# Patient Record
Sex: MALE | Race: WHITE | NOT HISPANIC OR LATINO | Employment: FULL TIME | URBAN - NONMETROPOLITAN AREA
[De-identification: names, ages, dates, MRNs, and addresses within clinical notes are randomized per-mention and may not be internally consistent; named-entity substitution may affect disease eponyms.]

---

## 2017-01-01 ENCOUNTER — HOSPITAL ENCOUNTER (OUTPATIENT)
Dept: OTHER | Facility: HOSPITAL | Age: 62
Setting detail: RADIATION/ONCOLOGY SERIES
Discharge: HOME | End: 2017-01-20
Attending: INTERNAL MEDICINE | Admitting: INTERNAL MEDICINE

## 2017-01-16 DIAGNOSIS — D50.9 IRON DEFICIENCY ANEMIA, UNSPECIFIED: Primary | ICD-10-CM

## 2017-02-03 DIAGNOSIS — I73.9 PVD (PERIPHERAL VASCULAR DISEASE) (HCC): Primary | ICD-10-CM

## 2017-02-03 RX ORDER — CLOPIDOGREL BISULFATE 75 MG/1
75 TABLET ORAL DAILY
Qty: 30 TABLET | Refills: 5 | Status: SHIPPED | OUTPATIENT
Start: 2017-02-03 | End: 2017-09-08 | Stop reason: SDUPTHER

## 2017-02-03 RX ORDER — CLOPIDOGREL BISULFATE 75 MG/1
TABLET ORAL
Qty: 30 TABLET | Refills: 0 | Status: CANCELLED | OUTPATIENT
Start: 2017-02-03

## 2017-02-20 ENCOUNTER — LAB (OUTPATIENT)
Dept: ONCOLOGY | Facility: HOSPITAL | Age: 62
End: 2017-02-20

## 2017-02-20 DIAGNOSIS — D50.9 IRON DEFICIENCY ANEMIA, UNSPECIFIED: ICD-10-CM

## 2017-02-20 LAB
BASOPHILS # BLD AUTO: 0.05 10*3/MM3 (ref 0–0.2)
BASOPHILS NFR BLD AUTO: 0.3 % (ref 0–2)
DEPRECATED RDW RBC AUTO: 54.9 FL (ref 35.1–43.9)
EOSINOPHIL # BLD AUTO: 0.66 10*3/MM3 (ref 0–0.7)
EOSINOPHIL NFR BLD AUTO: 4.2 % (ref 0–7)
ERYTHROCYTE [DISTWIDTH] IN BLOOD BY AUTOMATED COUNT: 17.9 % (ref 11.5–14.5)
FERRITIN SERPL-MCNC: 222 NG/ML (ref 17.9–464)
HCT VFR BLD AUTO: 39.2 % (ref 39–49)
HGB BLD-MCNC: 13.5 G/DL (ref 13.7–17.3)
IMM GRANULOCYTES # BLD: 0.08 10*3/MM3 (ref 0–0.02)
IMM GRANULOCYTES NFR BLD: 0.5 % (ref 0–0.5)
IRON 24H UR-MRATE: 40 MCG/DL (ref 49–181)
IRON SATN MFR SERPL: 14 % (ref 20–55)
LYMPHOCYTES # BLD AUTO: 2.3 10*3/MM3 (ref 0.6–4.2)
LYMPHOCYTES NFR BLD AUTO: 14.6 % (ref 10–50)
MCH RBC QN AUTO: 28.8 PG (ref 26.5–34)
MCHC RBC AUTO-ENTMCNC: 34.4 G/DL (ref 31.5–36.3)
MCV RBC AUTO: 83.8 FL (ref 80–98)
MONOCYTES # BLD AUTO: 0.6 10*3/MM3 (ref 0–0.9)
MONOCYTES NFR BLD AUTO: 3.8 % (ref 0–12)
NEUTROPHILS # BLD AUTO: 12.04 10*3/MM3 (ref 2–8.6)
NEUTROPHILS NFR BLD AUTO: 76.6 % (ref 37–80)
PLATELET # BLD AUTO: 299 10*3/MM3 (ref 150–450)
PMV BLD AUTO: 11 FL (ref 8–12)
RBC # BLD AUTO: 4.68 10*6/MM3 (ref 4.37–5.74)
TIBC SERPL-MCNC: 286 MCG/DL (ref 261–462)
WBC NRBC COR # BLD: 15.73 10*3/MM3 (ref 3.2–9.8)

## 2017-02-20 PROCEDURE — 83540 ASSAY OF IRON: CPT | Performed by: INTERNAL MEDICINE

## 2017-02-20 PROCEDURE — 83550 IRON BINDING TEST: CPT | Performed by: INTERNAL MEDICINE

## 2017-02-20 PROCEDURE — 85025 COMPLETE CBC W/AUTO DIFF WBC: CPT | Performed by: INTERNAL MEDICINE

## 2017-02-20 PROCEDURE — 36415 COLL VENOUS BLD VENIPUNCTURE: CPT | Performed by: INTERNAL MEDICINE

## 2017-02-20 PROCEDURE — 82728 ASSAY OF FERRITIN: CPT | Performed by: INTERNAL MEDICINE

## 2017-02-22 ENCOUNTER — HOSPITAL ENCOUNTER (OUTPATIENT)
Dept: CT IMAGING | Facility: HOSPITAL | Age: 62
Discharge: HOME | End: 2017-02-22
Admitting: THORACIC SURGERY (CARDIOTHORACIC VASCULAR SURGERY)

## 2017-02-22 DIAGNOSIS — C34.91 MALIGNANT NEOPLASM OF UNSPECIFIED PART OF RIGHT BRONCHUS OR LUNG (HCC): ICD-10-CM

## 2017-02-22 PROCEDURE — 0 IOPAMIDOL 61 % SOLUTION: Performed by: THORACIC SURGERY (CARDIOTHORACIC VASCULAR SURGERY)

## 2017-02-22 PROCEDURE — 71260 CT THORAX DX C+: CPT

## 2017-02-22 RX ADMIN — IOPAMIDOL 94 ML: 612 INJECTION, SOLUTION INTRAVENOUS at 18:53

## 2017-02-23 ENCOUNTER — OFFICE VISIT (OUTPATIENT)
Dept: CARDIAC SURGERY | Facility: CLINIC | Age: 62
End: 2017-02-23

## 2017-02-23 ENCOUNTER — OFFICE VISIT (OUTPATIENT)
Dept: ONCOLOGY | Facility: CLINIC | Age: 62
End: 2017-02-23

## 2017-02-23 VITALS
WEIGHT: 176.1 LBS | BODY MASS INDEX: 25.21 KG/M2 | HEART RATE: 83 BPM | HEIGHT: 70 IN | DIASTOLIC BLOOD PRESSURE: 59 MMHG | TEMPERATURE: 97.4 F | RESPIRATION RATE: 18 BRPM | SYSTOLIC BLOOD PRESSURE: 100 MMHG

## 2017-02-23 VITALS
WEIGHT: 176.6 LBS | HEART RATE: 91 BPM | SYSTOLIC BLOOD PRESSURE: 131 MMHG | OXYGEN SATURATION: 90 % | HEIGHT: 70 IN | DIASTOLIC BLOOD PRESSURE: 65 MMHG | BODY MASS INDEX: 25.28 KG/M2

## 2017-02-23 DIAGNOSIS — I25.10 CORONARY ARTERY DISEASE INVOLVING NATIVE CORONARY ARTERY OF NATIVE HEART WITHOUT ANGINA PECTORIS: ICD-10-CM

## 2017-02-23 DIAGNOSIS — C34.91 MALIGNANT NEOPLASM OF RIGHT LUNG, UNSPECIFIED PART OF LUNG (HCC): ICD-10-CM

## 2017-02-23 DIAGNOSIS — I10 ESSENTIAL HYPERTENSION: ICD-10-CM

## 2017-02-23 DIAGNOSIS — D50.0 IRON DEFICIENCY ANEMIA DUE TO CHRONIC BLOOD LOSS: Primary | ICD-10-CM

## 2017-02-23 DIAGNOSIS — I73.9 PVD (PERIPHERAL VASCULAR DISEASE) (HCC): ICD-10-CM

## 2017-02-23 DIAGNOSIS — E78.2 MIXED HYPERLIPIDEMIA: ICD-10-CM

## 2017-02-23 DIAGNOSIS — I65.22 STENOSIS OF LEFT CAROTID ARTERY: Primary | ICD-10-CM

## 2017-02-23 PROCEDURE — 99214 OFFICE O/P EST MOD 30 MIN: CPT | Performed by: INTERNAL MEDICINE

## 2017-02-23 PROCEDURE — 99214 OFFICE O/P EST MOD 30 MIN: CPT | Performed by: THORACIC SURGERY (CARDIOTHORACIC VASCULAR SURGERY)

## 2017-02-23 NOTE — PROGRESS NOTES
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  Patient underwent a surgery for squamous cell cancer of the right lung which was found to be stage IB in July 2016.  Patient is here for follow-up visit today.  Denies any fatigue.  Denies any fever or chills or any recent weight loss.  Denies any abnormal swollen glands anywhere in the body.    PAST MEDICAL HISTORY:    Past Medical History   Diagnosis Date   • Allergic rhinitis    • Atherosclerosis of native arteries of the extremities with ulceration      Bilateral legs   • Benign hypertension    • Carotid artery stenosis    • Coronary atherosclerosis    • Encounter for surgical aftercare following surgery of respiratory system      Right thoracotomy with Carinal Pneumonectomy, Thoracic & Mediastinal Lymphadenetomy, Fiberoptic Bronchoscopy. 7/13/16      • Essential hypertension    • Hypercholesterolemia    • Inguinal pain      small right groin seroma   • Malignant neoplasm of lung      Right upper lobe mass suspicious for lung cancer   • JUAN on CPAP    • PVD (peripheral vascular disease)    • Renal artery stenosis    • Simple chronic bronchitis    • Sleep apnea    • SOB (shortness of breath)    • Squamous cell carcinoma of lung    • Surgical follow-up care        SOCIAL HISTORY:    Social History   Substance Use Topics   • Smoking status: Former Smoker   • Smokeless tobacco: Never Used   • Alcohol use No       Surgical History :  Past Surgical History   Procedure Laterality Date   • Thoracotomy  07/13/2016     Right thoracotomy with carinal pneumonectomy.Thoracic and mediastinal lymphadenectomy.Fiberoptic bronchoscopy   • Hip surgery  01/18/2016     Right total hip arthroplasty  anterior approach.   • Coronary artery bypass graft  07/14/2011     LIMA->LAD SVG->OM2 SVG->OM3 SVG->steve branch   • Cardiac catheterization  07/12/2011     Multi-vessel coronary artery disease with critical lesion noted in the LAD coronary artery, left circumflex coronary artery and right coronary artery as described above. The LAD appeared to be chronically occluded    • Carotid endarterectomy  12/03/2013     Right carotid endarterectomy. Carotid cerebral arteriogram   • Transesophageal echocardiogram (huy)  07/12/2011     Without color flow-LV dysfunction with an EF of 40-45% with hypokinesis/akinesis of the distal septum and apex. Mild left atrial enlargement. No significant valvular abnormalities noted. No pericardial effusion noted   • Esophagoscopy / egd  12/06/2011     With tube-Normal hypopharynx, GE junction, Duodenum, symmetrical & patent pylorus. Tongue of columnar epithelium that could be a Ivy's esophagus present. Multiple biopsies performed. Chronic gastritis in antrum. Biopsy taken   • Lung removal, total  07/13/2016     Right thoracotomy with Carinal Pneumonectomy, Thoracic & Mediastinal Lymphadenetomy, Fiberoptic Bronchoscopy       ALLERGIES:    Allergies   Allergen Reactions   • Penicillins Itching     Rash,itching       REVIEW OF SYSTEMS:      CONSTITUTIONAL:  No fever, chills, or night sweats.     HEENT:  No epistaxis, mouth sores, or difficulty swallowing.    RESPIRATORY:  No new shortness of breath or cough at present.    CARDIOVASCULAR:  No chest pain or palpitations.    GASTROINTESTINAL:  No abdominal pain, nausea, vomiting, or blood in the stool.    GENITOURINARY:  No dysuria or hematuria.    MUSCULOSKELETAL:  No any new back pain or arthralgias.     NEUROLOGICAL:  No tingling or numbness. No new headache or dizziness.     LYMPHATICS:  Denies any abnormal swollen and anywhere in the body.    SKIN:  Denies any new skin rash.    PHYSICAL EXAMINATION:      VITAL SIGNS:    Visit Vitals   •  "/59   • Pulse 83   • Temp 97.4 °F (36.3 °C)   • Resp 18   • Ht 70\" (177.8 cm)   • Wt 176 lb 1.6 oz (79.9 kg)   • BMI 25.27 kg/m2       GENERAL:  Not in any distress.    HEENT:  Normocephalic, Atraumatic.No Conjunctival pallor. No icterus. Extraocular Movements Intact. No Fascial Asymmetry noted.    NECK:  No adenopathy. No JVD.    RESPIRATORY:  Fair air entry bilateral. No rhonchi or wheezing.    CARDIOVASCULAR:  S1, S2. Regular rate and rhythm. No murmur or gallop appreciated.    ABDOMEN:  Soft, obese, nontender. Bowel sounds present in all four quadrants.  No organomegaly appreciated.    EXTREMITIES:  No edema.No Calf Tenderness.    NEUROLOGIC:  Alert, awake and oriented ×3.  No  Motor or sensory deficit appreciated. Cranial Nerves 2-12 grossly intact.        DIAGNOSTIC DATA:    GLUCOSE   Date Value Ref Range Status   12/08/2016 89 60 - 100 mg/dl Final     SODIUM   Date Value Ref Range Status   12/08/2016 139 137 - 145 mmol/L Final     POTASSIUM   Date Value Ref Range Status   12/08/2016 4.0 3.5 - 5.1 mmol/L Final     CO2   Date Value Ref Range Status   12/08/2016 28 22 - 31 mmol/L Final     CHLORIDE   Date Value Ref Range Status   12/08/2016 99 95 - 110 mmol/L Final     ANION GAP   Date Value Ref Range Status   12/08/2016 12.0 5.0 - 15.0 mmol/L Final     CREATININE   Date Value Ref Range Status   12/08/2016 1.0 0.7 - 1.3 mg/dl Final     BUN   Date Value Ref Range Status   12/08/2016 10 7 - 21 mg/dl Final     CALCIUM   Date Value Ref Range Status   12/08/2016 8.9 8.4 - 10.2 mg/dl Final     ALKALINE PHOSPHATASE   Date Value Ref Range Status   12/08/2016 98 38 - 126 U/L Final     TOTAL PROTEIN   Date Value Ref Range Status   12/08/2016 7.0 6.3 - 8.6 gm/dl Final     ALT (SGPT)   Date Value Ref Range Status   12/08/2016 34 21 - 72 U/L Final     AST (SGOT)   Date Value Ref Range Status   12/08/2016 17 17 - 59 U/L Final     TOTAL BILIRUBIN   Date Value Ref Range Status   12/08/2016 0.3 0.2 - 1.3 mg/dl Final "     ALBUMIN   Date Value Ref Range Status   12/08/2016 3.6 3.4 - 4.8 gm/dl Final     Lab Results   Component Value Date    WBC 15.73 (H) 02/20/2017    HGB 13.5 (L) 02/20/2017    HCT 39.2 02/20/2017    MCV 83.8 02/20/2017     02/20/2017     Lab Results   Component Value Date    NEUTROABS 12.04 (H) 02/20/2017    IRON 40 (L) 02/20/2017    TIBC 286 02/20/2017    LABIRON 14 (L) 02/20/2017    FERRITIN 222.00 02/20/2017    HWUDCLAN82 359 12/08/2016    FOLATE 11.60 12/08/2016         RADIOLOGY DATA :  CT chest with contrast done on February 22, 2017 shows:  IMPRESSION:  CONCLUSION:   1. Interval enlargement of a single right paratracheal lymph node  that has macroscopic fat and is somewhat favored to be benign but  the increase in size raises question of developing metastatic  disease. Consider follow-up PET/CT or at least short-term  follow-up CT.  2. Interval right pneumonectomy with otherwise no evidence to  suggest recurrent or metastatic disease in the chest      ASSESSMENT AND PLAN:      1.  Anemia: Anemia workup in the past as shown some multifactorial with iron deficiency, borderline vitamin B12 and folate level.  Patient is status post intravenous Feraheme last dose was on December 29, 2016.  His hemoglobin upon this visit has improved to 13.5 .  At this point we'll recommend continue taking iron sulfate along with folic acid and vitamin B12 by mouth.  We will see him back in about 3 months with a repeat CBC and CMP done on that visit along with iron studies.    2.  Stage IB squamous cell cancer of right lung, status post resection in July 2016.  At that point patient was offered adjuvant chemotherapy by Dr. Randolph but he refused.  Recently done CT of chest on February 22, 2017 did show right paratracheal lymph node increasing in size from 1.5-2 cm.  Patient was earlier seen by Dr. Ray today and as per patient is not recommending any more intervention at this point.  He is another CT scan scheduled in  about 6 months from now.  Recommend following up with Dr. Ray closely.    3.  Coronary artery disease    4.  History of GI bleed due to AVMs in the duodenum.    5.  Health maintenance: Patient does not smoke.  Had a colonoscopy done in 2016.  He remains full code.    6.  Prescriptions: Patient is enough prescription for vitamin B12 and folic acid for now.      Fly Bustamante MD  2/23/2017  3:58 PM

## 2017-02-27 RX ORDER — AMLODIPINE BESYLATE 10 MG/1
TABLET ORAL
Qty: 30 TABLET | Refills: 5 | Status: SHIPPED | OUTPATIENT
Start: 2017-02-27 | End: 2017-08-29 | Stop reason: SDUPTHER

## 2017-03-03 PROBLEM — I65.22 STENOSIS OF LEFT CAROTID ARTERY: Status: ACTIVE | Noted: 2017-03-03

## 2017-03-03 RX ORDER — SODIUM CHLORIDE 0.9 % (FLUSH) 0.9 %
1-10 SYRINGE (ML) INJECTION AS NEEDED
Status: CANCELLED | OUTPATIENT
Start: 2017-03-14

## 2017-03-03 RX ORDER — SODIUM CHLORIDE 9 MG/ML
100 INJECTION, SOLUTION INTRAVENOUS CONTINUOUS
Status: CANCELLED | OUTPATIENT
Start: 2017-03-14

## 2017-03-04 NOTE — PROGRESS NOTES
2/23/2017    Juanito Brayden Pruitt  1955      Chief Complaint:    Chief Complaint   Patient presents with   • Lung Nodule     ct results       HPI:      Peripheral Vascular Disease:    PCP:  Dr Dalton  Cardiology:  Dr Thompson  GI:  Dr Moya  Hematology:  Dr Randolph    60yr man with PVD, Carotid Stenosis, CAD, HTN, dyslipidemia, GERD.   RIGHT groin pain resolved.  progressive anemia, workup in progress.  Carotid stenosis requires treatment.  No hemoptysis, cough, wt loss.  Claudication None, No rest pain, No ischemic tissue loss  No amaurosis fugax, No TIA, No stroke  No other associated signs, symptoms or modifying factors.    12/2011:  bilateral renal artery stents  4/20/2012 JUAN:  RIGHT 1.1 triphasic, LEFT 1.1 triphasic  6/7/2012 Exercise JUAN:  RIGHT .89 to .69, LEFT 1.0 to .87  4/25/2014 US RIGHT groin:  [id number] fluid collection deep to femoral vessels.  5/13/2014 US drainage seroma:  10ml serous fluid, no residual seroma.  5/20/2014 US RIGHT groin:  5x2x0.5cm fluid collection deep to femoral vessels.  7/21/2014 Renal Artery Duplex:  RIGHT maxPSV 222cm/s,RI .87, RAR 2.8.  LEFT maxPSV 271cm/s, RI .88, RAR 3.4.  patent bilateral renal artery stents.    9/12/2012 Carotid Duplex:  ARIC 70-75% antegrade.  LICA <50% antegrade (maxPSV 96cm/s).  9/17/2012 CTA Carotids:  ARIC 60% small ulcerative plaque.  LICA mild irregularity.  10/11/2013 CTA Carotids:  ARIC 85%, LICA mild irregularity  12/3/13 RIGHT CEA   1/13/2014 Carotid Duplex:  ARIC 0-15%  7/21/2014 Carotid Duplex:  ARIC 0-15% antegrade.  LICA 50-79% antegrade (maxPSV 145cm/s).  1/26/2015 Carotid Duplex:  ARIC 0-15% antegrade.  LICA 50-79% antegrade (maxPSV 147cm/s).  2/1/2016  Carotid Duplex:  ARIC 0-15% antegrade.  LICA 50-79% antegrade ([id number] cm/s, ratio 2.5) .   2/25/2016 CTA Carotids:  ARIC no significant disease, LICA 75-80%.    8/2011 CXR:  LEFT lower lobe atelectasis.  6/16/2016 CXR:  RIGHT paratracheal mass, medial segment  atelectasis.  2016 PET CT:  RIGHT upper lobe post obstructive atelectasis.  mass obstructing upper lobe bronchus SUV 10.  no significant adenopathy.  liver, adrenals ok.  2016 PFT:  FVC 4.2 (95%), FEV1 1.7 (47%), DLCO 48%  2016 AB.42/41/57/26/90%  2016 Quantitative lung perfusion scan:  RIGHT 31%, LEFT 69% (predicted post pneumonectomy FEV1 1100ml, adequate for resection)  16  Right thoracotomy with Carinal Pneumonectomy, Thoracic & Mediastinal Lymphadenetomy, Fiberoptic Bronchoscopy.    7/15/16  Hospital DC   PATH:  FINAL  RIGHT LUNG:  SQUAMOUS CARCINOMA, MODERATELY DIFFERENTIATED OF       UPPER LOBE BRONCHUS (4.5 CM IN GREATEST DIMENSION).  THE NEOPLASM IS 0.5 CM AWAY FROM THE BRONCHIAL MARGIN       AND FOUR PERIBRONCHIAL LYMPH NODES SHOW NO       EVIDENCE OF MALIGNANCY.  SUBPLEURAL ABSCESS DISTAL TO THE TUMOR.  NO EVIDENCE OF ANGIOLYMPHATIC INVOLVEMENT.  B.    LEVEL 10 LYMPH NODES (2):  NO SIGNIFICANT PATHOLOGIC DIAGNOSIS.  C.    LEVEL 7 LYMPH NODES (3):  NO SIGNIFICANT PATHOLOGIC DIAGNOSIS.  16  Chest xray:  expected postoperative changes following right pneumonectomy. Left lung appears clear. There is a displaced right lateral sixth rib fracture it appears to be acute.   16  Chest xray:  Old healed left rib fractures noted. Displaced right sixth rib fracture unchanged in appearance. CONCLUSION- No significant change in near complete opacification of the right hemithorax. Left lung remains clear.   11/15/16  Chest xray:  consistent with postpneumonectomy changes. There is again noted a displaced right lateral sixth rib fracture. The patient is status post median sternotomy. Emphysema is noted in the left lung. The left lung is otherwise clear. CONCLUSION- No acute disease.   2017 CT Chest:  No new nodules.  Possible RIGHT paratracheal lymph node vs postop change.  Liver adrenal ok.    The following portions of the patient's history were reviewed and updated as  appropriate: allergies, current medications, past family history, past medical history, past social history, past surgical history and problem list.  Recent images independently reviewed.  Available laboratory values reviewed.    PMH:  Past Medical History   Diagnosis Date   • Allergic rhinitis    • Atherosclerosis of native arteries of the extremities with ulceration      Bilateral legs   • Benign hypertension    • Carotid artery stenosis    • Coronary atherosclerosis    • Encounter for surgical aftercare following surgery of respiratory system      Right thoracotomy with Carinal Pneumonectomy, Thoracic & Mediastinal Lymphadenetomy, Fiberoptic Bronchoscopy. 7/13/16      • Essential hypertension    • Hypercholesterolemia    • Inguinal pain      small right groin seroma   • Malignant neoplasm of lung      Right upper lobe mass suspicious for lung cancer   • JUAN on CPAP    • PVD (peripheral vascular disease)    • Renal artery stenosis    • Simple chronic bronchitis    • Sleep apnea    • SOB (shortness of breath)    • Squamous cell carcinoma of lung    • Surgical follow-up care        ALLERGIES:  Allergies   Allergen Reactions   • Penicillins Itching     Rash,itching         MEDICATIONS:    Current Outpatient Prescriptions:   •  albuterol (PROVENTIL HFA;VENTOLIN HFA) 108 (90 BASE) MCG/ACT inhaler, Inhale 2 puffs every 4 (four) hours as needed for wheezing., Disp: , Rfl:   •  aspirin 81 MG EC tablet, Take 81 mg by mouth daily., Disp: , Rfl:   •  clopidogrel (PLAVIX) 75 MG tablet, Take 1 tablet by mouth Daily., Disp: 30 tablet, Rfl: 5  •  famotidine (PEPCID) 40 MG tablet, Take 40 mg by mouth., Disp: , Rfl:   •  ferrous sulfate 325 (65 FE) MG tablet, Take 325 mg by mouth daily with breakfast., Disp: , Rfl:   •  folic acid (FOLVITE) 1 MG tablet, Take 1 mg by mouth Daily., Disp: , Rfl:   •  HYDROcodone-acetaminophen (NORCO) 7.5-325 MG per tablet, Take 1 tablet by mouth Every 4 (Four) Hours As Needed for moderate pain  (4-6)., Disp: 50 tablet, Rfl: 0  •  labetalol (NORMODYNE) 200 MG tablet, TAKE ONE TABLET BY MOUTH TWICE A DAY, Disp: 60 tablet, Rfl: 5  •  lidocaine (LIDODERM) 5 %, Place 1 patch on the skin Daily. Remove & Discard patch within 12 hours or as directed by MD, Disp: 30 patch, Rfl: 0  •  rosuvastatin (CRESTOR) 10 MG tablet, Take 10 mg by mouth., Disp: , Rfl:   •  vitamin B-12 (CYANOCOBALAMIN) 100 MCG tablet, Take 50 mcg by mouth Daily., Disp: , Rfl:   •  amLODIPine (NORVASC) 10 MG tablet, TAKE ONE TABLET BY MOUTH DAILY, Disp: 30 tablet, Rfl: 5    Review of Systems   Review of Systems   Constitution: Negative for weakness, malaise/fatigue and weight loss.   Cardiovascular: Negative for chest pain, claudication and dyspnea on exertion.   Respiratory: Negative for cough and shortness of breath.    Skin: Negative for color change and poor wound healing.   Musculoskeletal: Positive for back pain.   Neurological: Negative for dizziness and numbness.       Physical Exam   Physical Exam   Constitutional: He is oriented to person, place, and time. He is active and cooperative. He does not appear ill. No distress.   HENT:   Right Ear: Hearing normal.   Left Ear: Hearing normal.   Nose: No nasal deformity. No epistaxis.   Mouth/Throat: He does not have dentures. Normal dentition.   Neck: Carotid bruit is present.   Cardiovascular: Normal rate and regular rhythm.    No murmur heard.  Pulses:       Carotid pulses are 2+ on the right side, and 2+ on the left side.       Radial pulses are 2+ on the right side, and 2+ on the left side.        Dorsalis pedis pulses are 2+ on the right side, and 2+ on the left side.        Posterior tibial pulses are 2+ on the right side, and 2+ on the left side.   Pulmonary/Chest: Effort normal and breath sounds normal.   Mild to moderate tenderness RIGHT lateral chest wall   Abdominal: Soft. He exhibits no distension and no mass. There is no tenderness.   Musculoskeletal: He exhibits no deformity.    Gait normal.    Neurological: He is alert and oriented to person, place, and time. He has normal strength.   Skin: Skin is warm and dry. No cyanosis or erythema. No pallor.   No venous staining  Incisions well healed.   Psychiatric: He has a normal mood and affect. His speech is normal. Judgment and thought content normal.   Results for JORGE PRUITT (MRN [id number]) as of 3/3/2017 22:27   Ref. Range 12/8/2016 14:00   Creatinine Latest Ref Range: 0.7 - 1.3 mg/dl 1.0   BUN Latest Ref Range: 7 - 21 mg/dl 10       ASSESSMENT:  Jorge was seen today for lung nodule.    Diagnoses and all orders for this visit:    Stenosis of left carotid artery  -     Case request  -     sodium chloride 0.9 % flush 1-10 mL; Infuse 1-10 mL into a venous catheter As Needed for line care.  -     sodium chloride 0.9 % infusion; Infuse 100 mL/hr into a venous catheter Continuous.  -     ceFAZolin (ANCEF) 2 g in sodium chloride 0.9 % 100 mL IVPB; Infuse 2 g into a venous catheter 1 (One) Time.    Coronary artery disease involving native coronary artery of native heart without angina pectoris    PVD (peripheral vascular disease)    Essential hypertension    Mixed hyperlipidemia    Malignant neoplasm of right lung, unspecified part of lung  -     CT Chest Without Contrast; Future    Other orders  -     Follow Anesthesia Guidelines / Standing Orders; Future  -     Follow Anesthesia Guidelines / Standing Orders; Standing  -     Obtain informed consent; Standing  -     Chlorhexidine 4%/Hibiclens Skin Prep; Standing  -     Insert Arterial Line; Standing  -     Place sequential compression device; Standing  -     Insert Peripheral IV; Standing  -     Saline Lock & Maintain IV Access; Standing    PLAN:  Detailed discussion with Mr Pruitt regarding situation, options and plans.  severe, asymptomatic carotid stenosis.  Carotid intervention is advisable. Carotid  Endarterectomy is advisable.    Risks, including but not limited to:  Mortality, major  morbidity 1%.  Stroke risk 2-3%.  bleeding, transfusion, infection, pulmonary, renal dysfunction, blood vessel and nerve injury.  Benefits:  reduction of stroke risk  Options: carotid endarterectomy, stenting and medical therapy discussed.   usually overnight stay in hospital if all well.Understands and wishes to proceed.    LEFT Carotid endarterectomy with patch, completion arteriogram, radial arterial line, , Ancef.  GEN.  SDS.  3/14/2017     Return in 6 months with CT Chest without contrast.  Recommended regular physical activity, progressive walking program.  Continue current medications as directed.  Will Obtain relevant old records.    Thank you for the opportunity to participate in this patient's care.    Copy to primary care provider.    EMR Dragon/Transcription disclaimer:   Much of this encounter note is an electronic transcription/translation of spoken language to printed text. The electronic translation of spoken language may permit erroneous, or at times, nonsensical words or phrases to be inadvertently transcribed; Although I have reviewed the note for such errors, some may still exist.

## 2017-03-05 NOTE — PROGRESS NOTES
I have reviewed the notes, assessments, and/or procedures performed by Olivier Gotti RN , I concur with her documentation of Juanito Pruitt.

## 2017-03-08 ENCOUNTER — APPOINTMENT (OUTPATIENT)
Dept: PREADMISSION TESTING | Facility: HOSPITAL | Age: 62
End: 2017-03-08

## 2017-03-08 VITALS
BODY MASS INDEX: 25.05 KG/M2 | WEIGHT: 175 LBS | HEIGHT: 70 IN | DIASTOLIC BLOOD PRESSURE: 52 MMHG | SYSTOLIC BLOOD PRESSURE: 110 MMHG | OXYGEN SATURATION: 95 % | HEART RATE: 79 BPM

## 2017-03-08 LAB
ABO GROUP BLD: NORMAL
ANION GAP SERPL CALCULATED.3IONS-SCNC: 12 MMOL/L (ref 5–15)
BLD GP AB SCN SERPL QL: NEGATIVE
BUN BLD-MCNC: 13 MG/DL (ref 7–21)
BUN/CREAT SERPL: 12.9 (ref 7–25)
CALCIUM SPEC-SCNC: 9.6 MG/DL (ref 8.4–10.2)
CHLORIDE SERPL-SCNC: 99 MMOL/L (ref 95–110)
CO2 SERPL-SCNC: 27 MMOL/L (ref 22–31)
CREAT BLD-MCNC: 1.01 MG/DL (ref 0.7–1.3)
DEPRECATED RDW RBC AUTO: 54.6 FL (ref 35.1–43.9)
ERYTHROCYTE [DISTWIDTH] IN BLOOD BY AUTOMATED COUNT: 17.3 % (ref 11.5–14.5)
GFR SERPL CREATININE-BSD FRML MDRD: 75 ML/MIN/1.73 (ref 49–113)
GLUCOSE BLD-MCNC: 89 MG/DL (ref 60–100)
HCT VFR BLD AUTO: 41.2 % (ref 39–49)
HGB BLD-MCNC: 13.9 G/DL (ref 13.7–17.3)
Lab: NORMAL
MCH RBC QN AUTO: 28.8 PG (ref 26.5–34)
MCHC RBC AUTO-ENTMCNC: 33.7 G/DL (ref 31.5–36.3)
MCV RBC AUTO: 85.5 FL (ref 80–98)
PLATELET # BLD AUTO: 264 10*3/MM3 (ref 150–450)
PMV BLD AUTO: 11.8 FL (ref 8–12)
POTASSIUM BLD-SCNC: 4.3 MMOL/L (ref 3.5–5.1)
RBC # BLD AUTO: 4.82 10*6/MM3 (ref 4.37–5.74)
RH BLD: POSITIVE
SODIUM BLD-SCNC: 138 MMOL/L (ref 137–145)
WBC NRBC COR # BLD: 10.96 10*3/MM3 (ref 3.2–9.8)

## 2017-03-08 PROCEDURE — 86850 RBC ANTIBODY SCREEN: CPT | Performed by: ANESTHESIOLOGY

## 2017-03-08 PROCEDURE — 86901 BLOOD TYPING SEROLOGIC RH(D): CPT | Performed by: ANESTHESIOLOGY

## 2017-03-08 PROCEDURE — 85027 COMPLETE CBC AUTOMATED: CPT | Performed by: ANESTHESIOLOGY

## 2017-03-08 PROCEDURE — 86900 BLOOD TYPING SEROLOGIC ABO: CPT | Performed by: ANESTHESIOLOGY

## 2017-03-08 PROCEDURE — 36415 COLL VENOUS BLD VENIPUNCTURE: CPT

## 2017-03-08 PROCEDURE — 80048 BASIC METABOLIC PNL TOTAL CA: CPT | Performed by: ANESTHESIOLOGY

## 2017-03-08 RX ORDER — FUROSEMIDE 20 MG/1
20 TABLET ORAL DAILY
COMMUNITY
End: 2017-04-27

## 2017-03-08 RX ORDER — CYCLOBENZAPRINE HCL 10 MG
10 TABLET ORAL AS NEEDED
Status: ON HOLD | COMMUNITY
End: 2017-03-14

## 2017-03-08 NOTE — DISCHARGE INSTRUCTIONS
[facility]    Preparing the Skin Before Surgery  Preparing or “prepping” skin before surgery can reduce the risk of infection at the surgical site. To make the process easier, this facility has chosen disposable cloths moistened with a rinse-free, 2% Chlorhexidine Gluconate (CHG) antiseptic solution. The steps below outline the prepping process and should be carefully followed.      Prep the skin at the following time(s):                       Prep the circled area(s) only:        *Skin should be prepped at home on   the night prior to surgery.         *If you wish to bathe/shower, do so   at least one hour before you prep   your skin with the cloths.  *Do not shave hair in surgical area for at   least 2 days prior to applying prep  _______________________________  _______________________________       Directions for Prepping Skin:  ? When applying CHG, your skin should be completely dry and cool.  ? Open package and remove cloths. Avoid touching cloths to any surface other than specified area to reduce the risk of contamination.  ? Prep the area(s) circled above by wiping the area in a back-and-forth motion.    Avoid contact with eyes, ears, mouth, and mucous membranes. When applied to sensitive skin, CHG may cause skin irritation such as a temporary itching sensation  and/or redness.         If itching or redness persists, rinse affected areas and discontinue use.  ? Use all cloths in the package(s).   ? Allow area to air dry for one minute. DO NOT RINSE. It is normal for the skin to have a temporary “tacky” feel for several minutes after the antiseptic solution is applied.   ? Discard cloths in trash can.  ? Place the Prep Check™ sticker(s) from the package on the bottom of this sheet as indicated.  ? Once the skin prep has been completed, do not bathe/shower, apply make-up, powder, or lotions to skin.                  [facility]  Pre-op Information and Guidelines    You  will be called after 2 p.m. the day before your surgery (Friday for Monday surgery) and notified of your time for arrival and approximate surgery time.  If you have not received a call by 4P.M., please contact Same Day Surgery at [phone] of if outside [facility] call [phone].    Please Follow these Important Safety Guidelines:    • The morning of your procedure, take only the medications listed below with   A sip of water:_____________________________________________       ______________________________________________    • DO NOT eat or drink anything after 12:00 midnight the night before surgery  Specific instructions concerning drinking clear liquids will be discussed during  the pre-surgery instruction call the day before your surgery.    • If you take a blood thinner (ex. Plavix, Coumadin, aspirin), ask your doctor when to stop it before surgery  STOP DATE: _________________    • Only 2 visitors are allowed in patient rooms at a time  Your visitors will be asked to wait in the lobby until the admission process is complete with the exception of a parent with a child and patients in need of special assistance.    • YOU CANNOT DRIVE YOURSELF HOME  You must be accompanied by someone who will be responsible for driving you home after surgery and for your care at home.    • DO NOT chew gum, use breath mints, hard candy, or smoke the day of surgery  • DO NOT drink alcohol for at least 24 hours before your surgery  • DO NOT wear any jewelry and remove all body piercing before coming to the hospital  • DO NOT wear make-up to the hospital  • If you are having surgery on an extremity (arm/leg/foot) remove nail polish/artificial nails on the surgical side  • Clothing, glasses, contacts, dentures, and hairpieces must be removed before surgery  Bathe the night before or the morning of your surgery and do not use powders/lotions on skin.

## 2017-03-13 ENCOUNTER — ANESTHESIA EVENT (OUTPATIENT)
Dept: PERIOP | Facility: HOSPITAL | Age: 62
End: 2017-03-13

## 2017-03-14 ENCOUNTER — HOSPITAL ENCOUNTER (INPATIENT)
Facility: HOSPITAL | Age: 62
LOS: 1 days | Discharge: HOME | End: 2017-03-15
Attending: THORACIC SURGERY (CARDIOTHORACIC VASCULAR SURGERY) | Admitting: THORACIC SURGERY (CARDIOTHORACIC VASCULAR SURGERY)

## 2017-03-14 ENCOUNTER — ANESTHESIA (OUTPATIENT)
Dept: PERIOP | Facility: HOSPITAL | Age: 62
End: 2017-03-14

## 2017-03-14 ENCOUNTER — APPOINTMENT (OUTPATIENT)
Dept: GENERAL RADIOLOGY | Facility: HOSPITAL | Age: 62
End: 2017-03-14

## 2017-03-14 DIAGNOSIS — I65.22 STENOSIS OF LEFT CAROTID ARTERY: ICD-10-CM

## 2017-03-14 PROCEDURE — 25010000002 ONDANSETRON PER 1 MG: Performed by: NURSE ANESTHETIST, CERTIFIED REGISTERED

## 2017-03-14 PROCEDURE — 25010000002 MIDAZOLAM PER 1 MG: Performed by: NURSE ANESTHETIST, CERTIFIED REGISTERED

## 2017-03-14 PROCEDURE — 25010000002 HEPARIN (PORCINE) PER 1000 UNITS: Performed by: NURSE ANESTHETIST, CERTIFIED REGISTERED

## 2017-03-14 PROCEDURE — 25010000003 CEFAZOLIN PER 500 MG: Performed by: THORACIC SURGERY (CARDIOTHORACIC VASCULAR SURGERY)

## 2017-03-14 PROCEDURE — 25010000002 KETOROLAC TROMETHAMINE PER 15 MG: Performed by: THORACIC SURGERY (CARDIOTHORACIC VASCULAR SURGERY)

## 2017-03-14 PROCEDURE — 94640 AIRWAY INHALATION TREATMENT: CPT

## 2017-03-14 PROCEDURE — 35301 RECHANNELING OF ARTERY: CPT | Performed by: THORACIC SURGERY (CARDIOTHORACIC VASCULAR SURGERY)

## 2017-03-14 PROCEDURE — 03CN0ZZ EXTIRPATION OF MATTER FROM LEFT EXTERNAL CAROTID ARTERY, OPEN APPROACH: ICD-10-PCS | Performed by: THORACIC SURGERY (CARDIOTHORACIC VASCULAR SURGERY)

## 2017-03-14 PROCEDURE — 88305 TISSUE EXAM BY PATHOLOGIST: CPT | Performed by: THORACIC SURGERY (CARDIOTHORACIC VASCULAR SURGERY)

## 2017-03-14 PROCEDURE — 03CL0ZZ EXTIRPATION OF MATTER FROM LEFT INTERNAL CAROTID ARTERY, OPEN APPROACH: ICD-10-PCS | Performed by: THORACIC SURGERY (CARDIOTHORACIC VASCULAR SURGERY)

## 2017-03-14 PROCEDURE — 25010000002 PHENYLEPHRINE PER 1 ML: Performed by: NURSE ANESTHETIST, CERTIFIED REGISTERED

## 2017-03-14 PROCEDURE — 76000 FLUOROSCOPY <1 HR PHYS/QHP: CPT

## 2017-03-14 PROCEDURE — 25010000002 HYDROMORPHONE PER 4 MG: Performed by: NURSE ANESTHETIST, CERTIFIED REGISTERED

## 2017-03-14 PROCEDURE — 0 IOPAMIDOL 61 % SOLUTION: Performed by: THORACIC SURGERY (CARDIOTHORACIC VASCULAR SURGERY)

## 2017-03-14 PROCEDURE — 88305 TISSUE EXAM BY PATHOLOGIST: CPT | Performed by: PATHOLOGY

## 2017-03-14 PROCEDURE — C1768 GRAFT, VASCULAR: HCPCS | Performed by: THORACIC SURGERY (CARDIOTHORACIC VASCULAR SURGERY)

## 2017-03-14 PROCEDURE — 88311 DECALCIFY TISSUE: CPT | Performed by: PATHOLOGY

## 2017-03-14 PROCEDURE — 25010000002 FENTANYL CITRATE (PF) 100 MCG/2ML SOLUTION: Performed by: NURSE ANESTHETIST, CERTIFIED REGISTERED

## 2017-03-14 PROCEDURE — 25010000002 PROPOFOL 10 MG/ML EMULSION: Performed by: NURSE ANESTHETIST, CERTIFIED REGISTERED

## 2017-03-14 PROCEDURE — 25010000002 HEPARIN (PORCINE) PER 1000 UNITS: Performed by: THORACIC SURGERY (CARDIOTHORACIC VASCULAR SURGERY)

## 2017-03-14 PROCEDURE — 88304 TISSUE EXAM BY PATHOLOGIST: CPT | Performed by: THORACIC SURGERY (CARDIOTHORACIC VASCULAR SURGERY)

## 2017-03-14 PROCEDURE — 88311 DECALCIFY TISSUE: CPT | Performed by: THORACIC SURGERY (CARDIOTHORACIC VASCULAR SURGERY)

## 2017-03-14 PROCEDURE — 25010000002 PROTAMINE SULFATE PER 10 MG: Performed by: NURSE ANESTHETIST, CERTIFIED REGISTERED

## 2017-03-14 PROCEDURE — 94799 UNLISTED PULMONARY SVC/PX: CPT

## 2017-03-14 PROCEDURE — 88304 TISSUE EXAM BY PATHOLOGIST: CPT | Performed by: PATHOLOGY

## 2017-03-14 DEVICE — PTCH VASC XENOSURE BIOLOGIC 1X6CM: Type: IMPLANTABLE DEVICE | Site: CAROTID | Status: FUNCTIONAL

## 2017-03-14 RX ORDER — ONDANSETRON 4 MG/1
4 TABLET, ORALLY DISINTEGRATING ORAL EVERY 6 HOURS PRN
Status: DISCONTINUED | OUTPATIENT
Start: 2017-03-14 | End: 2017-03-15 | Stop reason: HOSPADM

## 2017-03-14 RX ORDER — HEPARIN SODIUM 5000 [USP'U]/ML
INJECTION, SOLUTION INTRAVENOUS; SUBCUTANEOUS AS NEEDED
Status: DISCONTINUED | OUTPATIENT
Start: 2017-03-14 | End: 2017-03-15 | Stop reason: HOSPADM

## 2017-03-14 RX ORDER — SODIUM CHLORIDE 0.9 % (FLUSH) 0.9 %
1-10 SYRINGE (ML) INJECTION AS NEEDED
Status: DISCONTINUED | OUTPATIENT
Start: 2017-03-14 | End: 2017-03-14 | Stop reason: HOSPADM

## 2017-03-14 RX ORDER — ACETAMINOPHEN 325 MG/1
650 TABLET ORAL EVERY 4 HOURS PRN
Status: DISCONTINUED | OUTPATIENT
Start: 2017-03-14 | End: 2017-03-15 | Stop reason: HOSPADM

## 2017-03-14 RX ORDER — ROSUVASTATIN CALCIUM 10 MG/1
10 TABLET, COATED ORAL NIGHTLY
Status: DISCONTINUED | OUTPATIENT
Start: 2017-03-14 | End: 2017-03-14 | Stop reason: CLARIF

## 2017-03-14 RX ORDER — CLOPIDOGREL BISULFATE 75 MG/1
75 TABLET ORAL NIGHTLY
Status: DISCONTINUED | OUTPATIENT
Start: 2017-03-15 | End: 2017-03-15 | Stop reason: HOSPADM

## 2017-03-14 RX ORDER — LABETALOL 200 MG/1
200 TABLET, FILM COATED ORAL 2 TIMES DAILY
Status: ON HOLD | COMMUNITY
End: 2017-03-14 | Stop reason: SDUPTHER

## 2017-03-14 RX ORDER — PRENATAL VIT 91/IRON/FOLIC/DHA 28-975-200
1 COMBINATION PACKAGE (EA) ORAL 2 TIMES DAILY
Status: ON HOLD | COMMUNITY
Start: 2017-02-01 | End: 2017-03-14

## 2017-03-14 RX ORDER — ASPIRIN 81 MG/1
81 TABLET ORAL DAILY
Status: DISCONTINUED | OUTPATIENT
Start: 2017-03-15 | End: 2017-03-15 | Stop reason: HOSPADM

## 2017-03-14 RX ORDER — FAMOTIDINE 10 MG/ML
20 INJECTION, SOLUTION INTRAVENOUS 2 TIMES DAILY
Status: DISCONTINUED | OUTPATIENT
Start: 2017-03-14 | End: 2017-03-15 | Stop reason: HOSPADM

## 2017-03-14 RX ORDER — FENTANYL CITRATE 50 UG/ML
INJECTION, SOLUTION INTRAMUSCULAR; INTRAVENOUS AS NEEDED
Status: DISCONTINUED | OUTPATIENT
Start: 2017-03-14 | End: 2017-03-14 | Stop reason: SURG

## 2017-03-14 RX ORDER — LIDOCAINE HYDROCHLORIDE 20 MG/ML
INJECTION, SOLUTION INFILTRATION; PERINEURAL AS NEEDED
Status: DISCONTINUED | OUTPATIENT
Start: 2017-03-14 | End: 2017-03-14 | Stop reason: SURG

## 2017-03-14 RX ORDER — PROMETHAZINE HYDROCHLORIDE 25 MG/ML
6.25 INJECTION, SOLUTION INTRAMUSCULAR; INTRAVENOUS ONCE AS NEEDED
Status: DISCONTINUED | OUTPATIENT
Start: 2017-03-14 | End: 2017-03-14 | Stop reason: HOSPADM

## 2017-03-14 RX ORDER — PROMETHAZINE HYDROCHLORIDE 25 MG/1
25 TABLET ORAL ONCE AS NEEDED
Status: DISCONTINUED | OUTPATIENT
Start: 2017-03-14 | End: 2017-03-14 | Stop reason: HOSPADM

## 2017-03-14 RX ORDER — ONDANSETRON 2 MG/ML
4 INJECTION INTRAMUSCULAR; INTRAVENOUS ONCE AS NEEDED
Status: DISCONTINUED | OUTPATIENT
Start: 2017-03-14 | End: 2017-03-14 | Stop reason: HOSPADM

## 2017-03-14 RX ORDER — ATORVASTATIN CALCIUM 20 MG/1
20 TABLET, FILM COATED ORAL NIGHTLY
Status: DISCONTINUED | OUTPATIENT
Start: 2017-03-14 | End: 2017-03-15 | Stop reason: HOSPADM

## 2017-03-14 RX ORDER — FAMOTIDINE 20 MG/1
20 TABLET, FILM COATED ORAL 2 TIMES DAILY
Status: DISCONTINUED | OUTPATIENT
Start: 2017-03-14 | End: 2017-03-15 | Stop reason: HOSPADM

## 2017-03-14 RX ORDER — NITROGLYCERIN 20 MG/100ML
10-50 INJECTION INTRAVENOUS
Status: DISCONTINUED | OUTPATIENT
Start: 2017-03-14 | End: 2017-03-14

## 2017-03-14 RX ORDER — ONDANSETRON 2 MG/ML
INJECTION INTRAMUSCULAR; INTRAVENOUS AS NEEDED
Status: DISCONTINUED | OUTPATIENT
Start: 2017-03-14 | End: 2017-03-14 | Stop reason: SURG

## 2017-03-14 RX ORDER — NITROGLYCERIN 20 MG/100ML
5-200 INJECTION INTRAVENOUS
Status: DISCONTINUED | OUTPATIENT
Start: 2017-03-14 | End: 2017-03-15 | Stop reason: HOSPADM

## 2017-03-14 RX ORDER — FAMOTIDINE 40 MG/1
40 TABLET, FILM COATED ORAL 2 TIMES DAILY
Status: DISCONTINUED | OUTPATIENT
Start: 2017-03-14 | End: 2017-03-14 | Stop reason: SDUPTHER

## 2017-03-14 RX ORDER — MIDAZOLAM HYDROCHLORIDE 1 MG/ML
INJECTION INTRAMUSCULAR; INTRAVENOUS AS NEEDED
Status: DISCONTINUED | OUTPATIENT
Start: 2017-03-14 | End: 2017-03-14 | Stop reason: SURG

## 2017-03-14 RX ORDER — ALBUTEROL SULFATE 2.5 MG/3ML
2.5 SOLUTION RESPIRATORY (INHALATION) EVERY 4 HOURS PRN
Status: DISCONTINUED | OUTPATIENT
Start: 2017-03-14 | End: 2017-03-15 | Stop reason: HOSPADM

## 2017-03-14 RX ORDER — AMLODIPINE BESYLATE 10 MG/1
10 TABLET ORAL
Status: DISCONTINUED | OUTPATIENT
Start: 2017-03-15 | End: 2017-03-15 | Stop reason: SDUPTHER

## 2017-03-14 RX ORDER — HEPARIN SODIUM 1000 [USP'U]/ML
INJECTION, SOLUTION INTRAVENOUS; SUBCUTANEOUS AS NEEDED
Status: DISCONTINUED | OUTPATIENT
Start: 2017-03-14 | End: 2017-03-14 | Stop reason: SURG

## 2017-03-14 RX ORDER — SENNA AND DOCUSATE SODIUM 50; 8.6 MG/1; MG/1
2 TABLET, FILM COATED ORAL 2 TIMES DAILY PRN
Status: DISCONTINUED | OUTPATIENT
Start: 2017-03-14 | End: 2017-03-15 | Stop reason: HOSPADM

## 2017-03-14 RX ORDER — ONDANSETRON 4 MG/1
4 TABLET, FILM COATED ORAL EVERY 6 HOURS PRN
Status: DISCONTINUED | OUTPATIENT
Start: 2017-03-14 | End: 2017-03-15 | Stop reason: HOSPADM

## 2017-03-14 RX ORDER — ALBUTEROL SULFATE 90 UG/1
2 AEROSOL, METERED RESPIRATORY (INHALATION) EVERY 4 HOURS PRN
Status: DISCONTINUED | OUTPATIENT
Start: 2017-03-14 | End: 2017-03-14 | Stop reason: CLARIF

## 2017-03-14 RX ORDER — KETOROLAC TROMETHAMINE 30 MG/ML
30 INJECTION, SOLUTION INTRAMUSCULAR; INTRAVENOUS ONCE
Status: COMPLETED | OUTPATIENT
Start: 2017-03-14 | End: 2017-03-14

## 2017-03-14 RX ORDER — ALBUTEROL SULFATE 2.5 MG/3ML
2.5 SOLUTION RESPIRATORY (INHALATION)
Status: DISCONTINUED | OUTPATIENT
Start: 2017-03-14 | End: 2017-03-15 | Stop reason: HOSPADM

## 2017-03-14 RX ORDER — HYDROCODONE BITARTRATE AND ACETAMINOPHEN 5; 325 MG/1; MG/1
1 TABLET ORAL EVERY 4 HOURS PRN
Status: DISCONTINUED | OUTPATIENT
Start: 2017-03-14 | End: 2017-03-15 | Stop reason: HOSPADM

## 2017-03-14 RX ORDER — SODIUM CHLORIDE 9 MG/ML
100 INJECTION, SOLUTION INTRAVENOUS CONTINUOUS
Status: DISCONTINUED | OUTPATIENT
Start: 2017-03-14 | End: 2017-03-14

## 2017-03-14 RX ORDER — BISACODYL 10 MG
10 SUPPOSITORY, RECTAL RECTAL DAILY PRN
Status: DISCONTINUED | OUTPATIENT
Start: 2017-03-14 | End: 2017-03-15 | Stop reason: HOSPADM

## 2017-03-14 RX ORDER — ROCURONIUM BROMIDE 10 MG/ML
INJECTION, SOLUTION INTRAVENOUS AS NEEDED
Status: DISCONTINUED | OUTPATIENT
Start: 2017-03-14 | End: 2017-03-14 | Stop reason: SURG

## 2017-03-14 RX ORDER — FAMOTIDINE 40 MG/1
40 TABLET, FILM COATED ORAL DAILY
Status: ON HOLD | COMMUNITY
End: 2017-03-14 | Stop reason: CLARIF

## 2017-03-14 RX ORDER — DEXTROSE AND SODIUM CHLORIDE 5; .45 G/100ML; G/100ML
75 INJECTION, SOLUTION INTRAVENOUS CONTINUOUS
Status: DISCONTINUED | OUTPATIENT
Start: 2017-03-14 | End: 2017-03-15 | Stop reason: HOSPADM

## 2017-03-14 RX ORDER — PROTAMINE SULFATE 10 MG/ML
INJECTION, SOLUTION INTRAVENOUS AS NEEDED
Status: DISCONTINUED | OUTPATIENT
Start: 2017-03-14 | End: 2017-03-14 | Stop reason: SURG

## 2017-03-14 RX ORDER — PROPOFOL 10 MG/ML
VIAL (ML) INTRAVENOUS AS NEEDED
Status: DISCONTINUED | OUTPATIENT
Start: 2017-03-14 | End: 2017-03-14 | Stop reason: SURG

## 2017-03-14 RX ORDER — ONDANSETRON 2 MG/ML
4 INJECTION INTRAMUSCULAR; INTRAVENOUS EVERY 6 HOURS PRN
Status: DISCONTINUED | OUTPATIENT
Start: 2017-03-14 | End: 2017-03-15 | Stop reason: HOSPADM

## 2017-03-14 RX ORDER — PROMETHAZINE HYDROCHLORIDE 25 MG/1
25 SUPPOSITORY RECTAL ONCE AS NEEDED
Status: DISCONTINUED | OUTPATIENT
Start: 2017-03-14 | End: 2017-03-14 | Stop reason: HOSPADM

## 2017-03-14 RX ORDER — LABETALOL 200 MG/1
200 TABLET, FILM COATED ORAL EVERY 12 HOURS SCHEDULED
Status: DISCONTINUED | OUTPATIENT
Start: 2017-03-14 | End: 2017-03-15 | Stop reason: HOSPADM

## 2017-03-14 RX ORDER — LIDOCAINE 50 MG/G
1 PATCH TOPICAL EVERY 24 HOURS
Status: DISCONTINUED | OUTPATIENT
Start: 2017-03-14 | End: 2017-03-15 | Stop reason: HOSPADM

## 2017-03-14 RX ADMIN — MIDAZOLAM 2 MG: 1 INJECTION INTRAMUSCULAR; INTRAVENOUS at 08:37

## 2017-03-14 RX ADMIN — ALBUTEROL SULFATE 2.5 MG: 2.5 SOLUTION RESPIRATORY (INHALATION) at 21:15

## 2017-03-14 RX ADMIN — PHENYLEPHRINE HYDROCHLORIDE 0.5 MCG/KG/MIN: 10 INJECTION INTRAVENOUS at 08:58

## 2017-03-14 RX ADMIN — LABETALOL HYDROCHLORIDE 200 MG: 200 TABLET, FILM COATED ORAL at 20:29

## 2017-03-14 RX ADMIN — CEFAZOLIN SODIUM 2 G: 1 INJECTION, POWDER, FOR SOLUTION INTRAMUSCULAR; INTRAVENOUS at 23:34

## 2017-03-14 RX ADMIN — PROTAMINE SULFATE 20 MG: 10 INJECTION, SOLUTION INTRAVENOUS at 11:23

## 2017-03-14 RX ADMIN — FAMOTIDINE 20 MG: 20 TABLET ORAL at 18:18

## 2017-03-14 RX ADMIN — FENTANYL CITRATE 25 MCG: 50 INJECTION, SOLUTION INTRAMUSCULAR; INTRAVENOUS at 11:42

## 2017-03-14 RX ADMIN — FENTANYL CITRATE 50 MCG: 50 INJECTION, SOLUTION INTRAMUSCULAR; INTRAVENOUS at 08:47

## 2017-03-14 RX ADMIN — PROPOFOL 120 MG: 10 INJECTION, EMULSION INTRAVENOUS at 08:47

## 2017-03-14 RX ADMIN — HYDROMORPHONE HYDROCHLORIDE 0.25 MG: 1 INJECTION, SOLUTION INTRAMUSCULAR; INTRAVENOUS; SUBCUTANEOUS at 12:41

## 2017-03-14 RX ADMIN — ROCURONIUM BROMIDE 50 MG: 10 INJECTION INTRAVENOUS at 08:47

## 2017-03-14 RX ADMIN — SODIUM CHLORIDE: 900 INJECTION, SOLUTION INTRAVENOUS at 08:34

## 2017-03-14 RX ADMIN — CEFAZOLIN SODIUM 2 G: 1 INJECTION, POWDER, FOR SOLUTION INTRAMUSCULAR; INTRAVENOUS at 09:00

## 2017-03-14 RX ADMIN — LIDOCAINE HYDROCHLORIDE 60 MG: 20 INJECTION, SOLUTION INFILTRATION; PERINEURAL at 08:47

## 2017-03-14 RX ADMIN — ONDANSETRON 4 MG: 2 INJECTION INTRAMUSCULAR; INTRAVENOUS at 11:02

## 2017-03-14 RX ADMIN — HYDROMORPHONE HYDROCHLORIDE 0.25 MG: 1 INJECTION, SOLUTION INTRAMUSCULAR; INTRAVENOUS; SUBCUTANEOUS at 12:50

## 2017-03-14 RX ADMIN — KETOROLAC TROMETHAMINE 30 MG: 30 INJECTION, SOLUTION INTRAMUSCULAR; INTRAVENOUS at 12:40

## 2017-03-14 RX ADMIN — PROTAMINE SULFATE 20 MG: 10 INJECTION, SOLUTION INTRAVENOUS at 11:00

## 2017-03-14 RX ADMIN — CEFAZOLIN SODIUM 2 G: 1 INJECTION, POWDER, FOR SOLUTION INTRAMUSCULAR; INTRAVENOUS at 17:30

## 2017-03-14 RX ADMIN — HEPARIN SODIUM 8000 UNITS: 1000 INJECTION, SOLUTION INTRAVENOUS; SUBCUTANEOUS at 09:42

## 2017-03-14 RX ADMIN — DEXTROSE AND SODIUM CHLORIDE 75 ML/HR: 5; 450 INJECTION, SOLUTION INTRAVENOUS at 14:59

## 2017-03-14 RX ADMIN — ATORVASTATIN CALCIUM 20 MG: 20 TABLET, FILM COATED ORAL at 20:29

## 2017-03-14 NOTE — PLAN OF CARE
Problem: Patient Care Overview (Adult)  Goal: Plan of Care Review    03/14/17 1829   Coping/Psychosocial Response Interventions   Plan Of Care Reviewed With patient   Patient Care Overview   Progress improving   Outcome Evaluation   Outcome Summary/Follow up Plan received from PACU today; A&O; dressing dry et intact; no concerns at this time       Goal: Adult Individualization and Mutuality  Outcome: Ongoing (interventions implemented as appropriate)  Goal: Discharge Needs Assessment  Outcome: Ongoing (interventions implemented as appropriate)    Problem: Perioperative Period (Adult)  Goal: Signs and Symptoms of Listed Potential Problems Will be Absent or Manageable (Perioperative Period)  Outcome: Outcome(s) achieved Date Met:  03/14/17    Problem: Fall Risk (Adult)  Goal: Identify Related Risk Factors and Signs and Symptoms  Outcome: Ongoing (interventions implemented as appropriate)  Goal: Absence of Falls  Outcome: Ongoing (interventions implemented as appropriate)    Problem: Pain, Acute (Adult)  Goal: Identify Related Risk Factors and Signs and Symptoms  Outcome: Ongoing (interventions implemented as appropriate)  Goal: Acceptable Pain Control/Comfort Level  Outcome: Ongoing (interventions implemented as appropriate)    Problem: Carotid Endarterectomy (Adult)  Goal: Signs and Symptoms of Listed Potential Problems Will be Absent or Manageable (Carotid Endarterectomy)  Outcome: Ongoing (interventions implemented as appropriate)

## 2017-03-14 NOTE — OP NOTE
OPERATIVE NOTE  Juanito Pruitt  1955  3/14/2017    PREOP DIAGNOSES:  Stenosis of left carotid artery [I65.22]  Coronary artery disease  Lung cancer (pneumonectomy)    POSTOP DIAGNOSES:  Stenosis of left carotid artery [I65.22]    PROCEDURE:   LEFT CAROTID ENDARTERECTOMY  CAROTID CEREBRAL ARTERIOGRAM     SURGEON: LORIN Ray MD FACS RPVI    ASSISTANT: Polly Hyman CFA    ANESTHESIA: General ET      ESTIMATED BLOOD LOSS: 50 ml     COMPLICATIONS: none    DESCRIPTION OF OPERATION:   Patient taken to the operating room placed in supine position. general endotracheal anesthesia was induced. patient prepped draped sterile fashion. oblique incision made in the left neck dissection carried down to the common carotid artery and its branches which were isolated. total of 8,000 units of intravenous heparin given to maintain an ACT around 300. distal internal carotid artery was clamped Christopher clamp proximal common carotid artery with water baby clamp, external isolated with vessel loop. superior thyroid artery not present in its normal position.  Common carotid artery was opened with 11 blade and Ortega scissors across the plaque into the normal distal internal carotid artery.  diffuse plaque in the proximal portion of the internal carotid artery.  There is 90% focal calcified plaque in the proximal internal carotid artery with mild debris,  Irrigated clear with heparinized saline.  Shunt was placed distally with good backbleeding and proximally with good flow secured with vessel loops. carotid endarterectomy was performed with [place] elevator, eversion endarterectomy of the external carotid artery. proximal plaque was cut with Ortega scissors, distal plaque tapered nicely. loose debris was removed, irrigated with heparinized saline. pericardial patch was sewn in place with 6-0 Prolene.  Near completion of the suture line the shunt was removed and vessels reclamped. suture line was completed, usually  bubbling techniques were employed, internal was flashed proximal and external clamps were removed, after 3 cardiac cycles the internal clamp was removed.  A 23-gauge butterfly needle was inserted in the proximal patch. carotid cerebral arteriogram was performed demonstrating patent internal and external carotid arteries brisk flow into the intracranial vessels, no evidence of obstruction, extravasation or dissection. needle was removed and puncture site was repaired with 7-0 Prolene.  Single 6-0 Prolene repair stitch and the suture line.  20 mg of protamine was given with return of ACT to baseline. hemostasis was obtained. 15 [place] Cj drain was placed in the wound bed and exiting the base of the neck. incision closed in layers of 3-0 Vicryl in the platysma, 4-0 Monocryl in the skin with Dermabond tape.  Awoke from anesthesia, moving all extremities, no focal deficits. patient tolerated procedure well and transferred to PACU in stable condition.          This document has been electronically signed by Brayden Ray MD on March 14, 2017 11:46 AM

## 2017-03-14 NOTE — ANESTHESIA PREPROCEDURE EVALUATION
Anesthesia Evaluation     Patient summary reviewed and Nursing notes reviewed   NPO Status: > 8 hours   Airway   Mallampati: II  TM distance: >3 FB  Neck ROM: full  possible difficult intubation  Dental    (+) poor dentation    Pulmonary - normal exam    breath sounds clear to auscultation  (+) a smoker Former, COPD mild, shortness of breath, sleep apnea on CPAP,   Cardiovascular - normal exam    ECG reviewed  Rhythm: regular  Rate: normal    (+) hypertension well controlled, CAD, PVD (Left carotid plaque),     ROS comment: EKG:NSR with RBBB    Neuro/Psych- negative ROS  GI/Hepatic/Renal/Endo    (+)  chronic renal disease (Creatinine 1.01),     Musculoskeletal (-) negative ROS    Abdominal    Substance History - negative use     OB/GYN negative ob/gyn ROS         Other - negative ROS                                   Anesthesia Plan    ASA 3     general   (Discussed arterial line and patient understands possible complications,risks and agrees.)  intravenous induction   Anesthetic plan and risks discussed with patient and spouse/significant other.    Plan discussed with CRNA.

## 2017-03-14 NOTE — ANESTHESIA PROCEDURE NOTES
Airway  Urgency: elective    Airway not difficult    General Information and Staff    Patient location during procedure: OR    Indications and Patient Condition  Indications for airway management: airway protection    Preoxygenated: yes  MILS maintained throughout  Mask difficulty assessment: 1 - vent by mask    Final Airway Details  Final airway type: endotracheal airway      Successful airway: ETT  Cuffed: yes   Successful intubation technique: direct laryngoscopy  Facilitating devices/methods: intubating stylet  Endotracheal tube insertion site: oral  Blade: Mehrdad  Blade size: #3  ETT size: 7.5 mm  Cormack-Lehane Classification: grade IIa - partial view of glottis  Placement verified by: chest auscultation and capnometry   Cuff volume (mL): 7  Measured from: lips  ETT to lips (cm): 22  Number of attempts at approach: 1

## 2017-03-14 NOTE — PLAN OF CARE
Problem: Patient Care Overview (Adult)  Goal: Plan of Care Review  Outcome: Ongoing (interventions implemented as appropriate)    03/14/17 1331   Coping/Psychosocial Response Interventions   Plan Of Care Reviewed With patient   Patient Care Overview   Progress improving   Outcome Evaluation   Outcome Summary/Follow up Plan PACU DC criteria met. Pt. drowsy, sleeping, easily arousable to verbal stimuli. Speech clear. Pt. verbalizing appropriately with staff.  equal, moderate in strength. Pedal pushes strong and equal bilaterally. Pupils equal, round and reactive to light. No facial assymetry noted. No neuro defcits noted in PACU. Vital signs stable.

## 2017-03-14 NOTE — H&P (VIEW-ONLY)
2/23/2017    Juanito Brayden Pruitt  1955      Chief Complaint:    Chief Complaint   Patient presents with   • Lung Nodule     ct results       HPI:      Peripheral Vascular Disease:    PCP:  Dr Dalton  Cardiology:  Dr Thompson  GI:  Dr Moya  Hematology:  Dr Randolph    60yr man with PVD, Carotid Stenosis, CAD, HTN, dyslipidemia, GERD.   RIGHT groin pain resolved.  progressive anemia, workup in progress.  Carotid stenosis requires treatment.  No hemoptysis, cough, wt loss.  Claudication None, No rest pain, No ischemic tissue loss  No amaurosis fugax, No TIA, No stroke  No other associated signs, symptoms or modifying factors.    12/2011:  bilateral renal artery stents  4/20/2012 JUAN:  RIGHT 1.1 triphasic, LEFT 1.1 triphasic  6/7/2012 Exercise JUAN:  RIGHT .89 to .69, LEFT 1.0 to .87  4/25/2014 US RIGHT groin:  [id number] fluid collection deep to femoral vessels.  5/13/2014 US drainage seroma:  10ml serous fluid, no residual seroma.  5/20/2014 US RIGHT groin:  5x2x0.5cm fluid collection deep to femoral vessels.  7/21/2014 Renal Artery Duplex:  RIGHT maxPSV 222cm/s,RI .87, RAR 2.8.  LEFT maxPSV 271cm/s, RI .88, RAR 3.4.  patent bilateral renal artery stents.    9/12/2012 Carotid Duplex:  ARIC 70-75% antegrade.  LICA <50% antegrade (maxPSV 96cm/s).  9/17/2012 CTA Carotids:  ARIC 60% small ulcerative plaque.  LICA mild irregularity.  10/11/2013 CTA Carotids:  ARIC 85%, LICA mild irregularity  12/3/13 RIGHT CEA   1/13/2014 Carotid Duplex:  ARIC 0-15%  7/21/2014 Carotid Duplex:  ARIC 0-15% antegrade.  LICA 50-79% antegrade (maxPSV 145cm/s).  1/26/2015 Carotid Duplex:  ARIC 0-15% antegrade.  LICA 50-79% antegrade (maxPSV 147cm/s).  2/1/2016  Carotid Duplex:  ARIC 0-15% antegrade.  LICA 50-79% antegrade ([id number] cm/s, ratio 2.5) .   2/25/2016 CTA Carotids:  ARIC no significant disease, LICA 75-80%.    8/2011 CXR:  LEFT lower lobe atelectasis.  6/16/2016 CXR:  RIGHT paratracheal mass, medial segment  atelectasis.  2016 PET CT:  RIGHT upper lobe post obstructive atelectasis.  mass obstructing upper lobe bronchus SUV 10.  no significant adenopathy.  liver, adrenals ok.  2016 PFT:  FVC 4.2 (95%), FEV1 1.7 (47%), DLCO 48%  2016 AB.42/41/57/26/90%  2016 Quantitative lung perfusion scan:  RIGHT 31%, LEFT 69% (predicted post pneumonectomy FEV1 1100ml, adequate for resection)  16  Right thoracotomy with Carinal Pneumonectomy, Thoracic & Mediastinal Lymphadenetomy, Fiberoptic Bronchoscopy.    7/15/16  Hospital DC   PATH:  FINAL  RIGHT LUNG:  SQUAMOUS CARCINOMA, MODERATELY DIFFERENTIATED OF       UPPER LOBE BRONCHUS (4.5 CM IN GREATEST DIMENSION).  THE NEOPLASM IS 0.5 CM AWAY FROM THE BRONCHIAL MARGIN       AND FOUR PERIBRONCHIAL LYMPH NODES SHOW NO       EVIDENCE OF MALIGNANCY.  SUBPLEURAL ABSCESS DISTAL TO THE TUMOR.  NO EVIDENCE OF ANGIOLYMPHATIC INVOLVEMENT.  B.    LEVEL 10 LYMPH NODES (2):  NO SIGNIFICANT PATHOLOGIC DIAGNOSIS.  C.    LEVEL 7 LYMPH NODES (3):  NO SIGNIFICANT PATHOLOGIC DIAGNOSIS.  16  Chest xray:  expected postoperative changes following right pneumonectomy. Left lung appears clear. There is a displaced right lateral sixth rib fracture it appears to be acute.   16  Chest xray:  Old healed left rib fractures noted. Displaced right sixth rib fracture unchanged in appearance. CONCLUSION- No significant change in near complete opacification of the right hemithorax. Left lung remains clear.   11/15/16  Chest xray:  consistent with postpneumonectomy changes. There is again noted a displaced right lateral sixth rib fracture. The patient is status post median sternotomy. Emphysema is noted in the left lung. The left lung is otherwise clear. CONCLUSION- No acute disease.   2017 CT Chest:  No new nodules.  Possible RIGHT paratracheal lymph node vs postop change.  Liver adrenal ok.    The following portions of the patient's history were reviewed and updated as  appropriate: allergies, current medications, past family history, past medical history, past social history, past surgical history and problem list.  Recent images independently reviewed.  Available laboratory values reviewed.    PMH:  Past Medical History   Diagnosis Date   • Allergic rhinitis    • Atherosclerosis of native arteries of the extremities with ulceration      Bilateral legs   • Benign hypertension    • Carotid artery stenosis    • Coronary atherosclerosis    • Encounter for surgical aftercare following surgery of respiratory system      Right thoracotomy with Carinal Pneumonectomy, Thoracic & Mediastinal Lymphadenetomy, Fiberoptic Bronchoscopy. 7/13/16      • Essential hypertension    • Hypercholesterolemia    • Inguinal pain      small right groin seroma   • Malignant neoplasm of lung      Right upper lobe mass suspicious for lung cancer   • JUAN on CPAP    • PVD (peripheral vascular disease)    • Renal artery stenosis    • Simple chronic bronchitis    • Sleep apnea    • SOB (shortness of breath)    • Squamous cell carcinoma of lung    • Surgical follow-up care        ALLERGIES:  Allergies   Allergen Reactions   • Penicillins Itching     Rash,itching         MEDICATIONS:    Current Outpatient Prescriptions:   •  albuterol (PROVENTIL HFA;VENTOLIN HFA) 108 (90 BASE) MCG/ACT inhaler, Inhale 2 puffs every 4 (four) hours as needed for wheezing., Disp: , Rfl:   •  aspirin 81 MG EC tablet, Take 81 mg by mouth daily., Disp: , Rfl:   •  clopidogrel (PLAVIX) 75 MG tablet, Take 1 tablet by mouth Daily., Disp: 30 tablet, Rfl: 5  •  famotidine (PEPCID) 40 MG tablet, Take 40 mg by mouth., Disp: , Rfl:   •  ferrous sulfate 325 (65 FE) MG tablet, Take 325 mg by mouth daily with breakfast., Disp: , Rfl:   •  folic acid (FOLVITE) 1 MG tablet, Take 1 mg by mouth Daily., Disp: , Rfl:   •  HYDROcodone-acetaminophen (NORCO) 7.5-325 MG per tablet, Take 1 tablet by mouth Every 4 (Four) Hours As Needed for moderate pain  (4-6)., Disp: 50 tablet, Rfl: 0  •  labetalol (NORMODYNE) 200 MG tablet, TAKE ONE TABLET BY MOUTH TWICE A DAY, Disp: 60 tablet, Rfl: 5  •  lidocaine (LIDODERM) 5 %, Place 1 patch on the skin Daily. Remove & Discard patch within 12 hours or as directed by MD, Disp: 30 patch, Rfl: 0  •  rosuvastatin (CRESTOR) 10 MG tablet, Take 10 mg by mouth., Disp: , Rfl:   •  vitamin B-12 (CYANOCOBALAMIN) 100 MCG tablet, Take 50 mcg by mouth Daily., Disp: , Rfl:   •  amLODIPine (NORVASC) 10 MG tablet, TAKE ONE TABLET BY MOUTH DAILY, Disp: 30 tablet, Rfl: 5    Review of Systems   Review of Systems   Constitution: Negative for weakness, malaise/fatigue and weight loss.   Cardiovascular: Negative for chest pain, claudication and dyspnea on exertion.   Respiratory: Negative for cough and shortness of breath.    Skin: Negative for color change and poor wound healing.   Musculoskeletal: Positive for back pain.   Neurological: Negative for dizziness and numbness.       Physical Exam   Physical Exam   Constitutional: He is oriented to person, place, and time. He is active and cooperative. He does not appear ill. No distress.   HENT:   Right Ear: Hearing normal.   Left Ear: Hearing normal.   Nose: No nasal deformity. No epistaxis.   Mouth/Throat: He does not have dentures. Normal dentition.   Neck: Carotid bruit is present.   Cardiovascular: Normal rate and regular rhythm.    No murmur heard.  Pulses:       Carotid pulses are 2+ on the right side, and 2+ on the left side.       Radial pulses are 2+ on the right side, and 2+ on the left side.        Dorsalis pedis pulses are 2+ on the right side, and 2+ on the left side.        Posterior tibial pulses are 2+ on the right side, and 2+ on the left side.   Pulmonary/Chest: Effort normal and breath sounds normal.   Mild to moderate tenderness RIGHT lateral chest wall   Abdominal: Soft. He exhibits no distension and no mass. There is no tenderness.   Musculoskeletal: He exhibits no deformity.    Gait normal.    Neurological: He is alert and oriented to person, place, and time. He has normal strength.   Skin: Skin is warm and dry. No cyanosis or erythema. No pallor.   No venous staining  Incisions well healed.   Psychiatric: He has a normal mood and affect. His speech is normal. Judgment and thought content normal.   Results for JORGE PRUITT (MRN [id number]) as of 3/3/2017 22:27   Ref. Range 12/8/2016 14:00   Creatinine Latest Ref Range: 0.7 - 1.3 mg/dl 1.0   BUN Latest Ref Range: 7 - 21 mg/dl 10       ASSESSMENT:  Jorge was seen today for lung nodule.    Diagnoses and all orders for this visit:    Stenosis of left carotid artery  -     Case request  -     sodium chloride 0.9 % flush 1-10 mL; Infuse 1-10 mL into a venous catheter As Needed for line care.  -     sodium chloride 0.9 % infusion; Infuse 100 mL/hr into a venous catheter Continuous.  -     ceFAZolin (ANCEF) 2 g in sodium chloride 0.9 % 100 mL IVPB; Infuse 2 g into a venous catheter 1 (One) Time.    Coronary artery disease involving native coronary artery of native heart without angina pectoris    PVD (peripheral vascular disease)    Essential hypertension    Mixed hyperlipidemia    Malignant neoplasm of right lung, unspecified part of lung  -     CT Chest Without Contrast; Future    Other orders  -     Follow Anesthesia Guidelines / Standing Orders; Future  -     Follow Anesthesia Guidelines / Standing Orders; Standing  -     Obtain informed consent; Standing  -     Chlorhexidine 4%/Hibiclens Skin Prep; Standing  -     Insert Arterial Line; Standing  -     Place sequential compression device; Standing  -     Insert Peripheral IV; Standing  -     Saline Lock & Maintain IV Access; Standing    PLAN:  Detailed discussion with Mr Pruitt regarding situation, options and plans.  severe, asymptomatic carotid stenosis.  Carotid intervention is advisable. Carotid  Endarterectomy is advisable.    Risks, including but not limited to:  Mortality, major  morbidity 1%.  Stroke risk 2-3%.  bleeding, transfusion, infection, pulmonary, renal dysfunction, blood vessel and nerve injury.  Benefits:  reduction of stroke risk  Options: carotid endarterectomy, stenting and medical therapy discussed.   usually overnight stay in hospital if all well.Understands and wishes to proceed.    LEFT Carotid endarterectomy with patch, completion arteriogram, radial arterial line, , Ancef.  GEN.  SDS.  3/14/2017     Return in 6 months with CT Chest without contrast.  Recommended regular physical activity, progressive walking program.  Continue current medications as directed.  Will Obtain relevant old records.    Thank you for the opportunity to participate in this patient's care.    Copy to primary care provider.    EMR Dragon/Transcription disclaimer:   Much of this encounter note is an electronic transcription/translation of spoken language to printed text. The electronic translation of spoken language may permit erroneous, or at times, nonsensical words or phrases to be inadvertently transcribed; Although I have reviewed the note for such errors, some may still exist.

## 2017-03-14 NOTE — ANESTHESIA POSTPROCEDURE EVALUATION
Patient: Juanito Pruitt    Procedure Summary     Date Anesthesia Start Anesthesia Stop Room / Location    03/14/17 0839 1204 [facility] OR 05 / [facility] OR       Procedure Diagnosis Surgeon Provider    CAROTID ENDARTERECTOMY, ARTERIOGRAM  (Left Neck) Stenosis of left carotid artery  (Stenosis of left carotid artery [I65.22]) MD Lele Corley MD          Anesthesia Type: general  Last vitals  BP      Temp      Pulse     Resp      SpO2        Post Anesthesia Care and Evaluation    Patient location during evaluation: PACU  Patient participation: complete - patient participated  Level of consciousness: sleepy but conscious  Pain management: adequate  Airway patency: patent  Anesthetic complications: No anesthetic complications  PONV Status: none  Cardiovascular status: acceptable  Respiratory status: acceptable  Hydration status: acceptable    Comments: Pt following commands.  Pt unable to squeeze right hand but has gross movement of the right arm.  Dr. Ray aware

## 2017-03-15 ENCOUNTER — TRANSCRIBE ORDERS (OUTPATIENT)
Dept: CARDIAC SURGERY | Facility: CLINIC | Age: 62
End: 2017-03-15

## 2017-03-15 VITALS
SYSTOLIC BLOOD PRESSURE: 114 MMHG | BODY MASS INDEX: 25.63 KG/M2 | TEMPERATURE: 97.6 F | WEIGHT: 179.01 LBS | OXYGEN SATURATION: 93 % | HEIGHT: 70 IN | HEART RATE: 71 BPM | DIASTOLIC BLOOD PRESSURE: 56 MMHG | RESPIRATION RATE: 18 BRPM

## 2017-03-15 PROBLEM — D50.0 IRON DEFICIENCY ANEMIA DUE TO CHRONIC BLOOD LOSS: Status: RESOLVED | Noted: 2017-02-23 | Resolved: 2017-03-15

## 2017-03-15 LAB
LAB AP CASE REPORT: NORMAL
Lab: NORMAL
PATH REPORT.FINAL DX SPEC: NORMAL
PATH REPORT.GROSS SPEC: NORMAL

## 2017-03-15 PROCEDURE — 94799 UNLISTED PULMONARY SVC/PX: CPT

## 2017-03-15 PROCEDURE — 99024 POSTOP FOLLOW-UP VISIT: CPT | Performed by: NURSE PRACTITIONER

## 2017-03-15 PROCEDURE — 94760 N-INVAS EAR/PLS OXIMETRY 1: CPT

## 2017-03-15 RX ORDER — ACETAMINOPHEN 325 MG/1
650 TABLET ORAL EVERY 4 HOURS PRN
Qty: 40 TABLET | Refills: 0 | Status: SHIPPED | OUTPATIENT
Start: 2017-03-15 | End: 2017-05-04

## 2017-03-15 RX ORDER — HYDROCODONE BITARTRATE AND ACETAMINOPHEN 5; 325 MG/1; MG/1
1 TABLET ORAL EVERY 4 HOURS PRN
Qty: 40 TABLET | Refills: 0 | Status: SHIPPED | OUTPATIENT
Start: 2017-03-15 | End: 2017-03-24

## 2017-03-15 RX ORDER — SENNA AND DOCUSATE SODIUM 50; 8.6 MG/1; MG/1
1 TABLET, FILM COATED ORAL 2 TIMES DAILY
Qty: 40 TABLET | Refills: 0 | Status: SHIPPED | OUTPATIENT
Start: 2017-03-15 | End: 2017-05-04

## 2017-03-15 RX ORDER — AMLODIPINE BESYLATE 10 MG/1
10 TABLET ORAL
Status: DISCONTINUED | OUTPATIENT
Start: 2017-03-15 | End: 2017-03-15 | Stop reason: HOSPADM

## 2017-03-15 RX ADMIN — ALBUTEROL SULFATE 2.5 MG: 2.5 SOLUTION RESPIRATORY (INHALATION) at 08:20

## 2017-03-15 RX ADMIN — DEXTROSE AND SODIUM CHLORIDE 75 ML/HR: 5; 450 INJECTION, SOLUTION INTRAVENOUS at 04:42

## 2017-03-15 RX ADMIN — AMLODIPINE BESYLATE 10 MG: 10 TABLET ORAL at 09:37

## 2017-03-15 RX ADMIN — FAMOTIDINE 20 MG: 20 TABLET ORAL at 08:38

## 2017-03-15 RX ADMIN — LABETALOL HYDROCHLORIDE 200 MG: 200 TABLET, FILM COATED ORAL at 08:24

## 2017-03-15 RX ADMIN — HYDROCODONE BITARTRATE AND ACETAMINOPHEN 1 TABLET: 5; 325 TABLET ORAL at 01:34

## 2017-03-15 RX ADMIN — HYDROCODONE BITARTRATE AND ACETAMINOPHEN 1 TABLET: 5; 325 TABLET ORAL at 08:51

## 2017-03-15 RX ADMIN — ASPIRIN 81 MG: 81 TABLET ORAL at 08:24

## 2017-03-15 NOTE — PLAN OF CARE
Problem: Patient Care Overview (Adult)  Goal: Plan of Care Review  Outcome: Ongoing (interventions implemented as appropriate)    03/15/17 0454   Coping/Psychosocial Response Interventions   Plan Of Care Reviewed With patient;spouse   Patient Care Overview   Progress improving   Outcome Evaluation   Outcome Summary/Follow up Plan VS stable, no c/o chest pain. post sx pain well controlled.          Problem: Fall Risk (Adult)  Goal: Identify Related Risk Factors and Signs and Symptoms  Outcome: Ongoing (interventions implemented as appropriate)  Goal: Absence of Falls  Outcome: Ongoing (interventions implemented as appropriate)    Problem: Pain, Acute (Adult)  Goal: Identify Related Risk Factors and Signs and Symptoms  Outcome: Ongoing (interventions implemented as appropriate)  Goal: Acceptable Pain Control/Comfort Level  Outcome: Ongoing (interventions implemented as appropriate)    Problem: Carotid Endarterectomy (Adult)  Goal: Signs and Symptoms of Listed Potential Problems Will be Absent or Manageable (Carotid Endarterectomy)  Outcome: Ongoing (interventions implemented as appropriate)

## 2017-03-15 NOTE — PLAN OF CARE
Problem: Patient Care Overview (Adult)  Goal: Adult Individualization and Mutuality  Outcome: Outcome(s) achieved Date Met:  03/15/17    03/14/17 1829   Individualization   Patient Specific Preferences none   Patient Specific Interventions none   Mutuality/Individual Preferences   What Anxieties, Fears or Concerns Do You Have About Your Health or Care? none   What Questions Do You Have About Your Health or Care? none   What Information Would Help Us Give You More Personalized Care? none       Goal: Discharge Needs Assessment  Outcome: Outcome(s) achieved Date Met:  03/15/17    03/14/17 1643 03/14/17 1656 03/14/17 1829   Discharge Needs Assessment   Concerns To Be Addressed --  --  no discharge needs identified   Readmission Within The Last 30 Days --  --  no previous admission in last 30 days   Equipment Needed After Discharge --  --  none   Discharge Disposition --  --  --    Current Health   Anticipated Changes Related to Illness --  --  none   Living Environment   Transportation Available --  car --    Self-Care   Equipment Currently Used at Home none --  --      03/15/17 0953   Discharge Needs Assessment   Concerns To Be Addressed --    Readmission Within The Last 30 Days --    Equipment Needed After Discharge --    Discharge Disposition home or self-care   Current Health   Anticipated Changes Related to Illness --    Living Environment   Transportation Available --    Self-Care   Equipment Currently Used at Home --          Problem: Fall Risk (Adult)  Goal: Identify Related Risk Factors and Signs and Symptoms  Outcome: Outcome(s) achieved Date Met:  03/15/17  Goal: Absence of Falls  Outcome: Outcome(s) achieved Date Met:  03/15/17    Problem: Pain, Acute (Adult)  Goal: Identify Related Risk Factors and Signs and Symptoms  Outcome: Outcome(s) achieved Date Met:  03/15/17  Goal: Acceptable Pain Control/Comfort Level  Outcome: Outcome(s) achieved Date Met:  03/15/17    Problem: Carotid Endarterectomy  (Adult)  Goal: Signs and Symptoms of Listed Potential Problems Will be Absent or Manageable (Carotid Endarterectomy)  Outcome: Outcome(s) achieved Date Met:  03/15/17

## 2017-03-15 NOTE — PROGRESS NOTES
CTVS DAILY NOTE        LOS: 1 day   POD# 1  Carotid Endarterectomy  PREOP DIAGNOSES:  Stenosis of left carotid artery [I65.22]  Coronary artery disease  Lung cancer (pneumonectomy)     POSTOP DIAGNOSES:  Stenosis of left carotid artery [I65.22]     PROCEDURE:   LEFT CAROTID ENDARTERECTOMY  CAROTID CEREBRAL ARTERIOGRAM    Chief Complaint: VAS 7 with dressing change   Denies any neurosensory symptoms       Subjective       VAS 7 well controlled with norco     ROS:    Review of Systems   Constitution: Negative for chills, decreased appetite, fever and malaise/fatigue.   HENT: Negative for headaches, hearing loss, hoarse voice, nosebleeds and sore throat.    Eyes: Negative for visual disturbance.   Cardiovascular: Negative for chest pain, dyspnea on exertion, irregular heartbeat, leg swelling, near-syncope and syncope.   Respiratory: Negative for cough, hemoptysis and shortness of breath.    Hematologic/Lymphatic: Does not bruise/bleed easily.   Skin: Negative for color change, flushing, poor wound healing and rash.        INC:  No drainage  No odor  No reddness   Musculoskeletal: Negative for muscle cramps and muscle weakness.   Gastrointestinal: Negative for anorexia and change in bowel habit.   Genitourinary: Negative for hematuria.   Neurological: Negative for brief paralysis, difficulty with concentration, dizziness, light-headedness, loss of balance, numbness, paresthesias and sensory change.   Psychiatric/Behavioral: Negative for altered mental status.       Objective     Vital Signs  Temp:  [96.7 °F (35.9 °C)-97.8 °F (36.6 °C)] 97.6 °F (36.4 °C)  Heart Rate:  [55-94] 73  Resp:  [16-22] 21  BP: (103-163)/(51-72) 148/70  Arterial Line BP: (100-167)/(42-78) 145/54  Body mass index is 25.69 kg/(m^2).    Intake/Output Summary (Last 24 hours) at 03/15/17 0858  Last data filed at 03/15/17 0855   Gross per 24 hour   Intake   3673 ml   Output   2125 ml   Net   1548 ml     I/O this shift:  In: 859 [P.O.:720;  I.V.:139]  Out: -     Wt Readings from Last 3 Encounters:   03/15/17 179 lb 0.2 oz (81.2 kg)   03/08/17 175 lb (79.4 kg)   02/23/17 176 lb 1.6 oz (79.9 kg)           Physical Exam   Physical Exam   Constitutional: He is oriented to person, place, and time. He appears well-nourished.   HENT:   Head: Normocephalic.   Mouth/Throat: Oropharynx is clear and moist.   Eyes: Conjunctivae and EOM are normal. Pupils are equal, round, and reactive to light.   Neck: Neck supple. No JVD present. No tracheal deviation present.   Cardiovascular: Normal rate, regular rhythm, normal heart sounds and intact distal pulses.    Pulmonary/Chest: Effort normal and breath sounds normal. No stridor. No respiratory distress.   Abdominal: Soft. Bowel sounds are normal.   Musculoskeletal: He exhibits no edema or tenderness.   Neurological: He is alert and oriented to person, place, and time. No cranial nerve deficit.   Skin: Skin is warm and dry. No erythema. No pallor.   Left CEA incision clean and dry   Drain removed     Psychiatric: Judgment normal.   Nursing note and vitals reviewed.        Results Review:      Imaging Results (last 24 hours)     Procedure Component Value Units Date/Time    FL C Arm During Surgery [[id number]] Resulted:  03/14/17 1612     Updated:  03/14/17 1612          Lab Results (last 24 hours)     Procedure Component Value Units Date/Time    Tissue Exam [[id number]] Collected:  03/14/17 0945    Specimen:  Lymph Node from Neck, Tissue from Carotid Plaque Updated:  03/14/17 1326                            PT/INR:  No results found for: PROTIME/No results found for: INR            albuterol 2.5 mg Nebulization Q8H - RT   amLODIPine 10 mg Oral Q24H   aspirin 81 mg Oral Daily   atorvastatin 20 mg Oral Nightly   clopidogrel 75 mg Oral Nightly   famotidine 20 mg Intravenous BID   Or      famotidine 20 mg Oral BID   labetalol 200 mg Oral Q12H   lidocaine 1 patch Transdermal Q24H       dextrose 5 % and sodium chloride 0.45 % 75  mL/hr Last Rate: Stopped (03/15/17 0753)   nitroglycerin 5-200 mcg/min Last Rate: Stopped (03/14/17 1954)         Patient Active Problem List   Diagnosis Code   • JUAN on CPAP G47.33   • Malignant neoplasm of right lung C34.91   • Closed fracture of one rib of right side with nonunion S22.31XK   • Rib pain on right side R07.81   • Coronary artery disease involving native coronary artery of native heart without angina pectoris I25.10   • PVD (peripheral vascular disease) I73.9   • Essential hypertension I10   • Mixed hyperlipidemia E78.2   • COPD (chronic obstructive pulmonary disease) J44.9   • Iron deficiency anemia due to chronic blood loss D50.0   • Stenosis of left carotid artery I65.22       Assessment and Plan      1.  Satisfactory Postop:  Surgical pain controlled.  Eating well  Ambulating independently.  Bowel function returned.  Completing ADLs.   2.  Carotid Stenosis:  LEFT  Postop LEFT CAROTID ENDARTERECTOMY  CAROTID CEREBRAL ARTERIOGRAM  No neurosensory symptoms   Juanito Pruitt is to continue beta blocker, antiplatelet, and statin therapy.   3.  Postoperative Pain:  Well controlled with norco (10 mg hydrocodone in 24)  Reviewed risks, benefits, and habit forming potential and weaning from narcotic medication.  Patient understands and wishes to receive prescription.  Prescription written for Norco 5/325 # 40.  Patient understands 1 prescription only..  4.  Rehabilitation/Dispostion:  Independent with ADL  Discharge home.               This document has been electronically signed by ROSALIO Fleming on March 15, 2017 8:58 AM

## 2017-03-15 NOTE — DISCHARGE SUMMARY
Date of Discharge:  3/15/2017    Discharge Diagnosis:   Stenosis of left carotid artery [I65.22]    Problem List:  Active Problems:    Stenosis of left carotid artery      Presenting Problem/History of Present Illness  Stenosis of left carotid artery [I65.22]  Stenosis of left carotid artery [I65.22]  History of coronary artery disease  History of Lung cancer post RIGHT Pneumonectomy       Hospital Course  Patient is a 61 y.o. male presented with asymptomatic Left carotid stenosis.   Preoperative evaluation demonstrated Carotid duplex ARIC 0% post R CEA  LICA 75-80%.  Mr Pruitt has no neurovascular deficits. Adequate preop studies were completed and the patient was scheduled electively. Operative day Juanito Pruitt admitted through [facility], taken to operating suite and placed under general anesthesia.  Underwent said procedure without complications or difficulty. He was weaned easily from mechanical ventilation and extubated in the recovery room placed on oxygen per nasal cannula and further transferred to CCU for further care and monitoring. Juanito Pruitt remained hemodynamically and neurovascularly stable immediately postop.  POD1 he were out of the bed to the chair tolerating breakfast without any difficulty swallowing.  Drains were removed, Incisions and vital signs are stable for dc home today in satisfactory condition. .      Procedures Performed  Procedure(s):  CAROTID ENDARTERECTOMY, ARTERIOGRAM        Consults:   Consults     No orders found for last 30 day(s).          Pertinent Test Results: Final Path pending    Condition on Discharge:  Satisfactory    Vital Signs  Temp:  [96.7 °F (35.9 °C)-97.8 °F (36.6 °C)] 97.6 °F (36.4 °C)  Heart Rate:  [55-94] 73  Resp:  [16-22] 21  BP: (103-163)/(51-72) 127/55  Arterial Line BP: (100-167)/(42-78) 145/54      Physical Exam   Physical Exam   Constitutional: He is oriented to person, place, and time. He appears well-nourished.   HENT:   Head: Normocephalic.    Mouth/Throat: Oropharynx is clear and moist.   Eyes: Conjunctivae and EOM are normal. Pupils are equal, round, and reactive to light.   Neck: Neck supple. No JVD present. No tracheal deviation present.   Cardiovascular: Normal rate, regular rhythm, normal heart sounds and intact distal pulses.   Pulmonary/Chest: Effort normal and breath sounds normal. No stridor. No respiratory distress.   Abdominal: Soft. Bowel sounds are normal.   Musculoskeletal: He exhibits no edema or tenderness.   Neurological: He is alert and oriented to person, place, and time. No cranial nerve deficit.   Skin: Skin is warm and dry. No erythema. No pallor.   Left CEA incision clean and dry   Drain removed    Psychiatric: Judgment normal.   Nursing note and vitals reviewed.           Discharge Disposition  Home or Self Care    Discharge Medications   Juanito Pruitt   Home Medication Instructions LUANN:[id number]    Printed on:03/15/17 6929   Medication Information                      acetaminophen (TYLENOL) 325 MG tablet  Take 2 tablets by mouth Every 4 (Four) Hours As Needed for Mild Pain (1-3).             albuterol (PROVENTIL HFA;VENTOLIN HFA) 108 (90 BASE) MCG/ACT inhaler  Inhale 2 puffs every 4 (four) hours as needed for wheezing.             amLODIPine (NORVASC) 10 MG tablet  TAKE ONE TABLET BY MOUTH DAILY             aspirin 81 MG EC tablet  Take 81 mg by mouth Daily. Instructed to cont taking             clopidogrel (PLAVIX) 75 MG tablet  Take 1 tablet by mouth Daily.             famotidine (PEPCID) 40 MG tablet  Take 40 mg by mouth 2 (Two) Times a Day.             ferrous sulfate 325 (65 FE) MG tablet  Take 325 mg by mouth Every Night.             folic acid (FOLVITE) 1 MG tablet  Take 1 mg by mouth Every Night.             furosemide (LASIX) 20 MG tablet  Take 20 mg by mouth Daily. 0.5 tablet daily             HYDROcodone-acetaminophen (NORCO) 5-325 MG per tablet  Take 1 tablet by mouth Every 4 (Four) Hours As Needed for  Moderate Pain (4-6) or Severe Pain (7-10) (Postoperative pain) for up to 9 days.             labetalol (NORMODYNE) 200 MG tablet  TAKE ONE TABLET BY MOUTH TWICE A DAY             lidocaine (LIDODERM) 5 %  Place 1 patch on the skin Daily. Remove & Discard patch within 12 hours or as directed by MD             rosuvastatin (CRESTOR) 10 MG tablet  Take 10 mg by mouth Every Night.             sennosides-docusate sodium (SENOKOT-S) 8.6-50 MG tablet  Take 1 tablet by mouth 2 (Two) Times a Day.             vitamin B-12 (CYANOCOBALAMIN) 100 MCG tablet  Take 50 mcg by mouth Daily.                 Discharge Diet :  Low fat low cholesterol recommended.       Activity at Discharge  Activity Instructions     Discharge Activity       1) No driving for1 weeks:[id number]} and no longer taking narcotics.   2) Return to school / work in 5 days   3) May shower  4) Do not lift / push / pull more then 10 lbs.                 Follow-up Appointments  Future Appointments  Date Time Provider Department Center   3/23/2017 3:20 PM ROSALIO [place] TINA CTV MAD None   4/27/2017 8:20 AM Chay Lim MD [facility] None   5/4/2017 1:20 PM ROSALIO [place] CTV MAD None   5/18/2017 3:00 PM Eber Thompson MD [facility] HRT MAD None   5/30/2017 3:00 PM [facility] OP INFU PROCEDURE CHAIR  MAD OPI MAD   6/1/2017 3:00 PM Fly Bustamante MD [facility]AD MAD   9/15/2017 3:00 PM SLEEP CLINIC [facility] SM MAD None     Additional Instructions for the Follow-ups that You Need to Schedule     Notify Physician or Go To The ED For the Following Conditions    As directed    Discharge instructions include no heavy lifting anything greater than 10lbs for approximately 1 week.  No sex or driving for 1 weeks. Printed information given to the patient with advancement of activities weekly.  Risks and benefits of narcotic medications and weaning postoperatively have been discussed. Clean operative site with antibacterial soap/water, pat dry. Keep open  to air unless draining, then may apply dry dressing.  No ointments or creams unless prescribed by provider. Signs and symptoms of infection including drainage from operative site, redness, swelling, with associated fever and/or chills notify Heart and Vascular center immediately for wound check. If you should experience any neurological symptoms including but not limited to visual or speech disturbances confusion, seizures, or weakness of limbs of one side of your body notify Heart and Vascular center immediately for evaluation or if after hours present to the nearest Emergency Department.  Patient verbalizes understanding of discharge instructions, all questions are answered, follow up appointments have been made, they are discharged home in stable condition.       US Carotid Bilateral    Mar 20, 2017    Reason for Exam:  left caortid us   Post L CEA                 Test Results Pending at Discharge   Order Current Status    Tissue Exam In process          Time: 45 minutes           This document has been electronically signed by ROSALIO Fleming on March 15, 2017 9:38 AM

## 2017-03-23 ENCOUNTER — OFFICE VISIT (OUTPATIENT)
Dept: CARDIAC SURGERY | Facility: CLINIC | Age: 62
End: 2017-03-23

## 2017-03-23 VITALS
TEMPERATURE: 98.5 F | SYSTOLIC BLOOD PRESSURE: 125 MMHG | WEIGHT: 176.1 LBS | HEIGHT: 70 IN | HEART RATE: 89 BPM | OXYGEN SATURATION: 95 % | DIASTOLIC BLOOD PRESSURE: 70 MMHG | BODY MASS INDEX: 25.21 KG/M2

## 2017-03-23 DIAGNOSIS — Z48.812 SURGICAL AFTERCARE, CIRCULATORY SYSTEM: ICD-10-CM

## 2017-03-23 DIAGNOSIS — I65.22 STENOSIS OF LEFT CAROTID ARTERY: Primary | ICD-10-CM

## 2017-03-23 PROCEDURE — 99024 POSTOP FOLLOW-UP VISIT: CPT | Performed by: NURSE PRACTITIONER

## 2017-03-24 NOTE — PROGRESS NOTES
Subjective   Patient ID: Juanito Pruitt is a 61 y.o. male is here today for follow-up SP LEFT CEA.  CC;  Denies pain Incision sore  No neurosensory symptoms  Wants to do more  History of Present Illness  Peripheral Vascular Disease:    PCP:  Dr Dalton  Cardiology:  Dr Thompson  GI:  Dr Moya  Hematology:  Dr Randolph    60yr man with PVD, Carotid Stenosis, CAD, HTN, dyslipidemia, GERD.   RIGHT groin pain resolved.  progressive anemia, workup in progress.  Carotid stenosis requires treatment.  No hemoptysis, cough, wt loss.  Claudication None, No rest pain, No ischemic tissue loss  No amaurosis fugax, No TIA, No stroke  No other associated signs, symptoms or modifying factors.    12/2011:  bilateral renal artery stents  4/20/2012 JUAN:  RIGHT 1.1 triphasic, LEFT 1.1 triphasic  6/7/2012 Exercise JUAN:  RIGHT .89 to .69, LEFT 1.0 to .87  4/25/2014 US RIGHT groin:  [id number] fluid collection deep to femoral vessels.  5/13/2014 US drainage seroma:  10ml serous fluid, no residual seroma.  5/20/2014 US RIGHT groin:  5x2x0.5cm fluid collection deep to femoral vessels.  7/21/2014 Renal Artery Duplex:  RIGHT maxPSV 222cm/s,RI .87, RAR 2.8.  LEFT maxPSV 271cm/s, RI .88, RAR 3.4.  patent bilateral renal artery stents.    9/12/2012 Carotid Duplex:  ARIC 70-75% antegrade.  LICA <50% antegrade (maxPSV 96cm/s).  9/17/2012 CTA Carotids:  ARIC 60% small ulcerative plaque.  LICA mild irregularity.  10/11/2013 CTA Carotids:  ARIC 85%, LICA mild irregularity  12/3/13 RIGHT CEA   1/13/2014 Carotid Duplex:  ARIC 0-15%  7/21/2014 Carotid Duplex:  ARIC 0-15% antegrade.  LICA 50-79% antegrade (maxPSV 145cm/s).  1/26/2015 Carotid Duplex:  ARIC 0-15% antegrade.  LICA 50-79% antegrade (maxPSV 147cm/s).  2/1/2016  Carotid Duplex:  ARIC 0-15% antegrade.  LICA 50-79% antegrade ([id number] cm/s, ratio 2.5) .   2/25/2016 CTA Carotids:  ARIC no significant disease, LICA 75-80%.  03/23/17  LEFT CEA     8/2011 CXR:  LEFT lower lobe  atelectasis.  2016 CXR:  RIGHT paratracheal mass, medial segment atelectasis.  2016 PET CT:  RIGHT upper lobe post obstructive atelectasis.  mass obstructing upper lobe bronchus SUV 10.  no significant adenopathy.  liver, adrenals ok.  2016 PFT:  FVC 4.2 (95%), FEV1 1.7 (47%), DLCO 48%  2016 AB.42/41/57/26/90%  2016 Quantitative lung perfusion scan:  RIGHT 31%, LEFT 69% (predicted post pneumonectomy FEV1 1100ml, adequate for resection)  16  Right thoracotomy with Carinal Pneumonectomy, Thoracic & Mediastinal Lymphadenetomy, Fiberoptic Bronchoscopy.    7/15/16  Hospital DC   PATH:  FINAL  RIGHT LUNG:  SQUAMOUS CARCINOMA, MODERATELY DIFFERENTIATED OF       UPPER LOBE BRONCHUS (4.5 CM IN GREATEST DIMENSION).  THE NEOPLASM IS 0.5 CM AWAY FROM THE BRONCHIAL MARGIN       AND FOUR PERIBRONCHIAL LYMPH NODES SHOW NO       EVIDENCE OF MALIGNANCY.  SUBPLEURAL ABSCESS DISTAL TO THE TUMOR.  NO EVIDENCE OF ANGIOLYMPHATIC INVOLVEMENT.  B.    LEVEL 10 LYMPH NODES (2):  NO SIGNIFICANT PATHOLOGIC DIAGNOSIS.  C.    LEVEL 7 LYMPH NODES (3):  NO SIGNIFICANT PATHOLOGIC DIAGNOSIS.  16  Chest xray:  expected postoperative changes following right pneumonectomy. Left lung appears clear. There is a displaced right lateral sixth rib fracture it appears to be acute.   16  Chest xray:  Old healed left rib fractures noted. Displaced right sixth rib fracture unchanged in appearance. CONCLUSION- No significant change in near complete opacification of the right hemithorax. Left lung remains clear.   11/15/16  Chest xray:  consistent with postpneumonectomy changes. There is again noted a displaced right lateral sixth rib fracture. The patient is status post median sternotomy. Emphysema is noted in the left lung. The left lung is otherwise clear. CONCLUSION- No acute disease.   2017 CT Chest:  No new nodules.  Possible RIGHT paratracheal lymph node vs postop change.  Liver adrenal ok.    The following  portions of the patient's history were reviewed and updated as appropriate: allergies, current medications, past family history, past medical history, past social history, past surgical history and problem list.    Current Outpatient Prescriptions:   •  acetaminophen (TYLENOL) 325 MG tablet, Take 2 tablets by mouth Every 4 (Four) Hours As Needed for Mild Pain (1-3)., Disp: 40 tablet, Rfl: 0  •  albuterol (PROVENTIL HFA;VENTOLIN HFA) 108 (90 BASE) MCG/ACT inhaler, Inhale 2 puffs every 4 (four) hours as needed for wheezing., Disp: , Rfl:   •  amLODIPine (NORVASC) 10 MG tablet, TAKE ONE TABLET BY MOUTH DAILY, Disp: 30 tablet, Rfl: 5  •  aspirin 81 MG EC tablet, Take 81 mg by mouth Daily. Instructed to cont taking, Disp: , Rfl:   •  clopidogrel (PLAVIX) 75 MG tablet, Take 1 tablet by mouth Daily. (Patient taking differently: Take 75 mg by mouth Every Night. To cont taking), Disp: 30 tablet, Rfl: 5  •  famotidine (PEPCID) 40 MG tablet, Take 40 mg by mouth 2 (Two) Times a Day., Disp: , Rfl:   •  ferrous sulfate 325 (65 FE) MG tablet, Take 325 mg by mouth Every Night., Disp: , Rfl:   •  folic acid (FOLVITE) 1 MG tablet, Take 1 mg by mouth Every Night., Disp: , Rfl:   •  furosemide (LASIX) 20 MG tablet, Take 20 mg by mouth Daily. 0.5 tablet daily, Disp: , Rfl:   •  HYDROcodone-acetaminophen (NORCO) 5-325 MG per tablet, Take 1 tablet by mouth Every 4 (Four) Hours As Needed for Moderate Pain (4-6) or Severe Pain (7-10) (Postoperative pain) for up to 9 days., Disp: 40 tablet, Rfl: 0  •  labetalol (NORMODYNE) 200 MG tablet, TAKE ONE TABLET BY MOUTH TWICE A DAY, Disp: 60 tablet, Rfl: 5  •  lidocaine (LIDODERM) 5 %, Place 1 patch on the skin Daily. Remove & Discard patch within 12 hours or as directed by MD, Disp: 30 patch, Rfl: 0  •  rosuvastatin (CRESTOR) 10 MG tablet, Take 10 mg by mouth Every Night., Disp: , Rfl:   •  sennosides-docusate sodium (SENOKOT-S) 8.6-50 MG tablet, Take 1 tablet by mouth 2 (Two) Times a Day., Disp:  40 tablet, Rfl: 0  •  vitamin B-12 (CYANOCOBALAMIN) 100 MCG tablet, Take 50 mcg by mouth Daily., Disp: , Rfl:     Review of Systems   Constitution: Negative for chills, decreased appetite, fever and malaise/fatigue.        VAS 1 Incisional soreness only    HENT: Negative for headaches, hearing loss, hoarse voice, nosebleeds and sore throat.    Cardiovascular: Negative for chest pain, claudication, cyanosis, dyspnea on exertion, leg swelling, near-syncope and syncope.   Respiratory: Negative for cough, hemoptysis and shortness of breath.    Hematologic/Lymphatic: Does not bruise/bleed easily.   Skin: Negative for color change and poor wound healing.        INC:  No drainage  No odor  No reddness   Musculoskeletal: Negative for joint swelling, muscle cramps and muscle weakness.   Gastrointestinal: Negative for anorexia, change in bowel habit, hematemesis, melena and nausea.   Genitourinary: Negative for hematuria.   Neurological: Negative for brief paralysis, difficulty with concentration, dizziness, focal weakness, light-headedness, numbness, paresthesias and sensory change.   Psychiatric/Behavioral: Negative for altered mental status.   Allergic/Immunologic: Negative for environmental allergies.        Objective   Physical Exam   Constitutional: He appears well-nourished.   HENT:   Head: Normocephalic.   Mouth/Throat: Oropharynx is clear and moist.   Eyes: Conjunctivae and EOM are normal. Pupils are equal, round, and reactive to light.   Neck: Neck supple. No JVD present. No tracheal deviation present.   Cardiovascular: Normal rate, regular rhythm, normal heart sounds and intact distal pulses.    Pulses:       Carotid pulses are 1+ on the right side with bruit, and 1+ on the left side.       Radial pulses are 2+ on the right side, and 2+ on the left side.        Posterior tibial pulses are 2+ on the right side, and 2+ on the left side.   Pulmonary/Chest: Effort normal. No stridor.   Abdominal: Soft. Bowel sounds are  normal.   Musculoskeletal: He exhibits no edema or tenderness.   Neurological: He is alert. No cranial nerve deficit.   Skin: Skin is warm and dry. No rash noted. No erythema. No pallor.   L CEA incision clean and dry Prineo inplace.    Psychiatric: Judgment normal.   Nursing note and vitals reviewed.          Assessment/Plan   Independent Review of Radiographic Studies:    None with today visit   Pt concerned about possible lymph node on CT scan.  Dr Ray reviewed again.  Possible post op change vs possible node.  Repeat CT scan 6 mths   1. Stenosis of left carotid artery  SP L CEA  No bruit   No neurosensory symptoms  Continue Juanito Pruitt is to continue beta blocker, antiplatelet, and statin therapy.     - Duplex Carotid Ultrasound CAR; Future  FU 5/4/17 as schedule unless problems occur  If you should experience any neurological symptoms including but not limited to visual or speech disturbances confusion, seizures, or weakness of limbs of one side of your body notify Heart and Vascular center immediately for evaluation or if after hours present to the nearest Emergency Department.     2. Surgical aftercare, circulatory system  Clean operative site with antibacterial soap/water, pat dry. Keep open to air unless draining, then may apply dry dressing.  No ointments or creams unless prescribed by provider.  Signs and symptoms of infection including drainage from operative site, redness, swelling, with associated fever and/or chills notify Heart and Vascular center immediately for wound check.

## 2017-04-05 RX ORDER — FUROSEMIDE 40 MG/1
TABLET ORAL
Qty: 30 TABLET | Refills: 2 | Status: SHIPPED | OUTPATIENT
Start: 2017-04-05 | End: 2017-10-02 | Stop reason: SDUPTHER

## 2017-04-27 ENCOUNTER — OFFICE VISIT (OUTPATIENT)
Dept: ORTHOPEDIC SURGERY | Facility: CLINIC | Age: 62
End: 2017-04-27

## 2017-04-27 VITALS — HEIGHT: 70 IN | BODY MASS INDEX: 25.05 KG/M2 | WEIGHT: 175 LBS

## 2017-04-27 DIAGNOSIS — I10 ESSENTIAL HYPERTENSION: Primary | ICD-10-CM

## 2017-04-27 DIAGNOSIS — Z96.641 PRESENCE OF RIGHT ARTIFICIAL HIP JOINT: ICD-10-CM

## 2017-04-27 PROCEDURE — 99213 OFFICE O/P EST LOW 20 MIN: CPT | Performed by: ORTHOPAEDIC SURGERY

## 2017-04-27 NOTE — PROGRESS NOTES
Juanito Pruitt is a 61 y.o. male returns for     Chief Complaint   Patient presents with   • Right Hip - Follow-up     1 year f/u with xray       HISTORY OF PRESENT ILLNESS: Patient not having any problems. No new complaints.   Doing all activities.       CONCURRENT MEDICAL HISTORY:    Past Medical History:   Diagnosis Date   • Allergic rhinitis    • Atherosclerosis of native arteries of the extremities with ulceration     Bilateral legs   • Benign hypertension    • Carotid artery stenosis    • Coagulopathy     on plavix and asa (instructed to continue per md)   • Coronary atherosclerosis    • Encounter for surgical aftercare following surgery of respiratory system     Right thoracotomy with Carinal Pneumonectomy, Thoracic & Mediastinal Lymphadenetomy, Fiberoptic Bronchoscopy. 7/13/16      • Essential hypertension    • Hypercholesterolemia    • Inguinal pain     small right groin seroma   • Malignant neoplasm of lung     Right upper lobe mass suspicious for lung cancer   • JUAN on CPAP    • PVD (peripheral vascular disease)    • Renal artery stenosis    • Simple chronic bronchitis    • Sleep apnea    • SOB (shortness of breath)    • Squamous cell carcinoma of lung    • Surgical follow-up care        Allergies   Allergen Reactions   • Penicillins Itching     Rash,itching         Current Outpatient Prescriptions:   •  acetaminophen (TYLENOL) 325 MG tablet, Take 2 tablets by mouth Every 4 (Four) Hours As Needed for Mild Pain (1-3)., Disp: 40 tablet, Rfl: 0  •  albuterol (PROVENTIL HFA;VENTOLIN HFA) 108 (90 BASE) MCG/ACT inhaler, Inhale 2 puffs every 4 (four) hours as needed for wheezing., Disp: , Rfl:   •  amLODIPine (NORVASC) 10 MG tablet, TAKE ONE TABLET BY MOUTH DAILY, Disp: 30 tablet, Rfl: 5  •  aspirin 81 MG EC tablet, Take 81 mg by mouth Daily. Instructed to cont taking, Disp: , Rfl:   •  clopidogrel (PLAVIX) 75 MG tablet, Take 1 tablet by mouth Daily. (Patient taking differently: Take 75 mg by mouth Every  Night. To cont taking), Disp: 30 tablet, Rfl: 5  •  famotidine (PEPCID) 40 MG tablet, Take 40 mg by mouth 2 (Two) Times a Day., Disp: , Rfl:   •  ferrous sulfate 325 (65 FE) MG tablet, Take 325 mg by mouth Every Night., Disp: , Rfl:   •  folic acid (FOLVITE) 1 MG tablet, Take 1 mg by mouth Every Night., Disp: , Rfl:   •  furosemide (LASIX) 40 MG tablet, TAKE ONE-HALF TABLET BY MOUTH DAILY, Disp: 30 tablet, Rfl: 2  •  labetalol (NORMODYNE) 200 MG tablet, TAKE ONE TABLET BY MOUTH TWICE A DAY, Disp: 60 tablet, Rfl: 5  •  lidocaine (LIDODERM) 5 %, Place 1 patch on the skin Daily. Remove & Discard patch within 12 hours or as directed by MD, Disp: 30 patch, Rfl: 0  •  rosuvastatin (CRESTOR) 10 MG tablet, Take 10 mg by mouth Every Night., Disp: , Rfl:   •  sennosides-docusate sodium (SENOKOT-S) 8.6-50 MG tablet, Take 1 tablet by mouth 2 (Two) Times a Day., Disp: 40 tablet, Rfl: 0  •  vitamin B-12 (CYANOCOBALAMIN) 100 MCG tablet, Take 50 mcg by mouth Daily., Disp: , Rfl:     Past Surgical History:   Procedure Laterality Date   • CARDIAC CATHETERIZATION  07/12/2011    Multi-vessel coronary artery disease with critical lesion noted in the LAD coronary artery, left circumflex coronary artery and right coronary artery as described above. The LAD appeared to be chronically occluded    • CAROTID ENDARTERECTOMY  12/03/2013    Right carotid endarterectomy. Carotid cerebral arteriogram   • CORONARY ARTERY BYPASS GRAFT  07/14/2011    LIMA->LAD SVG->OM2 SVG->OM3 SVG->steve branch   • ESOPHAGOSCOPY / EGD  12/06/2011    With tube-Normal hypopharynx, GE junction, Duodenum, symmetrical & patent pylorus. Tongue of columnar epithelium that could be a Ivy's esophagus present. Multiple biopsies performed. Chronic gastritis in antrum. Biopsy taken   • HIP SURGERY  01/18/2016    Right total hip arthroplasty anterior approach.   • LUNG REMOVAL, TOTAL  07/13/2016    Right thoracotomy with Carinal Pneumonectomy, Thoracic & Mediastinal  "Lymphadenetomy, Fiberoptic Bronchoscopy   • RI THROMBOENDARTECTMY NECK,NECK INCIS Left 3/14/2017    Procedure: CAROTID ENDARTERECTOMY, ARTERIOGRAM ;  Surgeon: Brayden Ray MD;  Location: [facility];  Service: Vascular   • THORACOTOMY  07/13/2016    Right thoracotomy with carinal pneumonectomy.Thoracic and mediastinal lymphadenectomy.Fiberoptic bronchoscopy   • TRANSESOPHAGEAL ECHOCARDIOGRAM (LEIDA)  07/12/2011    Without color flow-LV dysfunction with an EF of 40-45% with hypokinesis/akinesis of the distal septum and apex. Mild left atrial enlargement. No significant valvular abnormalities noted. No pericardial effusion noted       ROS  No fevers or chills.  No chest pain or shortness of air.  No GI or  disturbances.    PHYSICAL EXAMINATION:       Ht 70\" (177.8 cm)  Wt 175 lb (79.4 kg)  BMI 25.11 kg/m2    Physical Exam   Constitutional: He is oriented to person, place, and time. He appears well-developed and well-nourished.   Neurological: He is alert and oriented to person, place, and time.   Psychiatric: He has a normal mood and affect. His behavior is normal. Judgment and thought content normal.       GAIT:     []  Normal  []  Antalgic    Assistive device: []  None  []  Walker     []  Crutches  []  Cane     []  Wheelchair  []  Stretcher    Right Hip Exam     Tenderness   The patient is experiencing no tenderness.         Range of Motion   The patient has normal right hip ROM.    Muscle Strength   The patient has normal right hip strength.    Tests   KY: negative  Fadir:  Negative FADIR test    Other   Erythema: absent  Sensation: normal  Pulse: present    Comments:  Stable exam              Xr Hip With Or Without Pelvis 2 - 3 View Right    Addendum Date: 4/30/2017 Addendum:   AP pelvis with AP and Lateral of RIGHT hip: 04/30/17 at 11:20 PM by Chay Lim MD    Result Date: 4/27/2017  Narrative: AP pelvis with AP and lateral of the left hip show acceptable position and alignment of right " total hip arthroplasty.  No sign of implant loosening or failure is noted.  Appropriate sizing is noted.  Mild to moderate arthritic change noted in the left hip with lateral osteophytes present.  No acute bony abnormality is noted.04/27/17 at 12:11 PM by Chay Lim MD             ASSESSMENT:    Diagnoses and all orders for this visit:    Essential hypertension    Presence of right artificial hip joint        PLAN    Activity as tolerated.  No restrictions.  F/u as needed.          Chay Lim MD

## 2017-05-04 ENCOUNTER — OFFICE VISIT (OUTPATIENT)
Dept: CARDIAC SURGERY | Facility: CLINIC | Age: 62
End: 2017-05-04

## 2017-05-04 VITALS
DIASTOLIC BLOOD PRESSURE: 62 MMHG | TEMPERATURE: 97 F | OXYGEN SATURATION: 97 % | HEART RATE: 79 BPM | BODY MASS INDEX: 25.62 KG/M2 | SYSTOLIC BLOOD PRESSURE: 126 MMHG | HEIGHT: 70 IN | WEIGHT: 179 LBS

## 2017-05-04 DIAGNOSIS — Z48.812 SURGICAL AFTERCARE, CIRCULATORY SYSTEM: ICD-10-CM

## 2017-05-04 DIAGNOSIS — I65.22 STENOSIS OF LEFT CAROTID ARTERY: Primary | ICD-10-CM

## 2017-05-04 DIAGNOSIS — Z79.899 DRUG THERAPY: ICD-10-CM

## 2017-05-04 DIAGNOSIS — C34.91 MALIGNANT NEOPLASM OF RIGHT LUNG, UNSPECIFIED PART OF LUNG (HCC): ICD-10-CM

## 2017-05-04 PROCEDURE — 99024 POSTOP FOLLOW-UP VISIT: CPT | Performed by: NURSE PRACTITIONER

## 2017-05-18 ENCOUNTER — OFFICE VISIT (OUTPATIENT)
Dept: CARDIOLOGY | Facility: CLINIC | Age: 62
End: 2017-05-18

## 2017-05-18 VITALS
HEART RATE: 87 BPM | BODY MASS INDEX: 24.77 KG/M2 | SYSTOLIC BLOOD PRESSURE: 118 MMHG | HEIGHT: 70 IN | DIASTOLIC BLOOD PRESSURE: 65 MMHG | WEIGHT: 173 LBS

## 2017-05-18 DIAGNOSIS — I10 ESSENTIAL HYPERTENSION: ICD-10-CM

## 2017-05-18 DIAGNOSIS — E78.2 MIXED HYPERLIPIDEMIA: ICD-10-CM

## 2017-05-18 DIAGNOSIS — I25.10 CORONARY ARTERY DISEASE INVOLVING NATIVE CORONARY ARTERY OF NATIVE HEART WITHOUT ANGINA PECTORIS: Primary | ICD-10-CM

## 2017-05-18 DIAGNOSIS — I73.9 PVD (PERIPHERAL VASCULAR DISEASE) (HCC): ICD-10-CM

## 2017-05-18 PROBLEM — Z85.9 HISTORY OF MALIGNANT NEOPLASM: Status: ACTIVE | Noted: 2017-02-01

## 2017-05-18 PROCEDURE — 99213 OFFICE O/P EST LOW 20 MIN: CPT | Performed by: INTERNAL MEDICINE

## 2017-05-23 ENCOUNTER — RESULTS ENCOUNTER (OUTPATIENT)
Dept: CARDIOLOGY | Facility: CLINIC | Age: 62
End: 2017-05-23

## 2017-05-23 DIAGNOSIS — I10 ESSENTIAL HYPERTENSION: ICD-10-CM

## 2017-05-23 DIAGNOSIS — I25.10 CORONARY ARTERY DISEASE INVOLVING NATIVE CORONARY ARTERY OF NATIVE HEART WITHOUT ANGINA PECTORIS: ICD-10-CM

## 2017-05-23 DIAGNOSIS — I73.9 PVD (PERIPHERAL VASCULAR DISEASE) (HCC): ICD-10-CM

## 2017-05-23 DIAGNOSIS — E78.2 MIXED HYPERLIPIDEMIA: ICD-10-CM

## 2017-06-01 ENCOUNTER — LAB (OUTPATIENT)
Dept: ONCOLOGY | Facility: HOSPITAL | Age: 62
End: 2017-06-01

## 2017-06-01 ENCOUNTER — OFFICE VISIT (OUTPATIENT)
Dept: ONCOLOGY | Facility: CLINIC | Age: 62
End: 2017-06-01

## 2017-06-01 VITALS
RESPIRATION RATE: 18 BRPM | TEMPERATURE: 98.1 F | WEIGHT: 174.3 LBS | DIASTOLIC BLOOD PRESSURE: 69 MMHG | HEART RATE: 78 BPM | SYSTOLIC BLOOD PRESSURE: 104 MMHG | BODY MASS INDEX: 25.01 KG/M2

## 2017-06-01 DIAGNOSIS — D50.0 IRON DEFICIENCY ANEMIA DUE TO CHRONIC BLOOD LOSS: Primary | ICD-10-CM

## 2017-06-01 DIAGNOSIS — D50.0 IRON DEFICIENCY ANEMIA DUE TO CHRONIC BLOOD LOSS: ICD-10-CM

## 2017-06-01 LAB
BASOPHILS # BLD AUTO: 0.06 10*3/MM3 (ref 0–0.2)
BASOPHILS NFR BLD AUTO: 0.4 % (ref 0–2)
DEPRECATED RDW RBC AUTO: 45.7 FL (ref 35.1–43.9)
EOSINOPHIL # BLD AUTO: 0.73 10*3/MM3 (ref 0–0.7)
EOSINOPHIL NFR BLD AUTO: 4.9 % (ref 0–7)
ERYTHROCYTE [DISTWIDTH] IN BLOOD BY AUTOMATED COUNT: 14 % (ref 11.5–14.5)
FERRITIN SERPL-MCNC: 100 NG/ML (ref 17.9–464)
FOLATE SERPL-MCNC: 15.5 NG/ML (ref 2.76–21)
HCT VFR BLD AUTO: 37.2 % (ref 39–49)
HGB BLD-MCNC: 12.5 G/DL (ref 13.7–17.3)
IMM GRANULOCYTES # BLD: 0.06 10*3/MM3 (ref 0–0.02)
IMM GRANULOCYTES NFR BLD: 0.4 % (ref 0–0.5)
IRON 24H UR-MRATE: 52 MCG/DL (ref 49–181)
IRON SATN MFR SERPL: 18 % (ref 20–55)
LYMPHOCYTES # BLD AUTO: 1.83 10*3/MM3 (ref 0.6–4.2)
LYMPHOCYTES NFR BLD AUTO: 12.2 % (ref 10–50)
MCH RBC QN AUTO: 29.9 PG (ref 26.5–34)
MCHC RBC AUTO-ENTMCNC: 33.6 G/DL (ref 31.5–36.3)
MCV RBC AUTO: 89 FL (ref 80–98)
MONOCYTES # BLD AUTO: 0.72 10*3/MM3 (ref 0–0.9)
MONOCYTES NFR BLD AUTO: 4.8 % (ref 0–12)
NEUTROPHILS # BLD AUTO: 11.55 10*3/MM3 (ref 2–8.6)
NEUTROPHILS NFR BLD AUTO: 77.3 % (ref 37–80)
PLATELET # BLD AUTO: 245 10*3/MM3 (ref 150–450)
PMV BLD AUTO: 11.5 FL (ref 8–12)
RBC # BLD AUTO: 4.18 10*6/MM3 (ref 4.37–5.74)
TIBC SERPL-MCNC: 289 MCG/DL (ref 261–462)
VIT B12 BLD-MCNC: 340 PG/ML (ref 239–931)
WBC NRBC COR # BLD: 14.95 10*3/MM3 (ref 3.2–9.8)

## 2017-06-01 PROCEDURE — 85025 COMPLETE CBC W/AUTO DIFF WBC: CPT | Performed by: INTERNAL MEDICINE

## 2017-06-01 PROCEDURE — 83550 IRON BINDING TEST: CPT | Performed by: INTERNAL MEDICINE

## 2017-06-01 PROCEDURE — 83540 ASSAY OF IRON: CPT | Performed by: INTERNAL MEDICINE

## 2017-06-01 PROCEDURE — 99213 OFFICE O/P EST LOW 20 MIN: CPT | Performed by: INTERNAL MEDICINE

## 2017-06-01 PROCEDURE — 82607 VITAMIN B-12: CPT | Performed by: INTERNAL MEDICINE

## 2017-06-01 PROCEDURE — 82728 ASSAY OF FERRITIN: CPT | Performed by: INTERNAL MEDICINE

## 2017-06-01 PROCEDURE — G0463 HOSPITAL OUTPT CLINIC VISIT: HCPCS | Performed by: INTERNAL MEDICINE

## 2017-06-01 PROCEDURE — 82746 ASSAY OF FOLIC ACID SERUM: CPT | Performed by: INTERNAL MEDICINE

## 2017-06-01 NOTE — PROGRESS NOTES
Oncology Diagnosis and Treatment:   1- Anemia: workup in the past as shown some multifactorial with iron deficiency, borderline vitamin B12 and folate level.   2- Status post intravenous Feraheme last dose was on December 29, 2016.  3- Stage IB squamous cell cancer of right lung, status post resection in July 2016.   4- The patient was offered adjuvant chemotherapy by Dr. Randolph but he refused.    Subjective:     Juanito Pruitt is a 61 y.o. male presents today for follow up.  Denied having any symptoms of chest pain, shortness of breath, nausea, vomiting or diarrhea. Has no mouth sores, skin rash or fatigue. No numbness or tingling sensations in the upper or lower extremities. No urinary symptoms. No fever, chills, rigors, night sweats, weight loss or loss of appetite.              Current Outpatient Prescriptions on File Prior to Visit   Medication Sig Dispense Refill   • albuterol (PROVENTIL HFA;VENTOLIN HFA) 108 (90 BASE) MCG/ACT inhaler Inhale 2 puffs every 4 (four) hours as needed for wheezing.     • amLODIPine (NORVASC) 10 MG tablet TAKE ONE TABLET BY MOUTH DAILY 30 tablet 5   • aspirin 81 MG EC tablet Take 81 mg by mouth Daily. Instructed to cont taking     • clopidogrel (PLAVIX) 75 MG tablet Take 1 tablet by mouth Daily. (Patient taking differently: Take 75 mg by mouth Every Night. To cont taking) 30 tablet 5   • famotidine (PEPCID) 40 MG tablet Take 40 mg by mouth 2 (Two) Times a Day.     • ferrous sulfate 325 (65 FE) MG tablet Take 325 mg by mouth Every Night.     • folic acid (FOLVITE) 1 MG tablet Take 1 mg by mouth Every Night.     • furosemide (LASIX) 40 MG tablet TAKE ONE-HALF TABLET BY MOUTH DAILY 30 tablet 2   • labetalol (NORMODYNE) 200 MG tablet TAKE ONE TABLET BY MOUTH TWICE A DAY 60 tablet 5   • lidocaine (LIDODERM) 5 % Place 1 patch on the skin Daily. Remove & Discard patch within 12 hours or as directed by MD 30 patch 0   • rosuvastatin (CRESTOR) 10 MG tablet Take 10 mg by mouth Every  Night.     • vitamin B-12 (CYANOCOBALAMIN) 100 MCG tablet Take 50 mcg by mouth Daily.       No current facility-administered medications on file prior to visit.             Review Of Systems   Comprehensive review of systems was done it was negative other than what mentioned in HPI       PHYSICAL EXAMINATION:   There were no vitals taken for this visit.   General Appearance: Appears healthy, alert, polite and cooperative   Head and neck: Mild pallor and no jaundice. wet mucous membranes without ulceration.   Lungs: Good bilateral air entry, clear to auscultation   Heart: Regular rate and rhythm,   Abdomen: Soft, nontender, not distended   Extremities: No edema.     Labs:     Reviewed.     ASSESSMENT/PLAN:   1- Anemia   Hb is dropping but still around its baseline  Will repeat another blood work in three months.    2- Leukocytosis  Since October   He said he was given steroid shot a month ago after URTI  Will watch for now    3- Lung caner   Followed by CTS  Wants to have all his scans done by the surgeon which he follow up with.  Recommend CT in August.    Follow up in three months.

## 2017-06-28 RX ORDER — LABETALOL 200 MG/1
TABLET, FILM COATED ORAL
Qty: 60 TABLET | Refills: 4 | Status: SHIPPED | OUTPATIENT
Start: 2017-06-28 | End: 2017-11-25 | Stop reason: SDUPTHER

## 2017-08-01 RX ORDER — CHOLECALCIFEROL (VITAMIN D3) 125 MCG
CAPSULE ORAL
Qty: 90 TABLET | Refills: 1 | Status: SHIPPED | OUTPATIENT
Start: 2017-08-01 | End: 2018-05-16

## 2017-08-29 RX ORDER — AMLODIPINE BESYLATE 10 MG/1
TABLET ORAL
Qty: 30 TABLET | Refills: 6 | Status: SHIPPED | OUTPATIENT
Start: 2017-08-29 | End: 2018-03-21 | Stop reason: SDUPTHER

## 2017-09-07 ENCOUNTER — LAB (OUTPATIENT)
Dept: ONCOLOGY | Facility: HOSPITAL | Age: 62
End: 2017-09-07

## 2017-09-07 ENCOUNTER — OFFICE VISIT (OUTPATIENT)
Dept: ONCOLOGY | Facility: CLINIC | Age: 62
End: 2017-09-07

## 2017-09-07 VITALS
WEIGHT: 172.3 LBS | RESPIRATION RATE: 16 BRPM | DIASTOLIC BLOOD PRESSURE: 81 MMHG | BODY MASS INDEX: 24.67 KG/M2 | SYSTOLIC BLOOD PRESSURE: 156 MMHG | HEIGHT: 70 IN | HEART RATE: 77 BPM | TEMPERATURE: 98.3 F

## 2017-09-07 VITALS — HEART RATE: 71 BPM | SYSTOLIC BLOOD PRESSURE: 173 MMHG | DIASTOLIC BLOOD PRESSURE: 85 MMHG

## 2017-09-07 DIAGNOSIS — D50.0 IRON DEFICIENCY ANEMIA DUE TO CHRONIC BLOOD LOSS: Primary | ICD-10-CM

## 2017-09-07 DIAGNOSIS — D50.0 IRON DEFICIENCY ANEMIA DUE TO CHRONIC BLOOD LOSS: ICD-10-CM

## 2017-09-07 DIAGNOSIS — C34.91 MALIGNANT NEOPLASM OF RIGHT LUNG, UNSPECIFIED PART OF LUNG (HCC): ICD-10-CM

## 2017-09-07 LAB
ALBUMIN SERPL-MCNC: 4.2 G/DL (ref 3.4–4.8)
ALBUMIN/GLOB SERPL: 1.4 G/DL (ref 1.1–1.8)
ALP SERPL-CCNC: 91 U/L (ref 38–126)
ALT SERPL W P-5'-P-CCNC: 27 U/L (ref 21–72)
ANION GAP SERPL CALCULATED.3IONS-SCNC: 11 MMOL/L (ref 5–15)
AST SERPL-CCNC: 15 U/L (ref 17–59)
BASOPHILS # BLD AUTO: 0.06 10*3/MM3 (ref 0–0.2)
BASOPHILS NFR BLD AUTO: 0.6 % (ref 0–2)
BILIRUB SERPL-MCNC: 0.5 MG/DL (ref 0.2–1.3)
BUN BLD-MCNC: 12 MG/DL (ref 7–21)
BUN/CREAT SERPL: 11.1 (ref 7–25)
CALCIUM SPEC-SCNC: 9 MG/DL (ref 8.4–10.2)
CHLORIDE SERPL-SCNC: 100 MMOL/L (ref 95–110)
CO2 SERPL-SCNC: 26 MMOL/L (ref 22–31)
CREAT BLD-MCNC: 1.08 MG/DL (ref 0.7–1.3)
DEPRECATED RDW RBC AUTO: 47.1 FL (ref 35.1–43.9)
EOSINOPHIL # BLD AUTO: 0.61 10*3/MM3 (ref 0–0.7)
EOSINOPHIL NFR BLD AUTO: 6.5 % (ref 0–7)
ERYTHROCYTE [DISTWIDTH] IN BLOOD BY AUTOMATED COUNT: 14.5 % (ref 11.5–14.5)
FERRITIN SERPL-MCNC: 110 NG/ML (ref 17.9–464)
GFR SERPL CREATININE-BSD FRML MDRD: 69 ML/MIN/1.73 (ref 60–113)
GLOBULIN UR ELPH-MCNC: 2.9 GM/DL (ref 2.3–3.5)
GLUCOSE BLD-MCNC: 100 MG/DL (ref 60–100)
HCT VFR BLD AUTO: 39.2 % (ref 39–49)
HGB BLD-MCNC: 12.8 G/DL (ref 13.7–17.3)
IMM GRANULOCYTES # BLD: 0.03 10*3/MM3 (ref 0–0.02)
IMM GRANULOCYTES NFR BLD: 0.3 % (ref 0–0.5)
IRON 24H UR-MRATE: 44 MCG/DL (ref 49–181)
IRON SATN MFR SERPL: 14 % (ref 20–55)
LYMPHOCYTES # BLD AUTO: 1.53 10*3/MM3 (ref 0.6–4.2)
LYMPHOCYTES NFR BLD AUTO: 16.4 % (ref 10–50)
MCH RBC QN AUTO: 29 PG (ref 26.5–34)
MCHC RBC AUTO-ENTMCNC: 32.7 G/DL (ref 31.5–36.3)
MCV RBC AUTO: 88.7 FL (ref 80–98)
MONOCYTES # BLD AUTO: 0.45 10*3/MM3 (ref 0–0.9)
MONOCYTES NFR BLD AUTO: 4.8 % (ref 0–12)
NEUTROPHILS # BLD AUTO: 6.64 10*3/MM3 (ref 2–8.6)
NEUTROPHILS NFR BLD AUTO: 71.4 % (ref 37–80)
PLATELET # BLD AUTO: 258 10*3/MM3 (ref 150–450)
PMV BLD AUTO: 12 FL (ref 8–12)
POTASSIUM BLD-SCNC: 3.9 MMOL/L (ref 3.5–5.1)
PROT SERPL-MCNC: 7.1 G/DL (ref 6.3–8.6)
RBC # BLD AUTO: 4.42 10*6/MM3 (ref 4.37–5.74)
SODIUM BLD-SCNC: 137 MMOL/L (ref 137–145)
TIBC SERPL-MCNC: 319 MCG/DL (ref 261–462)
WBC NRBC COR # BLD: 9.32 10*3/MM3 (ref 3.2–9.8)

## 2017-09-07 PROCEDURE — 80053 COMPREHEN METABOLIC PANEL: CPT | Performed by: INTERNAL MEDICINE

## 2017-09-07 PROCEDURE — 99214 OFFICE O/P EST MOD 30 MIN: CPT | Performed by: INTERNAL MEDICINE

## 2017-09-07 PROCEDURE — 83550 IRON BINDING TEST: CPT | Performed by: INTERNAL MEDICINE

## 2017-09-07 PROCEDURE — 83540 ASSAY OF IRON: CPT | Performed by: INTERNAL MEDICINE

## 2017-09-07 PROCEDURE — G0463 HOSPITAL OUTPT CLINIC VISIT: HCPCS | Performed by: INTERNAL MEDICINE

## 2017-09-07 PROCEDURE — 82728 ASSAY OF FERRITIN: CPT | Performed by: INTERNAL MEDICINE

## 2017-09-07 PROCEDURE — 85025 COMPLETE CBC W/AUTO DIFF WBC: CPT | Performed by: INTERNAL MEDICINE

## 2017-09-07 PROCEDURE — 36415 COLL VENOUS BLD VENIPUNCTURE: CPT | Performed by: INTERNAL MEDICINE

## 2017-09-07 NOTE — PROGRESS NOTES
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  Patient underwent a surgery for squamous cell cancer of the right lung which was found to be stage IB in July 2016.  Patient is here for follow-up visit today.  Denies any worsening fatigue.  Denies any fever or chills or any recent weight loss.  Denies any abnormal swollen glands anywhere in the body.    PAST MEDICAL HISTORY:    Past Medical History:   Diagnosis Date   • Allergic rhinitis    • Atherosclerosis of native arteries of the extremities with ulceration     Bilateral legs   • Benign hypertension    • Carotid artery stenosis    • Coagulopathy     on plavix and asa (instructed to continue per md)   • Coronary atherosclerosis    • Encounter for surgical aftercare following surgery of respiratory system     Right thoracotomy with Carinal Pneumonectomy, Thoracic & Mediastinal Lymphadenetomy, Fiberoptic Bronchoscopy. 7/13/16      • Essential hypertension    • Hypercholesterolemia    • Inguinal pain     small right groin seroma   • Malignant neoplasm of lung     Right upper lobe mass suspicious for lung cancer   • JUAN on CPAP    • PVD (peripheral vascular disease)    • Renal artery stenosis    • Simple chronic bronchitis    • Sleep apnea    • SOB (shortness of breath)    • Squamous cell carcinoma of lung    • Surgical follow-up care        SOCIAL HISTORY:    Social History   Substance Use Topics   • Smoking status: Former Smoker     Years: 35.00   • Smokeless tobacco: Never Used   • Alcohol use No       Surgical History :  Past Surgical History:   Procedure Laterality Date   • CARDIAC CATHETERIZATION  07/12/2011    Multi-vessel coronary artery disease with critical lesion noted  in the LAD coronary artery, left circumflex coronary artery and right coronary artery as described above. The LAD appeared to be chronically occluded    • CAROTID ENDARTERECTOMY  12/03/2013    Right carotid endarterectomy. Carotid cerebral arteriogram   • CORONARY ARTERY BYPASS GRAFT  07/14/2011    LIMA->LAD SVG->OM2 SVG->OM3 SVG->steve branch   • ESOPHAGOSCOPY / EGD  12/06/2011    With tube-Normal hypopharynx, GE junction, Duodenum, symmetrical & patent pylorus. Tongue of columnar epithelium that could be a Ivy's esophagus present. Multiple biopsies performed. Chronic gastritis in antrum. Biopsy taken   • HIP SURGERY  01/18/2016    Right total hip arthroplasty anterior approach.   • LUNG REMOVAL, TOTAL  07/13/2016    Right thoracotomy with Carinal Pneumonectomy, Thoracic & Mediastinal Lymphadenetomy, Fiberoptic Bronchoscopy   • GA THROMBOENDARTECTMY NECK,NECK INCIS Left 3/14/2017    Procedure: CAROTID ENDARTERECTOMY, ARTERIOGRAM ;  Surgeon: Brayden Ray MD;  Location: [facility];  Service: Vascular   • THORACOTOMY  07/13/2016    Right thoracotomy with carinal pneumonectomy.Thoracic and mediastinal lymphadenectomy.Fiberoptic bronchoscopy   • TRANSESOPHAGEAL ECHOCARDIOGRAM (LEIDA)  07/12/2011    Without color flow-LV dysfunction with an EF of 40-45% with hypokinesis/akinesis of the distal septum and apex. Mild left atrial enlargement. No significant valvular abnormalities noted. No pericardial effusion noted       ALLERGIES:    Allergies   Allergen Reactions   • Penicillins Itching     Rash,itching       REVIEW OF SYSTEMS:      CONSTITUTIONAL:  No fever, chills, or night sweats.     HEENT:  No epistaxis, mouth sores, or difficulty swallowing.    RESPIRATORY:  No new shortness of breath or cough at present.    CARDIOVASCULAR:  No chest pain or palpitations.    GASTROINTESTINAL:  No abdominal pain, nausea, vomiting, or blood in the stool.    GENITOURINARY:  No dysuria or hematuria.    MUSCULOSKELETAL:  No any  "new back pain or arthralgias.     NEUROLOGICAL:  No tingling or numbness. No new headache or dizziness.     LYMPHATICS:  Denies any abnormal swollen and anywhere in the body.    SKIN:  Denies any new skin rash.        PHYSICAL EXAMINATION:      VITAL SIGNS:    /81  Pulse 77  Temp 98.3 °F (36.8 °C) (Temporal Artery )   Resp 16  Ht 70\" (177.8 cm)  Wt 172 lb 4.8 oz (78.2 kg)  BMI 24.72 kg/m2    GENERAL:  Not in any distress.    HEENT:  Normocephalic, Atraumatic.No Conjunctival pallor. No icterus. Extraocular Movements Intact. No Fascial Asymmetry noted.    NECK:  No adenopathy. No JVD.    RESPIRATORY:  Fair air entry bilateral. No rhonchi or wheezing.    CARDIOVASCULAR:  S1, S2. Regular rate and rhythm. No murmur or gallop appreciated.    ABDOMEN:  Soft, nontender. Bowel sounds present in all four quadrants.  No organomegaly appreciated.    EXTREMITIES:  No edema.No Calf Tenderness.    NEUROLOGIC:  Alert, awake and oriented ×3.  No  Motor or sensory deficit appreciated. Cranial Nerves 2-12 grossly intact.        DIAGNOSTIC DATA:    Glucose   Date Value Ref Range Status   09/07/2017 100 60 - 100 mg/dL Final     Sodium   Date Value Ref Range Status   09/07/2017 137 137 - 145 mmol/L Final     Potassium   Date Value Ref Range Status   09/07/2017 3.9 3.5 - 5.1 mmol/L Final     CO2   Date Value Ref Range Status   09/07/2017 26.0 22.0 - 31.0 mmol/L Final     Chloride   Date Value Ref Range Status   09/07/2017 100 95 - 110 mmol/L Final     Anion Gap   Date Value Ref Range Status   09/07/2017 11.0 5.0 - 15.0 mmol/L Final     Creatinine   Date Value Ref Range Status   09/07/2017 1.08 0.70 - 1.30 mg/dL Final     BUN   Date Value Ref Range Status   09/07/2017 12 7 - 21 mg/dL Final     BUN/Creatinine Ratio   Date Value Ref Range Status   09/07/2017 11.1 7.0 - 25.0 Final     Calcium   Date Value Ref Range Status   09/07/2017 9.0 8.4 - 10.2 mg/dL Final     eGFR Non  Amer   Date Value Ref Range Status   09/07/2017 " 69 >60 mL/min/1.73 Final     Alkaline Phosphatase   Date Value Ref Range Status   09/07/2017 91 38 - 126 U/L Final     Total Protein   Date Value Ref Range Status   09/07/2017 7.1 6.3 - 8.6 g/dL Final     ALT (SGPT)   Date Value Ref Range Status   09/07/2017 27 21 - 72 U/L Final     AST (SGOT)   Date Value Ref Range Status   09/07/2017 15 (L) 17 - 59 U/L Final     Total Bilirubin   Date Value Ref Range Status   09/07/2017 0.5 0.2 - 1.3 mg/dL Final     Albumin   Date Value Ref Range Status   09/07/2017 4.20 3.40 - 4.80 g/dL Final     Globulin   Date Value Ref Range Status   09/07/2017 2.9 2.3 - 3.5 gm/dL Final     A/G Ratio   Date Value Ref Range Status   09/07/2017 1.4 1.1 - 1.8 g/dL Final     Lab Results   Component Value Date    WBC 9.32 09/07/2017    HGB 12.8 (L) 09/07/2017    HCT 39.2 09/07/2017    MCV 88.7 09/07/2017     09/07/2017     Lab Results   Component Value Date    NEUTROABS 6.64 09/07/2017    IRON 44 (L) 09/07/2017    TIBC 319 09/07/2017    LABIRON 14 (L) 09/07/2017    FERRITIN 110.00 09/07/2017    [id number] 340 06/01/2017    FOLATE 15.50 06/01/2017         RADIOLOGY DATA :  CT chest with contrast done on February 22, 2017 shows:  IMPRESSION:  CONCLUSION:   1. Interval enlargement of a single right paratracheal lymph node  that has macroscopic fat and is somewhat favored to be benign but  the increase in size raises question of developing metastatic  disease. Consider follow-up PET/CT or at least short-term  follow-up CT.  2. Interval right pneumonectomy with otherwise no evidence to  suggest recurrent or metastatic disease in the chest      ASSESSMENT AND PLAN:      1.  Anemia: Anemia workup in the past as shown some multifactorial with iron deficiency, borderline vitamin B12 and folate level.  Patient is status post intravenous Feraheme last dose was on December 29, 2016.  Daniel is 12.8 today with adequate amount of iron.  At this point recommend continuing with ferrous sulfate 1 tablet daily  along with folic acid 1 mg by mouth daily and vitamin B12 thousand micrograms by mouth daily.  We will see him back in about 4 months with repeat anemia workup to be done prior to that.    2.  Stage IB squamous cell cancer of right lung, status post resection in July 2016.  At that point patient was offered adjuvant chemotherapy by Dr. Randolph but he refused.  Recently done CT of chest on February 22, 2017 did show right paratracheal lymph node increasing in size from 1.5-2 cm. He is being followed by Dr. Ray with surveillance scans as required.  He has a CT scan scheduled in November 2017.    3.  Coronary artery disease    4.  History of GI bleed due to AVMs in the duodenum.    5.  Health maintenance: Patient does not smoke.  Had a colonoscopy done in 2016.  He remains full code.    6.  Prescriptions: Patient is enough prescription for vitamin B12 and folic acid for now.      Fly Bustamante MD  9/7/2017  4:26 PM

## 2017-09-08 DIAGNOSIS — I73.9 PVD (PERIPHERAL VASCULAR DISEASE) (HCC): ICD-10-CM

## 2017-09-11 RX ORDER — CLOPIDOGREL BISULFATE 75 MG/1
TABLET ORAL
Qty: 30 TABLET | Refills: 11 | Status: SHIPPED | OUTPATIENT
Start: 2017-09-11 | End: 2018-09-08 | Stop reason: SDUPTHER

## 2017-09-19 ENCOUNTER — OFFICE VISIT (OUTPATIENT)
Dept: SLEEP MEDICINE | Facility: HOSPITAL | Age: 62
End: 2017-09-19

## 2017-09-19 VITALS
WEIGHT: 175 LBS | BODY MASS INDEX: 25.05 KG/M2 | HEART RATE: 78 BPM | HEIGHT: 70 IN | OXYGEN SATURATION: 97 % | SYSTOLIC BLOOD PRESSURE: 110 MMHG | DIASTOLIC BLOOD PRESSURE: 50 MMHG

## 2017-09-19 DIAGNOSIS — G47.33 OBSTRUCTIVE SLEEP APNEA, ADULT: Primary | ICD-10-CM

## 2017-09-19 PROCEDURE — 99213 OFFICE O/P EST LOW 20 MIN: CPT | Performed by: INTERNAL MEDICINE

## 2017-09-19 NOTE — PROGRESS NOTES
Sleep Clinic Follow Up    Date: 9/19/2017  Primary Care Physician: Preston Dalton MD      Interim History (1/3):  Since the last visit on 09/15/2016, patient has:      1)  JUAN - Has remained compliant with CPAP. He denies receiving no benefit from PAP therapy, mask and machine issues, dry mouth, headaches, pressures intolerance, or non-compliance. He denies abnormal dreams, sleep paralysis, hypnagogic hallucinations, or cataplexy symptoms.     PAP Data:  Time frame: 09/15/2016 - 09/18/2017   Compliance 100 %  PAP range : 12 cm H2O  Average 90% pressure: 12 cmH2O  Leak: 37.5 minutes   Average AHI 1.3 events/hr  Mask type: FFM  DME: BG    Bed time: 0506-0251  Sleep latency: 5minutes  Number of times awakens during the night: 5-7  Wake time: 0445  Estimated total sleep time at night: 8 hours  Caffeine intake: 2-3 coffee, and 1/2 gallon of tea  Alcohol intake: none  Nap time: none    PMHx, FH, SH reviewed and pertinent changes are:  unchanged from last office visit on 09/15/2016      REVIEW OF SYSTEMS:   Negative for chest pain, fever, chills, SOA, abdominal pain. Smoking: none      Exam (6-11/12):    Vitals:    09/19/17 1553   BP: 110/50   Pulse: 78   SpO2: 97%     Body mass index is 25.11 kg/(m^2).  Gen:  No distress, conversant, pleasant, appears stated age, alert, oriented  Eyes:   Anicteric sclera, moist conjunctiva, no lid lag     PERRLA, EOMI   Heent:   NC/AT    Oropharynx clear, Mallampati 4      Lungs:  Normal effort, non-labored breathing    Clear to auscultation but diminished on the left, absent on the right (pneumonectomy in 07/2016)  CV:  Normal S1/S2, without murmur    no lower extremity edema  ABD:  Soft, normal bowel sounds    Skin:  Normal tone, texture and turgor    Psych:  Appropriate affect  Neuro:  CN 2-12 intact        Past Medical History:   Diagnosis Date   • Allergic rhinitis    • Atherosclerosis of native arteries of the extremities with ulceration     Bilateral legs   • Benign  hypertension    • Carotid artery stenosis    • Coagulopathy     on plavix and asa (instructed to continue per md)   • Coronary atherosclerosis    • Encounter for surgical aftercare following surgery of respiratory system     Right thoracotomy with Carinal Pneumonectomy, Thoracic & Mediastinal Lymphadenetomy, Fiberoptic Bronchoscopy. 7/13/16      • Essential hypertension    • Hypercholesterolemia    • Inguinal pain     small right groin seroma   • Malignant neoplasm of lung     Right upper lobe mass suspicious for lung cancer   • JUAN on CPAP    • PVD (peripheral vascular disease)    • Renal artery stenosis    • Simple chronic bronchitis    • Sleep apnea    • SOB (shortness of breath)    • Squamous cell carcinoma of lung    • Surgical follow-up care        Current Outpatient Prescriptions:   •  albuterol (PROVENTIL HFA;VENTOLIN HFA) 108 (90 BASE) MCG/ACT inhaler, Inhale 2 puffs every 4 (four) hours as needed for wheezing., Disp: , Rfl:   •  amLODIPine (NORVASC) 10 MG tablet, TAKE ONE TABLET BY MOUTH DAILY, Disp: 30 tablet, Rfl: 6  •  aspirin 81 MG EC tablet, Take 81 mg by mouth Daily. Instructed to cont taking, Disp: , Rfl:   •  clopidogrel (PLAVIX) 75 MG tablet, TAKE ONE TABLET BY MOUTH DAILY, Disp: 30 tablet, Rfl: 11  •  famotidine (PEPCID) 40 MG tablet, Take 40 mg by mouth 2 (Two) Times a Day., Disp: , Rfl:   •  ferrous sulfate 325 (65 FE) MG tablet, Take 325 mg by mouth Every Night., Disp: , Rfl:   •  folic acid (FOLVITE) 1 MG tablet, Take 1 mg by mouth Every Night., Disp: , Rfl:   •  furosemide (LASIX) 40 MG tablet, TAKE ONE-HALF TABLET BY MOUTH DAILY, Disp: 30 tablet, Rfl: 2  •  labetalol (NORMODYNE) 200 MG tablet, TAKE ONE TABLET BY MOUTH TWICE A DAY, Disp: 60 tablet, Rfl: 4  •  lidocaine (LIDODERM) 5 %, Place 1 patch on the skin Daily. Remove & Discard patch within 12 hours or as directed by MD, Disp: 30 patch, Rfl: 0  •  rosuvastatin (CRESTOR) 10 MG tablet, Take 10 mg by mouth Every Night., Disp: , Rfl:   •   vitamin B-12 (CYANOCOBALAMIN) 100 MCG tablet, Take 50 mcg by mouth Daily., Disp: , Rfl:   •  vitamin B-12 (CYANOCOBALAMIN) 500 MCG tablet, TAKE ONE TABLET BY MOUTH DAILY, Disp: 90 tablet, Rfl: 1      ASSESSMENT:     1. Obstructive sleep apnea (PSG on ?, AHI of ?, on CPAP of 12 cm H2O), currently on 12  2. Caffeine excess - recommend reducing caffeine intake over time to no more than (3) caffeine beverages per day, all before 4pm.      PLAN:  1. Continue CPAP as prescribed.   2. Script for CPAP supplies  3. Return to clinic in 1 year with compliance check unless sx change in the interim period.       This document has been electronically signed by Jorge Craig MD on September 19, 2017         CC: Preston Dalton MD          No ref. provider found

## 2017-10-02 RX ORDER — FUROSEMIDE 40 MG/1
TABLET ORAL
Qty: 30 TABLET | Refills: 3 | Status: SHIPPED | OUTPATIENT
Start: 2017-10-02 | End: 2018-05-30 | Stop reason: SDUPTHER

## 2017-10-03 DIAGNOSIS — C34.91 PRIMARY LUNG CANCER WITH METASTASIS FROM LUNG TO OTHER SITE, RIGHT (HCC): Primary | ICD-10-CM

## 2017-10-03 DIAGNOSIS — C34.91 MALIGNANT NEOPLASM OF RIGHT LUNG, UNSPECIFIED PART OF LUNG (HCC): ICD-10-CM

## 2017-10-03 RX ORDER — FOLIC ACID 1 MG/1
TABLET ORAL
Qty: 90 TABLET | Refills: 3 | Status: SHIPPED | OUTPATIENT
Start: 2017-10-03 | End: 2018-09-18 | Stop reason: ALTCHOICE

## 2017-10-18 ENCOUNTER — HOSPITAL ENCOUNTER (OUTPATIENT)
Dept: CT IMAGING | Facility: HOSPITAL | Age: 62
Discharge: HOME | End: 2017-10-18
Admitting: THORACIC SURGERY (CARDIOTHORACIC VASCULAR SURGERY)

## 2017-10-18 PROCEDURE — 0 IOPAMIDOL 61 % SOLUTION: Performed by: THORACIC SURGERY (CARDIOTHORACIC VASCULAR SURGERY)

## 2017-10-18 PROCEDURE — 71260 CT THORAX DX C+: CPT

## 2017-10-18 RX ADMIN — IOPAMIDOL 90 ML: 612 INJECTION, SOLUTION INTRAVENOUS at 09:15

## 2017-11-07 ENCOUNTER — OFFICE VISIT (OUTPATIENT)
Dept: CARDIAC SURGERY | Facility: CLINIC | Age: 62
End: 2017-11-07

## 2017-11-07 VITALS
HEIGHT: 70 IN | OXYGEN SATURATION: 86 % | DIASTOLIC BLOOD PRESSURE: 68 MMHG | SYSTOLIC BLOOD PRESSURE: 100 MMHG | WEIGHT: 173 LBS | BODY MASS INDEX: 24.77 KG/M2 | TEMPERATURE: 98.2 F | HEART RATE: 53 BPM

## 2017-11-07 DIAGNOSIS — I65.23 BILATERAL CAROTID ARTERY STENOSIS: Primary | ICD-10-CM

## 2017-11-07 PROCEDURE — 99214 OFFICE O/P EST MOD 30 MIN: CPT | Performed by: NURSE PRACTITIONER

## 2017-11-07 NOTE — PATIENT INSTRUCTIONS
Carotid Duplex:  Right and left 0-49 % no increase velocities   Repeat in 6 mths unless  If you should experience any neurological symptoms including but not limited to visual or speech disturbances confusion, seizures, or weakness of limbs of one side of your body notify [facility] V[facility] center immediately for evaluation or if after hours present to the nearest Emergency Department.   CT results per Dr Ray   Intolerant of statin   Vit D reduces inflammation as well

## 2017-11-17 ENCOUNTER — OFFICE VISIT (OUTPATIENT)
Dept: CARDIOLOGY | Facility: CLINIC | Age: 62
End: 2017-11-17

## 2017-11-17 VITALS
SYSTOLIC BLOOD PRESSURE: 116 MMHG | BODY MASS INDEX: 24.91 KG/M2 | HEART RATE: 68 BPM | HEIGHT: 70 IN | DIASTOLIC BLOOD PRESSURE: 58 MMHG | WEIGHT: 174 LBS

## 2017-11-17 DIAGNOSIS — I10 ESSENTIAL HYPERTENSION: ICD-10-CM

## 2017-11-17 DIAGNOSIS — E78.00 HYPERCHOLESTEROLEMIA: ICD-10-CM

## 2017-11-17 DIAGNOSIS — I25.810 ATHEROSCLEROSIS OF CORONARY ARTERY BYPASS GRAFT OF NATIVE HEART WITHOUT ANGINA PECTORIS: Primary | ICD-10-CM

## 2017-11-17 DIAGNOSIS — I10 BENIGN HYPERTENSION: ICD-10-CM

## 2017-11-17 DIAGNOSIS — Z95.1 S/P CABG (CORONARY ARTERY BYPASS GRAFT): ICD-10-CM

## 2017-11-17 PROCEDURE — 99213 OFFICE O/P EST LOW 20 MIN: CPT | Performed by: INTERNAL MEDICINE

## 2017-11-17 NOTE — PROGRESS NOTES
Juanito Pruitt  62 y.o. male    11/17/2017  1. 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 Benign hypertension    3. Essential hypertension    4. Hypercholesterolemia    5. S/P CABG (coronary artery bypass graft)        History of Present Illness     is here for follow-up of his above stated problems.  He from a cardiac standpoint is done well and denied any chest pain, shortness of breath, palpitation, dizziness or syncope.  He follows up with Dr. Bustamante on a regular basis for follow-up of his lung carcinoma/anemia.  No signs of angina or congestive heart failure was noted.  His blood pressure was in the normal range.        SUBJECTIVE    Allergies   Allergen Reactions   • Penicillins Itching     Rash,itching         Past Medical History:   Diagnosis Date   • Allergic rhinitis    • Atherosclerosis of native arteries of the extremities with ulceration     Bilateral legs   • Benign hypertension    • Carotid artery stenosis    • Coagulopathy     on plavix and asa (instructed to continue per md)   • Coronary atherosclerosis    • Encounter for surgical aftercare following surgery of respiratory system     Right thoracotomy with Carinal Pneumonectomy, Thoracic & Mediastinal Lymphadenetomy, Fiberoptic Bronchoscopy. 7/13/16      • Essential hypertension    • Hypercholesterolemia    • Inguinal pain     small right groin seroma   • Malignant neoplasm of lung     Right upper lobe mass suspicious for lung cancer   • JUAN on CPAP    • PVD (peripheral vascular disease)    • Renal artery stenosis    • Simple chronic bronchitis    • Sleep apnea    • SOB (shortness of breath)    • Squamous cell carcinoma of lung    • Surgical follow-up care          Past Surgical History:   Procedure Laterality Date   • CARDIAC CATHETERIZATION  07/12/2011    Multi-vessel coronary artery disease with critical lesion noted in the LAD coronary artery, left circumflex coronary artery and right  coronary artery as described above. The LAD appeared to be chronically occluded    • CAROTID ENDARTERECTOMY  12/03/2013    Right carotid endarterectomy. Carotid cerebral arteriogram   • CORONARY ARTERY BYPASS GRAFT  07/14/2011    LIMA->LAD SVG->OM2 SVG->OM3 SVG->steve branch   • ESOPHAGOSCOPY / EGD  12/06/2011    With tube-Normal hypopharynx, GE junction, Duodenum, symmetrical & patent pylorus. Tongue of columnar epithelium that could be a Ivy's esophagus present. Multiple biopsies performed. Chronic gastritis in antrum. Biopsy taken   • HIP SURGERY  01/18/2016    Right total hip arthroplasty anterior approach.   • LUNG REMOVAL, TOTAL  07/13/2016    Right thoracotomy with Carinal Pneumonectomy, Thoracic & Mediastinal Lymphadenetomy, Fiberoptic Bronchoscopy   • MA THROMBOENDARTECTMY NECK,NECK INCIS Left 3/14/2017    Procedure: CAROTID ENDARTERECTOMY, ARTERIOGRAM ;  Surgeon: Brayden Ray MD;  Location: [facility];  Service: Vascular   • THORACOTOMY  07/13/2016    Right thoracotomy with carinal pneumonectomy.Thoracic and mediastinal lymphadenectomy.Fiberoptic bronchoscopy   • TRANSESOPHAGEAL ECHOCARDIOGRAM (LEIDA)  07/12/2011    Without color flow-LV dysfunction with an EF of 40-45% with hypokinesis/akinesis of the distal septum and apex. Mild left atrial enlargement. No significant valvular abnormalities noted. No pericardial effusion noted         Family History   Problem Relation Age of Onset   • Heart disease Mother    • Other Mother      ischemic heart disease   • Hypertension Mother    • Heart disease Father    • Other Father      ischemic heart disease   • Hypertension Father    • Coronary artery disease Other    • Cancer Sister          Social History     Social History   • Marital status:      Spouse name: N/A   • Number of children: N/A   • Years of education: N/A     Occupational History   • Not on file.     Social History Main Topics   • Smoking status: Former Smoker     Years: 35.00   •  "Smokeless tobacco: Never Used   • Alcohol use No   • Drug use: No   • Sexual activity: Defer     Other Topics Concern   • Not on file     Social History Narrative         Current Outpatient Prescriptions   Medication Sig Dispense Refill   • albuterol (PROVENTIL HFA;VENTOLIN HFA) 108 (90 BASE) MCG/ACT inhaler Inhale 2 puffs every 4 (four) hours as needed for wheezing.     • amLODIPine (NORVASC) 10 MG tablet TAKE ONE TABLET BY MOUTH DAILY 30 tablet 6   • aspirin 81 MG EC tablet Take 81 mg by mouth Daily. Instructed to cont taking     • clopidogrel (PLAVIX) 75 MG tablet TAKE ONE TABLET BY MOUTH DAILY 30 tablet 11   • famotidine (PEPCID) 40 MG tablet Take 40 mg by mouth 2 (Two) Times a Day.     • ferrous sulfate 325 (65 FE) MG tablet Take 325 mg by mouth Every Night.     • folic acid (FOLVITE) 1 MG tablet TAKE ONE TABLET BY MOUTH DAILY 90 tablet 3   • furosemide (LASIX) 40 MG tablet TAKE ONE-HALF TABLET BY MOUTH DAILY 30 tablet 3   • labetalol (NORMODYNE) 200 MG tablet TAKE ONE TABLET BY MOUTH TWICE A DAY 60 tablet 4   • lidocaine (LIDODERM) 5 % Place 1 patch on the skin Daily. Remove & Discard patch within 12 hours or as directed by MD 30 patch 0   • vitamin B-12 (CYANOCOBALAMIN) 500 MCG tablet TAKE ONE TABLET BY MOUTH DAILY 90 tablet 1     No current facility-administered medications for this visit.          OBJECTIVE    /58  Pulse 68  Ht 70\" (177.8 cm)  Wt 174 lb (78.9 kg)  BMI 24.97 kg/m2        Review of Systems     Constitutional:  Denies recent weight loss, weight gain, fever or chills     HENT:  Denies any hearing loss, epistaxis, hoarseness, or difficulty speaking.     Eyes: Wears eyeglasses or contact lenses     Respiratory:  Denies dyspnea with exertion,no cough, wheezing, or hemoptysis.     Cardiovascular: Negative for palpations, chest pain, orthopnea, PND    Gastrointestinal:  Denies change in bowel habits, dyspepsia, ulcer disease, hematochezia, or melena.     Endocrine: Negative for cold " intolerance, heat intolerance, polydipsia, polyphagia and polyuria.     Genitourinary: Negative.      Musculoskeletal: Denies any history of arthritic symptoms or back problems     Skin:  Denies any change in hair or nails, rashes, or skin lesions.     Allergic/Immunologic: Negative.  Negative for environmental allergies, food allergies and immunocompromised state.     Neurological:  Denies any history of recurrent headaches, strokes, TIA, or seizure disorder.     Hematological: h/o Anemia    Psychiatric/Behavioral: Denies any history of depression, substance abuse, or change in cognitive function.         Physical Exam     Constitutional: Cooperative, alert and oriented, well-developed,  in no acute distress.     HENT:   Head: Normocephalic, normal hair patterns, no masses or tenderness.  Ears, Nose, and Throat: No gross abnormalities. No pallor or cyanosis.  Eyes: EOMS intact, PERRL, conjunctivae and lids unremarkable. Fundoscopic exam and visual fields not performed.   Neck: No palpable masses or adenopathy, no thyromegaly, no JVD, right carotid endarterectomy scar  Cardiovascular: Regular rhythm, S1 and S2 normal, no S3 or S4. No murmurs, gallops, or rubs detected.     Pulmonary/Chest: Chest: normal symmetry, no tenderness to palpation, normal respiratory excursion,  no use of accessory muscles.            Pulmonary: Normal breath sounds. No rales or ronchi.    Abdominal: Abdomen soft, bowel sounds normoactive, no masses, no hepatosplenomegaly, non-tender, no bruits.     Musculoskeletal: No deformities, clubbing, cyanosis, erythema, or edema observed. There are no spinal abnormalities noted.    Neurological: No gross motor or sensory deficits noted, affect appropriate, oriented to time, person, place.     Skin: Warm and dry to the touch, no apparent skin lesions or masses noted.     Psychiatric: He has a normal mood and affect. His behavior is normal. Judgment and thought content normal.          Procedures      Lab Results   Component Value Date    WBC 9.32 09/07/2017    HGB 12.8 (L) 09/07/2017    HCT 39.2 09/07/2017    MCV 88.7 09/07/2017     09/07/2017     Lab Results   Component Value Date    GLUCOSE 100 09/07/2017    BUN 12 09/07/2017    CREATININE 1.08 09/07/2017    EGFRIFNONA 69 09/07/2017    BCR 11.1 09/07/2017    CO2 26.0 09/07/2017    CALCIUM 9.0 09/07/2017    ALBUMIN 4.20 09/07/2017    LABIL2 1.4 09/07/2017    AST 15 (L) 09/07/2017    ALT 27 09/07/2017     No results found for: CHOL  No results found for: TRIG  No results found for: HDL  No results found for: LDLCALC  No results found for: LDL  No results found for: HDLLDLRATIO  No components found for: CHOLHDL  No results found for: HGBA1C  No results found for: TSH, Q5BRAKU, R2PVLXW, THYROIDAB        ASSESSMENT AND PLAN    Mr. Pruitt is stable with regards to his heart with no evidence of angina, arrhythmia or congestive heart failure.  Antiplatelet therapy with aspirin and Plavix, antihypertensive therapy with labetalol and amlodipine and Lasix have been continued.    Juanito was seen today for follow-up.    Diagnoses and all orders for this visit:    Atherosclerosis of coronary artery bypass graft of native heart without angina pectoris  -     Lipid Panel; Future    Benign hypertension  -     Lipid Panel; Future    Essential hypertension  -     Lipid Panel; Future    Hypercholesterolemia  -     Lipid Panel; Future    S/P CABG (coronary artery bypass graft)        Eber Thompson MD  11/17/2017  12:21 PM

## 2017-11-27 RX ORDER — LABETALOL 200 MG/1
TABLET, FILM COATED ORAL
Qty: 60 TABLET | Refills: 11 | Status: SHIPPED | OUTPATIENT
Start: 2017-11-27 | End: 2018-11-26 | Stop reason: SDUPTHER

## 2017-11-28 NOTE — PROGRESS NOTES
Subjective   Patient ID: Juanito Pruitt is a 62 y.o. male is here today for follow-up for carotid stenosis.   CC;   No neurosensory symptoms  Concerned about rib fracture.      Carotid Artery Disease   Associated symptoms include chest pain (Intermittent R rib cage pain with lifting of RUE ). Pertinent negatives include no anorexia, change in bowel habit, chills, coughing, fever, headaches, joint swelling, nausea, numbness or sore throat.     Peripheral Vascular Disease:    PCP:  Dr Dalton  Cardiology:  Dr Thompson  GI:  Dr Moya  Hematology:  Dr Randolph    62yr man with PVD, Carotid Stenosis, CAD, HTN, dyslipidemia, GERD.   RIGHT groin pain resolved.  progressive anemia, workup in progress.  Carotid stenosis requires treatment.  No hemoptysis, cough, wt loss.  Claudication None, No rest pain, No ischemic tissue loss  No amaurosis fugax, No TIA, No stroke  No other associated signs, symptoms or modifying factors.  Is most concern about RRight lateral rib fracture, does cause intermittent pain with lifting of RUE.     12/2011:  bilateral renal artery stents  4/20/2012 JUAN:  RIGHT 1.1 triphasic, LEFT 1.1 triphasic  6/7/2012 Exercise JUAN:  RIGHT .89 to .69, LEFT 1.0 to .87  4/25/2014 US RIGHT groin:  [id number] fluid collection deep to femoral vessels.  5/13/2014 US drainage seroma:  10ml serous fluid, no residual seroma.  5/20/2014 US RIGHT groin:  5x2x0.5cm fluid collection deep to femoral vessels.  7/21/2014 Renal Artery Duplex:  RIGHT maxPSV 222cm/s,RI .87, RAR 2.8.  LEFT maxPSV 271cm/s, RI .88, RAR 3.4.  patent bilateral renal artery stents.    9/12/2012 Carotid Duplex:  ARIC 70-75% antegrade.  LICA <50% antegrade (maxPSV 96cm/s).  9/17/2012 CTA Carotids:  ARIC 60% small ulcerative plaque.  LICA mild irregularity.  10/11/2013 CTA Carotids:  ARIC 85%, LICA mild irregularity  12/3/13 RIGHT CEA   1/13/2014 Carotid Duplex:  ARIC 0-15%  7/21/2014 Carotid Duplex:  ARIC 0-15% antegrade.  LICA 50-79% antegrade (maxPSV  145cm/s).  2015 Carotid Duplex:  ARIC 0-15% antegrade.  LICA 50-79% antegrade (maxPSV 147cm/s).  2016  Carotid Duplex:  ARIC 0-15% antegrade.  LICA 50-79% antegrade ([id number] cm/s, ratio 2.5) .   2016 CTA Carotids:  ARIC no significant disease, LICA 75-80%.  17  LEFT CEA          17  Carotid Duplex:  LICA 0-45% antegrade        17  Carotid Duplex:  ARIC 0-49% maxPSV 91 ratio 1.3 antegrade.  LICA 0-49% % antegrade [id number] cm/s, ratio 1.4     2011 CXR:  LEFT lower lobe atelectasis.  2016 CXR:  RIGHT paratracheal mass, medial segment atelectasis.  2016 PET CT:  RIGHT upper lobe post obstructive atelectasis.  mass obstructing upper lobe bronchus SUV 10.  no significant adenopathy.  liver, adrenals ok.  2016 PFT:  FVC 4.2 (95%), FEV1 1.7 (47%), DLCO 48%  2016 AB.42/41/57/26/90%  2016 Quantitative lung perfusion scan:  RIGHT 31%, LEFT 69% (predicted post pneumonectomy FEV1 1100ml, adequate for resection)  16  Right thoracotomy with Carinal Pneumonectomy, Thoracic & Mediastinal Lymphadenetomy, Fiberoptic Bronchoscopy.    7/15/16  Hospital DC   PATH:  FINAL  RIGHT LUNG:  SQUAMOUS CARCINOMA, MODERATELY DIFFERENTIATED OF       UPPER LOBE BRONCHUS (4.5 CM IN GREATEST DIMENSION).  THE NEOPLASM IS 0.5 CM AWAY FROM THE BRONCHIAL MARGIN       AND FOUR PERIBRONCHIAL LYMPH NODES SHOW NO       EVIDENCE OF MALIGNANCY.  SUBPLEURAL ABSCESS DISTAL TO THE TUMOR.  NO EVIDENCE OF ANGIOLYMPHATIC INVOLVEMENT.  B.    LEVEL 10 LYMPH NODES (2):  NO SIGNIFICANT PATHOLOGIC DIAGNOSIS.  C.    LEVEL 7 LYMPH NODES (3):  NO SIGNIFICANT PATHOLOGIC DIAGNOSIS.  16  Chest xray:  expected postoperative changes following right pneumonectomy. Left lung appears clear. There is a displaced right lateral sixth rib fracture it appears to be acute.   8/29/16  Chest xray:  Old healed left rib fractures noted. Displaced right sixth rib fracture unchanged in appearance. CONCLUSION- No  significant change in near complete opacification of the right hemithorax. Left lung remains clear.   11/15/16  Chest xray:  consistent with postpneumonectomy changes. There is again noted a displaced right lateral sixth rib fracture. The patient is status post median sternotomy. Emphysema is noted in the left lung. The left lung is otherwise clear. CONCLUSION- No acute disease.   2/22/2017 CT Chest:  No new nodules.  Possible RIGHT paratracheal lymph node vs postop change.  Liver adrenal ok.    The following portions of the patient's history were reviewed and updated as appropriate: allergies, current medications, past family history, past medical history, past social history, past surgical history and problem list.   See HPI   Allergies   Allergen Reactions   • Penicillins Itching     Rash,itching         Current Outpatient Prescriptions:   •  albuterol (PROVENTIL HFA;VENTOLIN HFA) 108 (90 BASE) MCG/ACT inhaler, Inhale 2 puffs every 4 (four) hours as needed for wheezing., Disp: , Rfl:   •  amLODIPine (NORVASC) 10 MG tablet, TAKE ONE TABLET BY MOUTH DAILY, Disp: 30 tablet, Rfl: 6  •  aspirin 81 MG EC tablet, Take 81 mg by mouth Daily. Instructed to cont taking, Disp: , Rfl:   •  clopidogrel (PLAVIX) 75 MG tablet, TAKE ONE TABLET BY MOUTH DAILY, Disp: 30 tablet, Rfl: 11  •  famotidine (PEPCID) 40 MG tablet, Take 40 mg by mouth 2 (Two) Times a Day., Disp: , Rfl:   •  ferrous sulfate 325 (65 FE) MG tablet, Take 325 mg by mouth Every Night., Disp: , Rfl:   •  folic acid (FOLVITE) 1 MG tablet, TAKE ONE TABLET BY MOUTH DAILY, Disp: 90 tablet, Rfl: 3  •  furosemide (LASIX) 40 MG tablet, TAKE ONE-HALF TABLET BY MOUTH DAILY, Disp: 30 tablet, Rfl: 3  •  lidocaine (LIDODERM) 5 %, Place 1 patch on the skin Daily. Remove & Discard patch within 12 hours or as directed by MD, Disp: 30 patch, Rfl: 0  •  vitamin B-12 (CYANOCOBALAMIN) 500 MCG tablet, TAKE ONE TABLET BY MOUTH DAILY, Disp: 90 tablet, Rfl: 1  •  labetalol (NORMODYNE)  200 MG tablet, TAKE ONE TABLET BY MOUTH TWICE A DAY, Disp: 60 tablet, Rfl: 11    Review of Systems   Constitution: Negative for chills, decreased appetite, fever and malaise/fatigue.   HENT: Negative for hearing loss, hoarse voice, nosebleeds and sore throat.         No ear rhythmic pulsation     Cardiovascular: Positive for chest pain (Intermittent R rib cage pain with lifting of RUE ). Negative for claudication, cyanosis, dyspnea on exertion, leg swelling, near-syncope and syncope.   Respiratory: Negative for cough, hemoptysis and shortness of breath.    Hematologic/Lymphatic: Does not bruise/bleed easily.   Skin: Negative for color change and poor wound healing.        All incisions well healed    Musculoskeletal: Negative for joint swelling, muscle cramps and muscle weakness.   Gastrointestinal: Negative for anorexia, change in bowel habit, hematemesis, melena and nausea.   Genitourinary: Negative for hematuria.   Neurological: Negative for brief paralysis, difficulty with concentration, dizziness, focal weakness, headaches, light-headedness, numbness, paresthesias and sensory change.   Psychiatric/Behavioral: Negative for altered mental status.   Allergic/Immunologic: Negative for environmental allergies and hives.        Objective   Physical Exam   Constitutional: He is oriented to person, place, and time. He appears well-nourished.   HENT:   Head: Normocephalic.   Mouth/Throat: Oropharynx is clear and moist.   Tongue midline Facial movements symmetrical      Eyes: Conjunctivae and EOM are normal. Pupils are equal, round, and reactive to light.   Neck: Neck supple. No JVD present. No tracheal deviation present.   Cardiovascular: Normal rate, regular rhythm, normal heart sounds and intact distal pulses.    Pulses:       Carotid pulses are 1+ on the right side with bruit, and 1+ on the left side.       Radial pulses are 2+ on the right side, and 2+ on the left side.        Posterior tibial pulses are 2+ on the  right side, and 2+ on the left side.   Pulmonary/Chest: Effort normal and breath sounds normal. No stridor. He exhibits no tenderness.   Tender when lifts arm    Abdominal: Soft. Bowel sounds are normal.   Musculoskeletal: He exhibits no edema or tenderness.   Symmetrical = movements      Neurological: He is alert and oriented to person, place, and time. No cranial nerve deficit.   Skin: Skin is warm and dry. No rash noted. No erythema. No pallor.   Psychiatric: His behavior is normal. Judgment normal.   Nursing note and vitals reviewed.          Assessment/Plan   Independent Review of Radiographic Studies:    11/07/17  Carotid Duplex:  ARIC 0-49% maxPSV 91 ratio 1.3 antegrade.  LICA 0-49% % antegrade [id number] cm/s, ratio 1.4     1. Stenosis of left carotid artery  SP  CEA, no restenosis    Recheck in 6 months, unless symptoms occur  If you should experience any neurological symptoms including but not limited to visual or speech disturbances confusion, seizures, or weakness of limbs of one side of your body notify [facility] and V[facility] center immediately for evaluation or if after hours present to the nearest Emergency Department.   Recommend continue medical management with antiplatelet therapy..  Secondary ACE/BB  Pt is intolerant statins.   Vitamin D has been shown to promote healthy lining of arteries.  Check level and replace as needed.   Recommend healthy life style:   Continue not smoking. Walking total of 45 minutes per day in minimum of 15 minute intervals   Avoid  Going  barefoot.  Non perfumed cream for for dry skin    2.  Rib fracture  Surgical repair is possible as explained by Dr Ray with or without improvement of problem.  At this time, Mr Pruitt wishes not to proceed with intervention.    3.  Malignant neoplasm RIGHT lung SP resection  Dr Ray visited and reviewed results with patient.

## 2018-01-09 ENCOUNTER — LAB (OUTPATIENT)
Dept: ONCOLOGY | Facility: HOSPITAL | Age: 63
End: 2018-01-09

## 2018-01-09 DIAGNOSIS — D50.0 IRON DEFICIENCY ANEMIA DUE TO CHRONIC BLOOD LOSS: ICD-10-CM

## 2018-01-09 DIAGNOSIS — C34.91 MALIGNANT NEOPLASM OF RIGHT LUNG, UNSPECIFIED PART OF LUNG (HCC): ICD-10-CM

## 2018-01-09 LAB
ALBUMIN SERPL-MCNC: 4 G/DL (ref 3.4–4.8)
ALBUMIN/GLOB SERPL: 1.4 G/DL (ref 1.1–1.8)
ALP SERPL-CCNC: 84 U/L (ref 38–126)
ALT SERPL W P-5'-P-CCNC: 26 U/L (ref 21–72)
ANION GAP SERPL CALCULATED.3IONS-SCNC: 10 MMOL/L (ref 5–15)
AST SERPL-CCNC: 17 U/L (ref 17–59)
BASOPHILS # BLD AUTO: 0.07 10*3/MM3 (ref 0–0.2)
BASOPHILS NFR BLD AUTO: 0.6 % (ref 0–2)
BILIRUB SERPL-MCNC: 0.2 MG/DL (ref 0.2–1.3)
BUN BLD-MCNC: 12 MG/DL (ref 7–21)
BUN/CREAT SERPL: 12 (ref 7–25)
CALCIUM SPEC-SCNC: 8.8 MG/DL (ref 8.4–10.2)
CHLORIDE SERPL-SCNC: 99 MMOL/L (ref 95–110)
CO2 SERPL-SCNC: 28 MMOL/L (ref 22–31)
CREAT BLD-MCNC: 1 MG/DL (ref 0.7–1.3)
DEPRECATED RDW RBC AUTO: 47.2 FL (ref 35.1–43.9)
EOSINOPHIL # BLD AUTO: 0.74 10*3/MM3 (ref 0–0.7)
EOSINOPHIL NFR BLD AUTO: 6.7 % (ref 0–7)
ERYTHROCYTE [DISTWIDTH] IN BLOOD BY AUTOMATED COUNT: 14.7 % (ref 11.5–14.5)
FERRITIN SERPL-MCNC: 70.2 NG/ML (ref 17.9–464)
FOLATE SERPL-MCNC: >20 NG/ML (ref 2.76–21)
GFR SERPL CREATININE-BSD FRML MDRD: 75 ML/MIN/1.73 (ref 60–113)
GLOBULIN UR ELPH-MCNC: 2.9 GM/DL (ref 2.3–3.5)
GLUCOSE BLD-MCNC: 89 MG/DL (ref 60–100)
HCT VFR BLD AUTO: 38.3 % (ref 39–49)
HGB BLD-MCNC: 12.6 G/DL (ref 13.7–17.3)
IMM GRANULOCYTES # BLD: 0.03 10*3/MM3 (ref 0–0.02)
IMM GRANULOCYTES NFR BLD: 0.3 % (ref 0–0.5)
IRON 24H UR-MRATE: 11 MCG/DL (ref 49–181)
IRON SATN MFR SERPL: 3 % (ref 20–55)
LYMPHOCYTES # BLD AUTO: 2.06 10*3/MM3 (ref 0.6–4.2)
LYMPHOCYTES NFR BLD AUTO: 18.6 % (ref 10–50)
MCH RBC QN AUTO: 28.5 PG (ref 26.5–34)
MCHC RBC AUTO-ENTMCNC: 32.9 G/DL (ref 31.5–36.3)
MCV RBC AUTO: 86.7 FL (ref 80–98)
MONOCYTES # BLD AUTO: 0.51 10*3/MM3 (ref 0–0.9)
MONOCYTES NFR BLD AUTO: 4.6 % (ref 0–12)
NEUTROPHILS # BLD AUTO: 7.66 10*3/MM3 (ref 2–8.6)
NEUTROPHILS NFR BLD AUTO: 69.2 % (ref 37–80)
PLATELET # BLD AUTO: 243 10*3/MM3 (ref 150–450)
PMV BLD AUTO: 12.1 FL (ref 8–12)
POTASSIUM BLD-SCNC: 4.8 MMOL/L (ref 3.5–5.1)
PROT SERPL-MCNC: 6.9 G/DL (ref 6.3–8.6)
RBC # BLD AUTO: 4.42 10*6/MM3 (ref 4.37–5.74)
SODIUM BLD-SCNC: 137 MMOL/L (ref 137–145)
TIBC SERPL-MCNC: 333 MCG/DL (ref 261–462)
VIT B12 BLD-MCNC: 710 PG/ML (ref 239–931)
WBC NRBC COR # BLD: 11.07 10*3/MM3 (ref 3.2–9.8)

## 2018-01-09 PROCEDURE — 83540 ASSAY OF IRON: CPT

## 2018-01-09 PROCEDURE — 80053 COMPREHEN METABOLIC PANEL: CPT

## 2018-01-09 PROCEDURE — 82607 VITAMIN B-12: CPT

## 2018-01-09 PROCEDURE — 85025 COMPLETE CBC W/AUTO DIFF WBC: CPT

## 2018-01-09 PROCEDURE — 82728 ASSAY OF FERRITIN: CPT

## 2018-01-09 PROCEDURE — 82746 ASSAY OF FOLIC ACID SERUM: CPT

## 2018-01-09 PROCEDURE — 83550 IRON BINDING TEST: CPT

## 2018-01-09 PROCEDURE — 36415 COLL VENOUS BLD VENIPUNCTURE: CPT | Performed by: INTERNAL MEDICINE

## 2018-01-11 ENCOUNTER — OFFICE VISIT (OUTPATIENT)
Dept: ONCOLOGY | Facility: CLINIC | Age: 63
End: 2018-01-11

## 2018-01-11 VITALS
BODY MASS INDEX: 25.3 KG/M2 | SYSTOLIC BLOOD PRESSURE: 147 MMHG | TEMPERATURE: 98.2 F | RESPIRATION RATE: 18 BRPM | DIASTOLIC BLOOD PRESSURE: 69 MMHG | WEIGHT: 176.3 LBS | HEART RATE: 76 BPM

## 2018-01-11 DIAGNOSIS — D50.8 OTHER IRON DEFICIENCY ANEMIA: Primary | ICD-10-CM

## 2018-01-11 PROCEDURE — 99214 OFFICE O/P EST MOD 30 MIN: CPT | Performed by: NURSE PRACTITIONER

## 2018-01-11 PROCEDURE — G0463 HOSPITAL OUTPT CLINIC VISIT: HCPCS | Performed by: NURSE PRACTITIONER

## 2018-01-11 RX ORDER — FERROUS SULFATE 325(65) MG
TABLET ORAL
Qty: 60 TABLET | Refills: 3 | Status: SHIPPED | OUTPATIENT
Start: 2018-01-11 | End: 2020-12-11

## 2018-01-11 NOTE — PROGRESS NOTES
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  Patient underwent a surgery for squamous cell cancer of the right lung which was found to be stage IB in July 2016.  Patient is here for follow-up visit today.  Denies any worsening fatigue.  Denies any fever or chills or any recent weight loss.  Denies any abnormal swollen glands anywhere in the body.    Since here last was seen by Dr. Ray with repeat CT scan; per pt scan was stable planning to repeat in one yr. He states he has not been taking oral iron; states he did not realize he was suppose to be taking, he has been taking folic acid and B-12 tablets. Denies any black tarry stools, scheduled to see Dr. Griffin pepper.      PAST MEDICAL HISTORY:    Past Medical History:   Diagnosis Date   • Allergic rhinitis    • Atherosclerosis of native arteries of the extremities with ulceration     Bilateral legs   • Benign hypertension    • Carotid artery stenosis    • Coagulopathy     on plavix and asa (instructed to continue per md)   • Coronary atherosclerosis    • Encounter for surgical aftercare following surgery of respiratory system     Right thoracotomy with Carinal Pneumonectomy, Thoracic & Mediastinal Lymphadenetomy, Fiberoptic Bronchoscopy. 7/13/16      • Essential hypertension    • Hypercholesterolemia    • Inguinal pain     small right groin seroma   • Malignant neoplasm of lung     Right upper lobe mass suspicious for lung cancer   • JUAN on CPAP    • PVD (peripheral vascular disease)    • Renal artery stenosis    • Simple chronic bronchitis    • Sleep apnea    • SOB (shortness of breath)    • Squamous cell carcinoma of lung    • Surgical follow-up care        SOCIAL  HISTORY:    Social History   Substance Use Topics   • Smoking status: Former Smoker     Years: 35.00   • Smokeless tobacco: Never Used   • Alcohol use No       Surgical History :  Past Surgical History:   Procedure Laterality Date   • CARDIAC CATHETERIZATION  07/12/2011    Multi-vessel coronary artery disease with critical lesion noted in the LAD coronary artery, left circumflex coronary artery and right coronary artery as described above. The LAD appeared to be chronically occluded    • CAROTID ENDARTERECTOMY  12/03/2013    Right carotid endarterectomy. Carotid cerebral arteriogram   • CORONARY ARTERY BYPASS GRAFT  07/14/2011    LIMA->LAD SVG->OM2 SVG->OM3 SVG->steve branch   • ESOPHAGOSCOPY / EGD  12/06/2011    With tube-Normal hypopharynx, GE junction, Duodenum, symmetrical & patent pylorus. Tongue of columnar epithelium that could be a Ivy's esophagus present. Multiple biopsies performed. Chronic gastritis in antrum. Biopsy taken   • HIP SURGERY  01/18/2016    Right total hip arthroplasty anterior approach.   • LUNG REMOVAL, TOTAL  07/13/2016    Right thoracotomy with Carinal Pneumonectomy, Thoracic & Mediastinal Lymphadenetomy, Fiberoptic Bronchoscopy   • OR THROMBOENDARTECTMY NECK,NECK INCIS Left 3/14/2017    Procedure: CAROTID ENDARTERECTOMY, ARTERIOGRAM ;  Surgeon: Brayden Ray MD;  Location: [facility];  Service: Vascular   • THORACOTOMY  07/13/2016    Right thoracotomy with carinal pneumonectomy.Thoracic and mediastinal lymphadenectomy.Fiberoptic bronchoscopy   • TRANSESOPHAGEAL ECHOCARDIOGRAM (LEIDA)  07/12/2011    Without color flow-LV dysfunction with an EF of 40-45% with hypokinesis/akinesis of the distal septum and apex. Mild left atrial enlargement. No significant valvular abnormalities noted. No pericardial effusion noted       ALLERGIES:    Allergies   Allergen Reactions   • Penicillins Itching     Rash,itching       REVIEW OF SYSTEMS:      CONSTITUTIONAL:  No fever, chills, or night  sweats.     HEENT:  No epistaxis, mouth sores, or difficulty swallowing.    RESPIRATORY:  No new shortness of breath or cough at present.    CARDIOVASCULAR:  No chest pain or palpitations.    GASTROINTESTINAL:  No abdominal pain, nausea, vomiting, or blood in the stool.    GENITOURINARY:  No dysuria or hematuria.    MUSCULOSKELETAL:  No any new back pain or arthralgias.     NEUROLOGICAL:  No tingling or numbness. No new headache or dizziness.     LYMPHATICS:  Denies any abnormal swollen and anywhere in the body.    SKIN:  Denies any new skin rash.    PHYSICAL EXAMINATION:      VITAL SIGNS:  /69  Pulse 76  Temp 98.2 °F (36.8 °C)  Resp 18  Wt 80 kg (176 lb 4.8 oz)  BMI 25.3 kg/m2    GENERAL:  Not in any distress.    HEENT:  Normocephalic, Atraumatic.Mild Conjunctival pallor. No icterus.  No Facial Asymmetry noted.    NECK:  No adenopathy. No JVD.    RESPIRATORY:  Fair air entry bilateral. No rhonchi or wheezing.    CARDIOVASCULAR:  S1, S2. Regular rate and rhythm. No murmur or gallop appreciated.    ABDOMEN:  Soft, obese, nontender. Bowel sounds present in all four quadrants.  No organomegaly appreciated.    EXTREMITIES:  No edema.No Calf Tenderness.    NEUROLOGIC:  Alert, awake and oriented ×3.      SKIN : No new skin lesion identified  DIAGNOSTIC DATA:    Glucose   Date Value Ref Range Status   01/09/2018 89 60 - 100 mg/dL Final     Sodium   Date Value Ref Range Status   01/09/2018 137 137 - 145 mmol/L Final     Potassium   Date Value Ref Range Status   01/09/2018 4.8 3.5 - 5.1 mmol/L Final     CO2   Date Value Ref Range Status   01/09/2018 28.0 22.0 - 31.0 mmol/L Final     Chloride   Date Value Ref Range Status   01/09/2018 99 95 - 110 mmol/L Final     Anion Gap   Date Value Ref Range Status   01/09/2018 10.0 5.0 - 15.0 mmol/L Final     Creatinine   Date Value Ref Range Status   01/09/2018 1.00 0.70 - 1.30 mg/dL Final     BUN   Date Value Ref Range Status   01/09/2018 12 7 - 21 mg/dL Final      BUN/Creatinine Ratio   Date Value Ref Range Status   01/09/2018 12.0 7.0 - 25.0 Final     Calcium   Date Value Ref Range Status   01/09/2018 8.8 8.4 - 10.2 mg/dL Final     eGFR Non  Amer   Date Value Ref Range Status   01/09/2018 75 >60 mL/min/1.73 Final     Alkaline Phosphatase   Date Value Ref Range Status   01/09/2018 84 38 - 126 U/L Final     Total Protein   Date Value Ref Range Status   01/09/2018 6.9 6.3 - 8.6 g/dL Final     ALT (SGPT)   Date Value Ref Range Status   01/09/2018 26 21 - 72 U/L Final     AST (SGOT)   Date Value Ref Range Status   01/09/2018 17 17 - 59 U/L Final     Total Bilirubin   Date Value Ref Range Status   01/09/2018 0.2 0.2 - 1.3 mg/dL Final     Albumin   Date Value Ref Range Status   01/09/2018 4.00 3.40 - 4.80 g/dL Final     Globulin   Date Value Ref Range Status   01/09/2018 2.9 2.3 - 3.5 gm/dL Final     A/G Ratio   Date Value Ref Range Status   01/09/2018 1.4 1.1 - 1.8 g/dL Final     Lab Results   Component Value Date    WBC 11.07 (H) 01/09/2018    HGB 12.6 (L) 01/09/2018    HCT 38.3 (L) 01/09/2018    MCV 86.7 01/09/2018     01/09/2018     Lab Results   Component Value Date    NEUTROABS 7.66 01/09/2018    IRON 11 (L) 01/09/2018    TIBC 333 01/09/2018    LABIRON 3 (L) 01/09/2018    FERRITIN 70.20 01/09/2018    HIBDDOPC62 710 01/09/2018    FOLATE >20.00 01/09/2018     No results found for: , LABCA2, AFPTM, HCGQUANT, , CHROMGRNA, [id number], CEA, REFLABREPO]    RADIOLOGY DATA : CT Chest 10/2017:    COMPARISON: 2/22/2017     CONTRAST: 90 cc intravenous Isovue 300     TECHNIQUE: Axial images were obtained and multiplanar  reconstructions were made.       FINDINGS:     LUNGS/PLEURA: The right lung has been resected. There is fluid in  the right pleural cavity. This is similar to the prior exam.  TRACHEA AND BRONCHI:  The trachea and bronchi are patent.  MEDIASTINUM, ARIANA AND LYMPH NODES: Enlarged right paratracheal  lymph node measuring 1.7 cm in short axis shows  no significant  change in size or appearance.  HEART AND PERICARDIUM: The heart and pericardium are normal.  VASCULAR: Postoperative changes from CABG noted.  UPPER ABDOMEN: Unremarkable  OSSEOUS STRUCTURES: Old right rib fractures are noted.     IMPRESSION:  CONCLUSION:  Postsurgical changes in the right hemithorax have a stable  appearance.  Enlarged right paratracheal lymph node measuring 1.7 cm in short  axis appears unchanged.         ASSESSMENT AND PLAN:      1. Anemia: Anemia workup in the past as shown some multifactorial with iron deficiency, borderline vitamin B12 and folate level.  Patient is status post intravenous Feraheme last dose was on December 29, 2016.  Hgb is stable today but iron levels are starting to decline, he denies any bleeding or black tarry stools, He has not been on the oral iron that was prescribed on last visit. Will send new prescription to pharmacy, will recheck his iron levels again in 2 mos. He is to continue with oral B-12 and folic acid tablet.     2.  Stage IB squamous cell cancer of right lung, status post resection in July 2016.  At that point patient was offered adjuvant chemotherapy by Dr. Randolph but he refused.  Recently done CT of chest on February 22, 2017 did show stable right paratracheal lymph node. Pt is being managed by Dr. Ray, repeating CT in one yr.  3. History of GI bleed due to AVMs in the duodenum. Seeing Dr. Moya again in next few months.    4. Health maintenance, he is not smoking.        This document has been signed by ROSALIO Bruno on January 11, 2018 3:46 PM

## 2018-01-16 NOTE — ANESTHESIA PROCEDURE NOTES
Arterial Line    Patient location during procedure: OR   Preanesthetic Checklist  Completed: patient identified, site marked, surgical consent, pre-op evaluation, timeout performed, IV checked, risks and benefits discussed and monitors and equipment checked  Arterial Line Prep   Sterile Tech: gloves, cap and mask  Prep: ChloraPrep  Patient monitoring: blood pressure monitoring, continuous pulse oximetry and EKG  Arterial Line Procedure   Laterality:right  Location:  radial artery  Catheter size: 20 G   Guidance: palpation technique  Number of attempts: 1         Post Assessment   Dressing Type: occlusive dressing applied and secured with tape.   Complications no             Chief Complaint   Patient presents with   â¢ fatigue     was seen in clinic on 1/2/18. was given tessalon. reports feeling tired, achy and sore. feeling fatigue. reports sob with exertion, and with talking. HISTORY OF PRESENT ILLNESS:  She comes in to be evaluated due to continued fatigue. She was seen back on 2 January diagnosed with viral syndrome, influenza which was going through her household. She continues to have fatigue with no energy. She states the cough is improved no fever or chills. She states she is also feeling very depressed. PAST MEDICAL HISTORY ,PAST SURGICAL HISTORY, SOCIAL HISTORY, MEDICATIONS, AND ALLERGIES:  Reviewed per electronic chart. Family History   Problem Relation Age of Onset   â¢ Heart disease Mother    â¢ Cancer Father    â¢ Colon cancer Father    â¢ Prostate Cancer Father    â¢ High blood pressure Sister    â¢ Heart disease Maternal Grandmother    â¢ Cancer Paternal Grandmother    â¢ Heart disease Paternal Grandfather    â¢ Breast cancer Neg Hx    â¢ Ovarian cancer Neg Hx    â¢ Endometrial cancer Neg Hx      Past Medical History:   Diagnosis Date   â¢ Arthritis    â¢ Bronchitis    â¢ Colon polyp    â¢ Depressive disorder    â¢ Essential (primary) hypertension    â¢ Fracture    â¢ Ischemic colitis (CMS/HCC)    â¢ PCOS (polycystic ovarian syndrome)    â¢ Sleep apnea      Past Surgical History:   Procedure Laterality Date   â¢ COLONOSCOPY     â¢ HYSTERECTOMY       Social History     Social History   â¢ Marital status:      Spouse name: N/A   â¢ Number of children: N/A   â¢ Years of education: N/A     Occupational History   â¢ Not on file.      Social History Main Topics   â¢ Smoking status: Never Smoker   â¢ Smokeless tobacco: Never Used   â¢ Alcohol use No   â¢ Drug use: No   â¢ Sexual activity: Not on file     Other Topics Concern   â¢ Not on file     Social History Narrative   â¢ No narrative on file     Current Outpatient Prescriptions   Medication Sig Dispense Refill   â¢ benzonatate (TESSALON "PERLES) 200 MG capsule Take 1 capsule by mouth 3 times daily as needed for Cough. 10 capsule 0   â¢ olmesartan-hydrochlorothiazide (BENICAR HCT) 40-25 MG per tablet Take 1 tablet by mouth daily. 90 tablet 1   â¢ venlafaxine XR (EFFEXOR XR) 150 MG 24 hr capsule Take 150 mg by mouth daily. â¢ estradiol (ESTRACE) 2 MG tablet Take 1 tablet by mouth daily. 90 tablet 3     No current facility-administered medications for this visit. ALLERGIES:  No Known Allergies      REVIEW OF SYSTEMS:  Constitutional:  Negative for fever, body aches or chills. Skin: Negative rash, burn, abrasion, bruising, abscess/swelling, or laceration. HEENT: Negative for visual acuity changes, eye drainage, eye redness, eye pain, rhinorrhea, nasal congestion, ear pain or sore throat. Respiratory:  Positive for cough, wheezing or shortness of breath. Cardiovascular:  Negative for chest pain or palpitations. Gastrointestinal:  Negative for abdominal pain, nausea, vomiting, bloody stools, constipation or diarrhea. Genitourinary:  Negative for dysuria, hematuria, frequency or urgency. Musculoskeletal: Negative for back pain, neck pain, joint pain or joint swelling. Neurologic: Negative for change in sensory or motor function. Negative for headache, change in gait, vertigo, vision or speech. Psychiatric: Negative for change in affect, anxiety, depression, mentation or sleep disturbance. PHYSICAL EXAM:  Vitals:   Visit Vitals  /82 (BP Location: [facility] [place], Patient Position: Sitting)   Pulse 74   Temp 98.4 Â°F (36.9 Â°C) (Oral)   Resp 16   Ht 5' 5"" (1.651 m)   Wt 99.8 kg   SpO2 97%   BMI 36.61 kg/mÂ²     Constitutional:  No acute distress. Non-toxic appearance. Skin:  Warm. Dry. No erythema. No rash. HENT: Normocephalic. Atraumatic. Oropharynx moist. No oral exudates. Nose normal. Bilateral tympanic membranes are visibly clear and without erythema. Eyes:  Pupils are equal, round, reactive to light and accommodation.  Extraocular " muscles are intact. Bilateral conjunctivae are normal. No discharge. Neck: Normal range of motion. No tenderness. Supple, no enlarged thyroid or thyroid masses felt. Cardiovascular:  Normal heart rate. Normal rhythm. No murmurs. No rubs. No gallops. No gross edema distal pulses equal and normal  Pulmonary:  Clear breath sounds. No respiratory distress. No wheezing. No cough. No crackles. No stridor. Abdomen: Bowel sounds normal. Soft. Nontenderness. Nondistended. No masses. No pulsatile masses. No hepatosplenomegaly. Neurological: cranial nerves II through XII are intact. No focal deficits. Sensory intact. Gait normal.   Psychiatric: Alert and oriented Ã3 Cooperative, appropriate mood and affect. Judgement normal.   Lymphatics: No cervical or supraclavicular lymphadenopathy. LABS/RADIOLOGY:  Orders Placed This Encounter   â¢ Monospot with Reflex   â¢ Comprehensive Metabolic Panel   â¢ CBC & Auto Differential   â¢ Thyroid Stimulating Hormone   â¢ Vitamin D -25 Hydroxy   â¢ Hepatitis C Antibody with Reflex       ASSESSMENT:  Encounter Diagnoses   Name Primary? â¢ Malaise and fatigue Yes       PLAN:    At this point would check rapid mono. Also discussed with her other causes of fatigue. She states she hasn't had any blood work done in some time. Last blood work was done in 2016. At this point all draw basic labs and have her follow-up with her primary doctor. She states she would also like hepatitis C drawn since she is in the age group which is recommended to get that test. This time we'll check a CBC, complete metabolic profile, TSH, vitamin D and hepatitis C. Give her Medrol Dosepak at this time to help with the residual inflammation with coughing. We'll have her follow-up with her primary doctor. All her questions were answered she understands treatment plan and is in agreement with it. Follow up with primary if no improvement of symptoms or if symptoms worsen, please be evaluated at the [address] [facility] [place]. Patient acknowledged understanding of the instructions.

## 2018-03-21 RX ORDER — AMLODIPINE BESYLATE 10 MG/1
TABLET ORAL
Qty: 30 TABLET | Refills: 5 | Status: SHIPPED | OUTPATIENT
Start: 2018-03-21 | End: 2018-09-24 | Stop reason: SDUPTHER

## 2018-05-16 ENCOUNTER — OFFICE VISIT (OUTPATIENT)
Dept: CARDIAC SURGERY | Facility: CLINIC | Age: 63
End: 2018-05-16

## 2018-05-16 ENCOUNTER — LAB (OUTPATIENT)
Dept: LAB | Facility: HOSPITAL | Age: 63
End: 2018-05-16

## 2018-05-16 VITALS
WEIGHT: 172 LBS | SYSTOLIC BLOOD PRESSURE: 126 MMHG | TEMPERATURE: 97.5 F | HEIGHT: 70 IN | HEART RATE: 77 BPM | DIASTOLIC BLOOD PRESSURE: 58 MMHG | OXYGEN SATURATION: 94 % | BODY MASS INDEX: 24.62 KG/M2

## 2018-05-16 DIAGNOSIS — I65.22 STENOSIS OF LEFT CAROTID ARTERY: ICD-10-CM

## 2018-05-16 DIAGNOSIS — C34.91 SQUAMOUS CELL CARCINOMA OF RIGHT LUNG (HCC): ICD-10-CM

## 2018-05-16 DIAGNOSIS — I65.22 STENOSIS OF LEFT CAROTID ARTERY: Primary | ICD-10-CM

## 2018-05-16 DIAGNOSIS — Z79.899 DRUG THERAPY: ICD-10-CM

## 2018-05-16 LAB
25(OH)D3 SERPL-MCNC: 29.7 NG/ML (ref 30–100)
ALBUMIN SERPL-MCNC: 4.2 G/DL (ref 3.4–4.8)
ALBUMIN/GLOB SERPL: 1.6 G/DL (ref 1.1–1.8)
ALP SERPL-CCNC: 85 U/L (ref 38–126)
ALT SERPL W P-5'-P-CCNC: 24 U/L (ref 21–72)
ANION GAP SERPL CALCULATED.3IONS-SCNC: 10 MMOL/L (ref 5–15)
AST SERPL-CCNC: 13 U/L (ref 17–59)
BILIRUB SERPL-MCNC: 0.3 MG/DL (ref 0.2–1.3)
BUN BLD-MCNC: 12 MG/DL (ref 7–21)
BUN/CREAT SERPL: 12.4 (ref 7–25)
CALCIUM SPEC-SCNC: 9.4 MG/DL (ref 8.4–10.2)
CHLORIDE SERPL-SCNC: 98 MMOL/L (ref 95–110)
CO2 SERPL-SCNC: 30 MMOL/L (ref 22–31)
CREAT BLD-MCNC: 0.97 MG/DL (ref 0.7–1.3)
GFR SERPL CREATININE-BSD FRML MDRD: 78 ML/MIN/1.73 (ref 49–113)
GLOBULIN UR ELPH-MCNC: 2.7 GM/DL (ref 2.3–3.5)
GLUCOSE BLD-MCNC: 96 MG/DL (ref 60–100)
POTASSIUM BLD-SCNC: 4.3 MMOL/L (ref 3.5–5.1)
PROT SERPL-MCNC: 6.9 G/DL (ref 6.3–8.6)
SODIUM BLD-SCNC: 138 MMOL/L (ref 137–145)

## 2018-05-16 PROCEDURE — 36415 COLL VENOUS BLD VENIPUNCTURE: CPT

## 2018-05-16 PROCEDURE — 82306 VITAMIN D 25 HYDROXY: CPT

## 2018-05-16 PROCEDURE — 80053 COMPREHEN METABOLIC PANEL: CPT

## 2018-05-16 PROCEDURE — 99214 OFFICE O/P EST MOD 30 MIN: CPT | Performed by: NURSE PRACTITIONER

## 2018-05-16 RX ORDER — PANTOPRAZOLE SODIUM 40 MG/1
TABLET, DELAYED RELEASE ORAL
COMMUNITY
Start: 2018-02-15

## 2018-05-16 RX ORDER — BUDESONIDE AND FORMOTEROL FUMARATE DIHYDRATE 80; 4.5 UG/1; UG/1
2 AEROSOL RESPIRATORY (INHALATION)
COMMUNITY
Start: 2018-02-22

## 2018-05-16 NOTE — PATIENT INSTRUCTIONS
Carotid Duplex:  Both carotid 0-49% probable 20 % range  Repeat in 6 months unless If you should experience any neurological symptoms including but not limited to visual or speech disturbances confusion, seizures, or weakness of limbs of one side of your body notify [facility] V[facility] center immediately for evaluation or if after hours present to the nearest Emergency Department.   Continue healthy lifestyle:  Not smoking  Exercise  Heart healthy diet    Recommend continue medical management with antiplatelet therapy, and statin therapy.  Secondary ACE/BB  Vitamin D has been shown to promote healthy lining of arteries.  Check level and replace as needed.   Lab today   CT chest first available 5/18/18

## 2018-05-18 ENCOUNTER — HOSPITAL ENCOUNTER (OUTPATIENT)
Dept: CT IMAGING | Facility: HOSPITAL | Age: 63
Discharge: HOME | End: 2018-05-18
Admitting: NURSE PRACTITIONER

## 2018-05-18 DIAGNOSIS — C34.91 SQUAMOUS CELL CARCINOMA OF RIGHT LUNG (HCC): ICD-10-CM

## 2018-05-18 PROCEDURE — 71260 CT THORAX DX C+: CPT

## 2018-05-18 PROCEDURE — 25010000002 IOPAMIDOL 61 % SOLUTION: Performed by: NURSE PRACTITIONER

## 2018-05-18 RX ADMIN — IOPAMIDOL 80 ML: 612 INJECTION, SOLUTION INTRAVENOUS at 15:19

## 2018-05-22 NOTE — PROGRESS NOTES
Subjective   Patient ID: Juanito Pruitt is a 62 y.o. male is here today for follow-up for carotid stenosis.   CC;   No neurosensory symptoms Rib fracture hurts at intervals.  Chelsie    Carotid Artery Disease   Associated symptoms include chest pain (Intermittent R rib cage pain with lifting of RUE ). Pertinent negatives include no anorexia, change in bowel habit, chills, coughing, fever, headaches, joint swelling, nausea, numbness or sore throat.     Peripheral Vascular Disease:    PCP:  Dr Dalton  Cardiology:  Dr Thompson  GI:  Dr Moya      62yr man with PVD, Carotid Stenosis, CAD, HTN, dyslipidemia, GERD.   RIGHT groin pain resolved.  progressive anemia, workup in progress.  Carotid stenosis requires treatment.  No hemoptysis, cough, wt loss.  Claudication None, No rest pain, No ischemic tissue loss  No amaurosis fugax, No TIA, No stroke  No other associated signs, symptoms or modifying factors.  Is most concern about RRight lateral rib fracture, does cause intermittent pain with lifting of RUE, stable.  Back goes numb for approximately 45 sec, he has to stop, and then has to void.  He is asking about CT chest scheduling.  No hospitalization in last 6 mths.     12/2011:  bilateral renal artery stents  4/20/2012 JUAN:  RIGHT 1.1 triphasic, LEFT 1.1 triphasic  6/7/2012 Exercise JUAN:  RIGHT .89 to .69, LEFT 1.0 to .87  4/25/2014 US RIGHT groin:  6x2x0on fluid collection deep to femoral vessels.  5/13/2014 US drainage seroma:  10ml serous fluid, no residual seroma.  5/20/2014 US RIGHT groin:  5x2x0.5cm fluid collection deep to femoral vessels.  7/21/2014 Renal Artery Duplex:  RIGHT maxPSV 222cm/s,RI .87, RAR 2.8.  LEFT maxPSV 271cm/s, RI .88, RAR 3.4.  patent bilateral renal artery stents.    9/12/2012 Carotid Duplex:  ARIC 70-75% antegrade.  LICA <50% antegrade (maxPSV 96cm/s).  9/17/2012 CTA Carotids:  ARIC 60% small ulcerative plaque.  LICA mild irregularity.  10/11/2013 CTA Carotids:  ARIC 85%, LICA mild  irregularity  12/3/13 RIGHT CEA   2014 Carotid Duplex:  ARIC 0-15%  2014 Carotid Duplex:  ARIC 0-15% antegrade.  LICA 50-79% antegrade (maxPSV 145cm/s).  2015 Carotid Duplex:  AIRC 0-15% antegrade.  LICA 50-79% antegrade (maxPSV 147cm/s).  2016  Carotid Duplex:  ARIC 0-15% antegrade.  LICA 50-79% antegrade ([id number] cm/s, ratio 2.5) .   2016 CTA Carotids:  ARIC no significant disease, LICA 75-80%.  17  LEFT CEA          17  Carotid Duplex:  LICA 0-45% antegrade        17  Carotid Duplex:  ARIC 0-49% maxPSV 91 ratio 1.3 antegrade.  LICA 0-49% % antegrade [id number] cm/s, ratio 1.4         18  Carotid Duplex:  ARIC 0-49% maxPSV 81 ratio 1.0  LICA 0-49% % antegrade [id number] cm/s, ratio 1.6  Vertebrals antegrade      2011 CXR:  LEFT lower lobe atelectasis.  2016 CXR:  RIGHT paratracheal mass, medial segment atelectasis.  2016 PET CT:  RIGHT upper lobe post obstructive atelectasis.  mass obstructing upper lobe bronchus SUV 10.  no significant adenopathy.  liver, adrenals ok.  2016 PFT:  FVC 4.2 (95%), FEV1 1.7 (47%), DLCO 48%  2016 AB.42/41/57/26/90%  2016 Quantitative lung perfusion scan:  RIGHT 31%, LEFT 69% (predicted post pneumonectomy FEV1 1100ml, adequate for resection)  16  Right thoracotomy with Carinal Pneumonectomy, Thoracic & Mediastinal Lymphadenetomy, Fiberoptic Bronchoscopy.    7/15/16  Hospital DC   PATH:  FINAL  RIGHT LUNG:  SQUAMOUS CARCINOMA, MODERATELY DIFFERENTIATED OF       UPPER LOBE BRONCHUS (4.5 CM IN GREATEST DIMENSION).  THE NEOPLASM IS 0.5 CM AWAY FROM THE BRONCHIAL MARGIN       AND FOUR PERIBRONCHIAL LYMPH NODES SHOW NO       EVIDENCE OF MALIGNANCY.  SUBPLEURAL ABSCESS DISTAL TO THE TUMOR.  NO EVIDENCE OF ANGIOLYMPHATIC INVOLVEMENT.  B.    LEVEL 10 LYMPH NODES (2):  NO SIGNIFICANT PATHOLOGIC DIAGNOSIS.  C.    LEVEL 7 LYMPH NODES (3):  NO SIGNIFICANT PATHOLOGIC DIAGNOSIS.  16  Chest xray:  expected  postoperative changes following right pneumonectomy. Left lung appears clear. There is a displaced right lateral sixth rib fracture it appears to be acute.   8/29/16  Chest xray:  Old healed left rib fractures noted. Displaced right sixth rib fracture unchanged in appearance. CONCLUSION- No significant change in near complete opacification of the right hemithorax. Left lung remains clear.   11/15/16  Chest xray:  consistent with postpneumonectomy changes. There is again noted a displaced right lateral sixth rib fracture. The patient is status post median sternotomy. Emphysema is noted in the left lung. The left lung is otherwise clear. CONCLUSION- No acute disease.   2/22/2017 CT Chest:  No new nodules.  Possible RIGHT paratracheal lymph node vs postop change.  Liver adrenal ok.    The following portions of the patient's history were reviewed and updated as appropriate: allergies, current medications, past family history, past medical history, past social history, past surgical history and problem list.   See HPI   Allergies   Allergen Reactions   • Penicillins Itching     Rash,itching         Current Outpatient Prescriptions:   •  albuterol (PROVENTIL HFA;VENTOLIN HFA) 108 (90 BASE) MCG/ACT inhaler, Inhale 2 puffs every 4 (four) hours as needed for wheezing., Disp: , Rfl:   •  amLODIPine (NORVASC) 10 MG tablet, TAKE ONE TABLET BY MOUTH DAILY, Disp: 30 tablet, Rfl: 5  •  aspirin 81 MG EC tablet, Take 81 mg by mouth Daily. Instructed to cont taking, Disp: , Rfl:   •  budesonide-formoterol (SYMBICORT) 80-4.5 MCG/ACT inhaler, Inhale 2 puffs., Disp: , Rfl:   •  clopidogrel (PLAVIX) 75 MG tablet, TAKE ONE TABLET BY MOUTH DAILY, Disp: 30 tablet, Rfl: 11  •  ferrous sulfate 325 (65 FE) MG tablet, One tablet BID, Disp: 60 tablet, Rfl: 3  •  folic acid (FOLVITE) 1 MG tablet, TAKE ONE TABLET BY MOUTH DAILY, Disp: 90 tablet, Rfl: 3  •  furosemide (LASIX) 40 MG tablet, TAKE ONE-HALF TABLET BY MOUTH DAILY, Disp: 30 tablet, Rfl:  3  •  labetalol (NORMODYNE) 200 MG tablet, TAKE ONE TABLET BY MOUTH TWICE A DAY, Disp: 60 tablet, Rfl: 11  •  lidocaine (LIDODERM) 5 %, Place 1 patch on the skin Daily. Remove & Discard patch within 12 hours or as directed by MD, Disp: 30 patch, Rfl: 0  •  pantoprazole (PROTONIX) 40 MG EC tablet, , Disp: , Rfl:     Review of Systems   Constitution: Negative for chills, decreased appetite, fever and malaise/fatigue.   HENT: Negative for hearing loss, hoarse voice, nosebleeds and sore throat.         No ear rhythmic pulsation     Cardiovascular: Positive for chest pain (Intermittent R rib cage pain with lifting of RUE ). Negative for claudication, cyanosis, dyspnea on exertion, leg swelling, near-syncope and syncope.   Respiratory: Negative for cough, hemoptysis, shortness of breath and wheezing.    Hematologic/Lymphatic: Does not bruise/bleed easily.   Skin: Negative for color change and poor wound healing.        All incisions well healed    Musculoskeletal: Positive for back pain. Negative for joint swelling, muscle cramps and muscle weakness.        Sudden onset shooting numbness of back, last appx 45 sec and has to void post.    Gastrointestinal: Negative for anorexia, change in bowel habit, hematemesis, melena and nausea.   Genitourinary: Negative for bladder incontinence, dysuria, hematuria, hesitancy and urgency.   Neurological: Negative for brief paralysis, difficulty with concentration, dizziness, focal weakness, headaches, light-headedness, numbness, paresthesias and sensory change.             Denies any amaurosis fugae, paresthesias, paralysis, change in speech, or neurosensory symptoms.  Denies any rhytmic pulsation in ears     Psychiatric/Behavioral: Negative for altered mental status.   Allergic/Immunologic: Negative for environmental allergies and hives.        Objective   Physical Exam   Constitutional: He is oriented to person, place, and time. He appears well-nourished.   HENT:   Head:  Normocephalic.   Mouth/Throat: Oropharynx is clear and moist.   Tongue midline Facial movements symmetrical Sensation intact    Eyes: Conjunctivae and EOM are normal. Pupils are equal, round, and reactive to light.   Neck: Neck supple. No JVD present. No tracheal deviation present.   Cardiovascular: Normal rate, regular rhythm, normal heart sounds and intact distal pulses.    Pulses:       Carotid pulses are 1+ on the right side with bruit, and 1+ on the left side.       Radial pulses are 2+ on the right side, and 2+ on the left side.        Posterior tibial pulses are 2+ on the right side, and 2+ on the left side.   Pulmonary/Chest: Effort normal and breath sounds normal. No stridor. He exhibits no tenderness.   Tender when lifts arm    Abdominal: Soft. Bowel sounds are normal.   Musculoskeletal: He exhibits no edema or tenderness.   Symmetrical = movements      Neurological: He is alert and oriented to person, place, and time. No cranial nerve deficit.   Skin: Skin is warm and dry. Capillary refill takes less than 2 seconds. No rash noted. No erythema. No pallor.   Psychiatric: His behavior is normal. Judgment normal.   Nursing note and vitals reviewed.    Vitals:    05/16/18 1417   BP: 126/58   Pulse: 77   Temp: 97.5 °F (36.4 °C)   SpO2: 94%     Lab Results   Component Value Date    GLUCOSE 96 05/16/2018    BUN 12 05/16/2018    CREATININE 0.97 05/16/2018    EGFRIFNONA 78 05/16/2018    BCR 12.4 05/16/2018    K 4.3 05/16/2018    CO2 30.0 05/16/2018    CALCIUM 9.4 05/16/2018    ALBUMIN 4.20 05/16/2018    LABIL2 1.6 05/16/2018    AST 13 (L) 05/16/2018    ALT 24 05/16/2018     Vit D 29.7        Assessment/Plan   Independent Review of Radiographic Studies:          05/18/18  Carotid Duplex:  ARIC 0-49% maxPSV 81 ratio 1.0  LICA 0-49% % antegrade [id number] cm/s, ratio 1.6  Vertebrals antegrade    1. Stenosis of left carotid artery  SP  CEA, no restenosis  Mild disease.   Recheck in 6 months, unless symptoms occur  If  you should experience any neurological symptoms including but not limited to visual or speech disturbances confusion, seizures, or weakness of limbs of one side of your body notify [facility] center immediately for evaluation or if after hours present to the nearest Emergency Department.   Continue healthy lifestyle:  Not smoking  Exercise  Heart healthy diet    Recommend continue medical management with antiplatelet therapy..  Secondary ACE/BB  Pt is intolerant statins.   Vitamin D has been shown to promote healthy lining of arteries.  Check level and replace as needed.   Lab today   CT chest first availables.   Recommend healthy life style:   Continue not smoking. Walking total of 45 minutes per day in minimum of 15 minute intervals   Avoid  Going  barefoot.  Non perfumed cream for for dry skin    2.  Vit D Deficiency  Vit D OTC 5000 units MWF     3.  Lung  Surveillance SP Squamous cell CA  CT Scan 5/18/18      Detailed discussion regarding risks, benefits, and treatment plan. Images independently reviewed. Patient understands, agrees, and wishes to proceed with plan.

## 2018-05-30 RX ORDER — FUROSEMIDE 40 MG/1
TABLET ORAL
Qty: 30 TABLET | Refills: 2 | Status: SHIPPED | OUTPATIENT
Start: 2018-05-30 | End: 2018-12-03 | Stop reason: SDUPTHER

## 2018-06-01 ENCOUNTER — OFFICE VISIT (OUTPATIENT)
Dept: CARDIAC SURGERY | Facility: CLINIC | Age: 63
End: 2018-06-01

## 2018-06-01 VITALS
WEIGHT: 179.1 LBS | BODY MASS INDEX: 25.64 KG/M2 | OXYGEN SATURATION: 94 % | HEART RATE: 72 BPM | SYSTOLIC BLOOD PRESSURE: 132 MMHG | DIASTOLIC BLOOD PRESSURE: 70 MMHG | HEIGHT: 70 IN

## 2018-06-01 DIAGNOSIS — J43.1 PANLOBULAR EMPHYSEMA (HCC): ICD-10-CM

## 2018-06-01 DIAGNOSIS — E78.00 HYPERCHOLESTEROLEMIA: ICD-10-CM

## 2018-06-01 DIAGNOSIS — I70.1 RENAL ARTERY STENOSIS (HCC): ICD-10-CM

## 2018-06-01 DIAGNOSIS — I10 ESSENTIAL HYPERTENSION: ICD-10-CM

## 2018-06-01 DIAGNOSIS — I73.9 PVD (PERIPHERAL VASCULAR DISEASE) (HCC): ICD-10-CM

## 2018-06-01 DIAGNOSIS — C34.91 SQUAMOUS CELL CARCINOMA OF RIGHT LUNG (HCC): Primary | ICD-10-CM

## 2018-06-01 DIAGNOSIS — I25.10 CORONARY ARTERY DISEASE INVOLVING NATIVE CORONARY ARTERY OF NATIVE HEART WITHOUT ANGINA PECTORIS: ICD-10-CM

## 2018-06-01 PROCEDURE — 99214 OFFICE O/P EST MOD 30 MIN: CPT | Performed by: THORACIC SURGERY (CARDIOTHORACIC VASCULAR SURGERY)

## 2018-06-01 NOTE — PROGRESS NOTES
6/1/2018    Juanito Pruitt  1955    Chief Complaint:  Lung cancer  HPI:      PCP:  Preston Dalton MD  Peripheral Vascular Disease:    PCP:  Dr Dalton  Cardiology:  Dr Thompson  GI:  Dr Moya  Hematology:  Dr Randolph     60yr man with PVD, Carotid Stenosis, CAD, HTN, dyslipidemia, GERD.   RIGHT groin pain resolved.  progressive anemia, workup in progress.  Carotid stenosis requires treatment.  No hemoptysis, cough, wt loss.  Claudication None, No rest pain, No ischemic tissue loss  No amaurosis fugax, No TIA, No stroke  No other associated signs, symptoms or modifying factors.     12/2011:  bilateral renal artery stents  4/20/2012 JUAN:  RIGHT 1.1 triphasic, LEFT 1.1 triphasic  6/7/2012 Exercise JUAN:  RIGHT .89 to .69, LEFT 1.0 to .87  4/25/2014 US RIGHT groin:  [id number] fluid collection deep to femoral vessels.  5/13/2014 US drainage seroma:  10ml serous fluid, no residual seroma.  5/20/2014 US RIGHT groin:  5x2x0.5cm fluid collection deep to femoral vessels.  7/21/2014 Renal Artery Duplex:  RIGHT maxPSV 222cm/s,RI .87, RAR 2.8.  LEFT maxPSV 271cm/s, RI .88, RAR 3.4.  patent bilateral renal artery stents.     9/12/2012 Carotid Duplex:  ARIC 70-75% antegrade.  LICA <50% antegrade (maxPSV 96cm/s).  9/17/2012 CTA Carotids:  ARIC 60% small ulcerative plaque.  LICA mild irregularity.  10/11/2013 CTA Carotids:  ARIC 85%, LICA mild irregularity  12/3/13 RIGHT CEA   1/13/2014 Carotid Duplex:  ARIC 0-15%  7/21/2014 Carotid Duplex:  ARIC 0-15% antegrade.  LICA 50-79% antegrade (maxPSV 145cm/s).  1/26/2015 Carotid Duplex:  ARIC 0-15% antegrade.  LICA 50-79% antegrade (maxPSV 147cm/s).  2/1/2016  Carotid Duplex:  ARIC 0-15% antegrade.  LICA 50-79% antegrade ([id number] cm/s, ratio 2.5) .   2/25/2016 CTA Carotids:  ARIC no significant disease, LICA 75-80%.  3/23/17  LEFT CEA          5/04/17  Carotid Duplex:  LICA 0-45% antegrade        11/07/17  Carotid Duplex:  ARIC 0-49% maxPSV 91 ratio 1.3 antegrade.   LICA 0-49% % antegrade [id number] cm/s, ratio 1.4         18  Carotid Duplex:  ARIC 0-49% maxPSV 81 ratio 1.0  LICA 0-49% % antegrade [id number] cm/s, ratio 1.6  Vertebrals antegrade    2011 CXR:  LEFT lower lobe atelectasis.  2016 CXR:  RIGHT paratracheal mass, medial segment atelectasis.  2016 PET CT:  RIGHT upper lobe post obstructive atelectasis.  mass obstructing upper lobe bronchus SUV 10.  no significant adenopathy.  liver, adrenals ok.  2016 PFT:  FVC 4.2 (95%), FEV1 1.7 (47%), DLCO 48%  2016 AB.42/41/57/26/90%  2016 Quantitative lung perfusion scan:  RIGHT 31%, LEFT 69% (predicted post pneumonectomy FEV1 1100ml, adequate for resection)  16  Right thoracotomy with Carinal Pneumonectomy, Thoracic & Mediastinal Lymphadenetomy, Fiberoptic Bronchoscopy.    7/15/16  Hospital DC   PATH:  FINAL  RIGHT LUNG:  SQUAMOUS CARCINOMA, MODERATELY DIFFERENTIATED OF       UPPER LOBE BRONCHUS (4.5 CM IN GREATEST DIMENSION).  THE NEOPLASM IS 0.5 CM AWAY FROM THE BRONCHIAL MARGIN       SUBPLEURAL ABSCESS DISTAL TO THE TUMOR.  NO EVIDENCE OF ANGIOLYMPHATIC INVOLVEMENT.  All lymph nodes negative.  16  Chest xray:  expected postoperative changes following right pneumonectomy. Left lung appears clear. There is a displaced right lateral sixth rib fracture it appears to be acute.   16  Chest xray:  Old healed left rib fractures noted. Displaced right sixth rib fracture unchanged in appearance. CONCLUSION- No significant change in near complete opacification of the right hemithorax. Left lung remains clear.   11/15/16  Chest xray:  consistent with postpneumonectomy changes. There is again noted a displaced right lateral sixth rib fracture. The patient is status post median sternotomy. Emphysema is noted in the left lung. The left lung is otherwise clear. CONCLUSION- No acute disease.   2017 CT Chest:  No new nodules.  Possible RIGHT paratracheal lymph node vs postop change.  Liver  adrenal ok.  5/18/2018 CT Chest:  No new nodules.  no change RIGHT paratracheal lymph node.  Liver adrenals ok.    The following portions of the patient's history were reviewed and updated as appropriate: allergies, current medications, past family history, past medical history, past social history, past surgical history and problem list.  Recent images independently reviewed.  Available laboratory values reviewed.    PMH:  Past Medical History:   Diagnosis Date   • Allergic rhinitis    • Atherosclerosis of native arteries of the extremities with ulceration     Bilateral legs   • Benign hypertension    • Carotid artery stenosis    • Coagulopathy     on plavix and asa (instructed to continue per md)   • Coronary atherosclerosis    • Essential hypertension    • Hypercholesterolemia    • Inguinal pain     small right groin seroma   • Malignant neoplasm of lung     Right upper lobe mass suspicious for lung cancer   • JUAN on CPAP    • PVD (peripheral vascular disease)    • Renal artery stenosis    • Simple chronic bronchitis    • Sleep apnea    • SOB (shortness of breath)    • Squamous cell carcinoma of lung        ALLERGIES:  Allergies   Allergen Reactions   • Penicillins Itching     Rash,itching         MEDICATIONS:    Current Outpatient Prescriptions:   •  albuterol (PROVENTIL HFA;VENTOLIN HFA) 108 (90 BASE) MCG/ACT inhaler, Inhale 2 puffs every 4 (four) hours as needed for wheezing., Disp: , Rfl:   •  amLODIPine (NORVASC) 10 MG tablet, TAKE ONE TABLET BY MOUTH DAILY, Disp: 30 tablet, Rfl: 5  •  aspirin 81 MG EC tablet, Take 81 mg by mouth Daily. Instructed to cont taking, Disp: , Rfl:   •  budesonide-formoterol (SYMBICORT) 80-4.5 MCG/ACT inhaler, Inhale 2 puffs., Disp: , Rfl:   •  Cholecalciferol (VITAMIN D3) 5000 units capsule capsule, Take 5,000 Units by mouth Every Other Day. Takes Monday Wednesday Friday, Disp: , Rfl:   •  clopidogrel (PLAVIX) 75 MG tablet, TAKE ONE TABLET BY MOUTH DAILY, Disp: 30 tablet, Rfl:  11  •  ferrous sulfate 325 (65 FE) MG tablet, One tablet BID, Disp: 60 tablet, Rfl: 3  •  folic acid (FOLVITE) 1 MG tablet, TAKE ONE TABLET BY MOUTH DAILY, Disp: 90 tablet, Rfl: 3  •  furosemide (LASIX) 40 MG tablet, TAKE ONE-HALF TABLET BY MOUTH DAILY, Disp: 30 tablet, Rfl: 2  •  labetalol (NORMODYNE) 200 MG tablet, TAKE ONE TABLET BY MOUTH TWICE A DAY, Disp: 60 tablet, Rfl: 11  •  lidocaine (LIDODERM) 5 %, Place 1 patch on the skin Daily. Remove & Discard patch within 12 hours or as directed by MD, Disp: 30 patch, Rfl: 0  •  pantoprazole (PROTONIX) 40 MG EC tablet, , Disp: , Rfl:     Review of Systems   Review of Systems   Constitution: Positive for malaise/fatigue. Negative for weakness and weight loss.   Cardiovascular: Positive for dyspnea on exertion. Negative for chest pain and claudication.   Respiratory: Negative for cough and shortness of breath.    Skin: Negative for color change and poor wound healing.   Musculoskeletal: Positive for back pain.   Neurological: Negative for dizziness and numbness.       Physical Exam   Physical Exam   Constitutional: He is oriented to person, place, and time. He is active and cooperative. He does not appear ill. No distress.   HENT:   Right Ear: Hearing normal.   Left Ear: Hearing normal.   Nose: No nasal deformity. No epistaxis.   Mouth/Throat: He does not have dentures. Normal dentition.   Cardiovascular: Normal rate and regular rhythm.    No murmur heard.  Pulses:       Carotid pulses are 2+ on the right side with bruit, and 2+ on the left side.       Radial pulses are 2+ on the right side, and 2+ on the left side.        Posterior tibial pulses are 2+ on the right side, and 2+ on the left side.   Pulmonary/Chest: Effort normal and breath sounds normal.   Abdominal: Soft. He exhibits no distension and no mass. There is no tenderness.   Musculoskeletal: He exhibits no deformity.   Gait normal.    Neurological: He is alert and oriented to person, place, and time. He has  normal strength.   Skin: Skin is warm and dry. No cyanosis or erythema. No pallor.   No venous staining   Psychiatric: He has a normal mood and affect. His speech is normal. Judgment and thought content normal.     Results for JORGE PRUITT (MRN [id number]) as of 6/1/2018 10:56   Ref. Range 5/16/2018 14:58   Creatinine Latest Ref Range: 0.70 - 1.30 mg/dL 0.97   BUN Latest Ref Range: 7 - 21 mg/dL 12     ASSESSMENT:  Jorge was seen today for lung nodule.    Diagnoses and all orders for this visit:    Squamous cell carcinoma of right lung  -     CT Chest Without Contrast; Future    Essential hypertension    Hypercholesterolemia    PVD (peripheral vascular disease)    Renal artery stenosis    Coronary artery disease involving native coronary artery of native heart without angina pectoris    Panlobular emphysema    PLAN:  Detailed discussion with Jorge Pruitt regarding situation and options.  Stable lung cancer, no evidence recurrence or new primary.  Stable carotid stenosis.  Multiple risk factors with severe comorbidities.  No intervention indicated at this time.  Will follow with interval imaging.  Risks, benefits discussed.  Understands and wishes to proceed with plan.    Return in 6 months with Carotid Duplex and CT Chest without contrast    Return after above studies complete  Recommended regular physical activity, progressive walking program.  Continue current medications as directed.    Thank you for the opportunity to participate in this patient's care.    Copy to primary care provider.    EMR Dragon/Transcription disclaimer:   Much of this encounter note is an electronic transcription/translation of spoken language to printed text. The electronic translation of spoken language may permit erroneous, or at times, nonsensical words or phrases to be inadvertently transcribed; Although I have reviewed the note for such errors, some may still exist.

## 2018-07-30 ENCOUNTER — OFFICE VISIT (OUTPATIENT)
Dept: CARDIOLOGY | Facility: CLINIC | Age: 63
End: 2018-07-30

## 2018-07-30 VITALS
WEIGHT: 179 LBS | SYSTOLIC BLOOD PRESSURE: 112 MMHG | BODY MASS INDEX: 25.62 KG/M2 | DIASTOLIC BLOOD PRESSURE: 60 MMHG | HEIGHT: 70 IN | HEART RATE: 74 BPM

## 2018-07-30 DIAGNOSIS — I25.10 CORONARY ARTERY DISEASE INVOLVING NATIVE CORONARY ARTERY OF NATIVE HEART WITHOUT ANGINA PECTORIS: ICD-10-CM

## 2018-07-30 DIAGNOSIS — I15.9 SECONDARY HYPERTENSION: ICD-10-CM

## 2018-07-30 DIAGNOSIS — E78.2 MIXED HYPERLIPIDEMIA: Primary | ICD-10-CM

## 2018-07-30 PROCEDURE — 99214 OFFICE O/P EST MOD 30 MIN: CPT | Performed by: INTERNAL MEDICINE

## 2018-07-30 PROCEDURE — 93000 ELECTROCARDIOGRAM COMPLETE: CPT | Performed by: INTERNAL MEDICINE

## 2018-07-31 NOTE — PROGRESS NOTES
Juanito Pruitt  62 y.o. male    07/30/2018  1. Mixed hyperlipidemia    2. Secondary hypertension    3. Coronary artery disease involving native coronary artery of native heart without angina pectoris        History of Present Illness    Mr. Pruitt is here for follow-up of his above stated problems.  He denied any chest pain, shortness of breath, palpitation, dizziness or syncope.  His blood pressure has been in the normal range.  His peripheral vascular disease is stable.        SUBJECTIVE    Allergies   Allergen Reactions   • Penicillins Itching     Rash,itching         Past Medical History:   Diagnosis Date   • Allergic rhinitis    • Atherosclerosis of native arteries of the extremities with ulceration (CMS/HCC)     Bilateral legs   • Benign hypertension    • Carotid artery stenosis    • Coagulopathy (CMS/HCC)     on plavix and asa (instructed to continue per md)   • Coronary atherosclerosis    • Essential hypertension    • Hypercholesterolemia    • Inguinal pain     small right groin seroma   • Malignant neoplasm of lung (CMS/HCC)     Right upper lobe mass suspicious for lung cancer   • JUAN on CPAP    • PVD (peripheral vascular disease) (CMS/HCC)    • Renal artery stenosis (CMS/HCC)    • Simple chronic bronchitis (CMS/HCC)    • Sleep apnea    • SOB (shortness of breath)    • Squamous cell carcinoma of lung (CMS/HCC)          Past Surgical History:   Procedure Laterality Date   • CARDIAC CATHETERIZATION  07/12/2011    Multi-vessel coronary artery disease with critical lesion noted in the LAD coronary artery, left circumflex coronary artery and right coronary artery as described above. The LAD appeared to be chronically occluded    • CAROTID ENDARTERECTOMY  12/03/2013    Right carotid endarterectomy. Carotid cerebral arteriogram   • CORONARY ARTERY BYPASS GRAFT  07/14/2011    LIMA->LAD SVG->OM2 SVG->OM3 SVG->steve branch   • ESOPHAGOSCOPY / EGD  12/06/2011    With tube-Normal hypopharynx, GE junction, Duodenum,  symmetrical & patent pylorus. Tongue of columnar epithelium that could be a Ivy's esophagus present. Multiple biopsies performed. Chronic gastritis in antrum. Biopsy taken   • HIP SURGERY  01/18/2016    Right total hip arthroplasty anterior approach.   • LUNG REMOVAL, TOTAL  07/13/2016    Right thoracotomy with Carinal Pneumonectomy, Thoracic & Mediastinal Lymphadenetomy, Fiberoptic Bronchoscopy   • HI THROMBOENDARTECTMY NECK,NECK INCIS Left 3/14/2017    Procedure: CAROTID ENDARTERECTOMY, ARTERIOGRAM ;  Surgeon: Brayden Ray MD;  Location: [facility];  Service: Vascular   • THORACOTOMY  07/13/2016    Right thoracotomy with carinal pneumonectomy.Thoracic and mediastinal lymphadenectomy.Fiberoptic bronchoscopy   • TRANSESOPHAGEAL ECHOCARDIOGRAM (LEIDA)  07/12/2011    Without color flow-LV dysfunction with an EF of 40-45% with hypokinesis/akinesis of the distal septum and apex. Mild left atrial enlargement. No significant valvular abnormalities noted. No pericardial effusion noted         Family History   Problem Relation Age of Onset   • Heart disease Mother    • Other Mother         ischemic heart disease   • Hypertension Mother    • Heart disease Father    • Other Father         ischemic heart disease   • Hypertension Father    • Coronary artery disease Other    • Cancer Sister          Social History     Social History   • Marital status:      Spouse name: N/A   • Number of children: N/A   • Years of education: N/A     Occupational History   • Not on file.     Social History Main Topics   • Smoking status: Former Smoker     Years: 35.00   • Smokeless tobacco: Never Used   • Alcohol use No   • Drug use: No   • Sexual activity: Defer     Other Topics Concern   • Not on file     Social History Narrative   • No narrative on file         Current Outpatient Prescriptions   Medication Sig Dispense Refill   • albuterol (PROVENTIL HFA;VENTOLIN HFA) 108 (90 BASE) MCG/ACT inhaler Inhale 2 puffs every 4  "(four) hours as needed for wheezing.     • amLODIPine (NORVASC) 10 MG tablet TAKE ONE TABLET BY MOUTH DAILY 30 tablet 5   • aspirin 81 MG EC tablet Take 81 mg by mouth Daily. Instructed to cont taking     • budesonide-formoterol (SYMBICORT) 80-4.5 MCG/ACT inhaler Inhale 2 puffs.     • Cholecalciferol (VITAMIN D3) 5000 units capsule capsule Take 5,000 Units by mouth Every Other Day. Takes Monday Wednesday Friday     • clopidogrel (PLAVIX) 75 MG tablet TAKE ONE TABLET BY MOUTH DAILY 30 tablet 11   • ferrous sulfate 325 (65 FE) MG tablet One tablet BID 60 tablet 3   • folic acid (FOLVITE) 1 MG tablet TAKE ONE TABLET BY MOUTH DAILY 90 tablet 3   • furosemide (LASIX) 40 MG tablet TAKE ONE-HALF TABLET BY MOUTH DAILY 30 tablet 2   • labetalol (NORMODYNE) 200 MG tablet TAKE ONE TABLET BY MOUTH TWICE A DAY 60 tablet 11   • lidocaine (LIDODERM) 5 % Place 1 patch on the skin Daily. Remove & Discard patch within 12 hours or as directed by MD 30 patch 0   • pantoprazole (PROTONIX) 40 MG EC tablet        No current facility-administered medications for this visit.          OBJECTIVE    /60   Pulse 74   Ht 177.8 cm (70\")   Wt 81.2 kg (179 lb)   BMI 25.68 kg/m²         Review of Systems     Constitutional:  Denies recent weight loss, weight gain, fever or chills, no change in exercise tolerance     HENT:  Denies any hearing loss, epistaxis, hoarseness, or difficulty speaking.     Eyes: Wears eyeglasses or contact lenses     Respiratory:  Denies dyspnea with exertion,no cough, wheezing, or hemoptysis.     Cardiovascular: Negative for palpations, chest pain, orthopnea, PND, peripheral edema, syncope, or claudication.     Gastrointestinal:  Denies change in bowel habits, dyspepsia, ulcer disease, hematochezia, or melena.     Endocrine: Negative for cold intolerance, heat intolerance, polydipsia, polyphagia and polyuria.     Genitourinary: Negative.      Musculoskeletal: Denies any history of arthritic symptoms or back " problems     Skin:  Denies any change in hair or nails, rashes, or skin lesions.     Allergic/Immunologic: Negative.  Negative for environmental allergies, food allergies and immunocompromised state.     Neurological:  Denies any history of recurrent headaches, strokes, TIA, or seizure disorder.     Hematological: Denies any food allergies, seasonal allergies, bleeding disorders, or lymphadenopathy.     Psychiatric/Behavioral: Denies any history of depression, substance abuse, or change in cognitive function.         Physical Exam     Constitutional: Cooperative, alert and oriented,  in no acute distress.     HENT:   Head: Normocephalic, normal hair patterns, no masses or tenderness.  Ears, Nose, and Throat: No gross abnormalities. No pallor or cyanosis.   Eyes: EOMS intact, PERRL, conjunctivae and lids unremarkable. Fundoscopic exam and visual fields not performed.   Neck: No palpable masses or adenopathy, no thyromegaly, no JVD, carotid pulses are full and equal bilaterally and without  Bruits.     Cardiovascular: Regular rhythm, S1 and S2 normal, no S3 or S4.  No murmurs, gallops, or rubs detected.     Pulmonary/Chest: Chest: normal symmetry,  normal respiratory excursion, no intercostal retraction, no use of accessory muscles.            Pulmonary: Normal breath sounds. No rales or ronchi.    Abdominal: Abdomen soft, bowel sounds normoactive, no masses, no hepatosplenomegaly, non-tender, no bruits.     Musculoskeletal: No deformities, clubbing, cyanosis, erythema, or edema observed.     Neurological: No gross motor or sensory deficits noted, affect appropriate, oriented to time, person, place.     Skin: Warm and dry to the touch, no apparent skin lesions or masses noted.     Psychiatric: He has a normal mood and affect. His behavior is normal. Judgment and thought content normal.           ECG 12 Lead  Date/Time: 7/30/2018 8:34 PM  Performed by: TARIK NOBLE  Authorized by: TARIK NOBLE    Comparison: not compared with previous ECG   Rhythm: sinus rhythm  Rate: normal  Conduction: right bundle branch block and LAFB  QRS axis: left  Comments: Sinus rhythm with a right bundle branch block, left axis deviation, possible old inferior wall MI and old anteroseptal MI              Lab Results   Component Value Date    WBC 11.07 (H) 01/09/2018    HGB 12.6 (L) 01/09/2018    HCT 38.3 (L) 01/09/2018    MCV 86.7 01/09/2018     01/09/2018     Lab Results   Component Value Date    GLUCOSE 96 05/16/2018    BUN 12 05/16/2018    CREATININE 0.97 05/16/2018    EGFRIFNONA 78 05/16/2018    BCR 12.4 05/16/2018    CO2 30.0 05/16/2018    CALCIUM 9.4 05/16/2018    ALBUMIN 4.20 05/16/2018    AST 13 (L) 05/16/2018    ALT 24 05/16/2018     No results found for: CHOL  No results found for: TRIG  No results found for: HDL  No components found for: LDLCALC  No results found for: LDL  No results found for: HDLLDLRATIO  No components found for: CHOLHDL  No results found for: HGBA1C  No results found for: TSH, M4LCLQN, O6QUZTJ, THYROIDAB        ASSESSMENT AND PLAN  Mr. Pruitt is stable with no clinical evidence of progression of coronary artery disease.  Antiplatelet therapy with aspirin, Plavix, antihypertensive therapy with labetalol, amlodipine have been continued.  Lab tests indicated below have been ordered.    Juanito was seen today for follow-up.    Diagnoses and all orders for this visit:    Mixed hyperlipidemia  -     Lipid Panel; Future    Secondary hypertension    Coronary artery disease involving native coronary artery of native heart without angina pectoris  -     Lipid Panel; Future        Eber Thompson MD  7/30/2018  8:34 PM

## 2018-09-08 DIAGNOSIS — I73.9 PVD (PERIPHERAL VASCULAR DISEASE) (HCC): ICD-10-CM

## 2018-09-10 RX ORDER — CLOPIDOGREL BISULFATE 75 MG/1
TABLET ORAL
Qty: 30 TABLET | Refills: 10 | Status: SHIPPED | OUTPATIENT
Start: 2018-09-10 | End: 2019-06-16 | Stop reason: SDUPTHER

## 2018-09-18 ENCOUNTER — OFFICE VISIT (OUTPATIENT)
Dept: SLEEP MEDICINE | Facility: HOSPITAL | Age: 63
End: 2018-09-18

## 2018-09-18 VITALS
HEIGHT: 70 IN | WEIGHT: 177 LBS | DIASTOLIC BLOOD PRESSURE: 67 MMHG | SYSTOLIC BLOOD PRESSURE: 129 MMHG | BODY MASS INDEX: 25.34 KG/M2

## 2018-09-18 DIAGNOSIS — G47.33 OSA (OBSTRUCTIVE SLEEP APNEA): Primary | ICD-10-CM

## 2018-09-18 DIAGNOSIS — G47.33 OBSTRUCTIVE SLEEP APNEA, ADULT: ICD-10-CM

## 2018-09-18 PROCEDURE — 99212 OFFICE O/P EST SF 10 MIN: CPT | Performed by: INTERNAL MEDICINE

## 2018-09-18 NOTE — PROGRESS NOTES
Sleep Clinic Follow Up    Date: 9/18/2018  Primary Care Physician: Preston Dalton MD      Interim History (1/3):  Since the last visit on 09/19/2017, patient has:      1)  JUAN - Has remained compliant with CPAP. He denies mask dry mouth, headaches, pressures intolerance, or non-compliance. He denies abnormal dreams, sleep paralysis, nasal congestion, URI sx. His machine is not functioning well all the time.     PAP Data:    Time frame: 02/18/2018 - 09/17/2018   Compliance 100 %  Average use on days used: 8hrs 13 min  Percent of days with usage greater than or equal to 4 hours: 100%  PAP range : 12 cm H2O  Average 90% pressure: 12 cmH2O  Leak: 3 minutes  Average AHI 1.3 events/hr  Mask type: FFM  DME: [facility]    Bed time: 2334-0057  Sleep latency: 5 minutes  Number of times awakens during the night: 3-5  Wake time: 2290-5470  Estimated total sleep time at night: 7-8 hours  Caffeine intake: 16oz of coffee, 64oz of tea, and 0oz of soda  Alcohol intake: 0 drinks per week  Nap time: none   Sleepiness with Driving: none    2) Patient denies RLS symptoms.     PMHx, FH, SH reviewed and pertinent changes are: unchanged from last office visit on 09/19/2017      REVIEW OF SYSTEMS:   Negative for chest pain, fever, chills, SOA, abdominal pain. Smoking: none      Exam (6-11/12):    Vitals:    09/18/18 1528   BP: 129/67     Body mass index is 25.4 kg/m². Patient's Body mass index is 25.4 kg/m². BMI is above normal parameters. Recommendations include: referral to primary care.      Gen:  No distress, conversant, pleasant, appears stated age, alert, oriented  Eyes:   Anicteric sclera, moist conjunctiva, no lid lag     PERRLA, EOMI   Heent:   NC/AT    Oropharynx clear, Mallampati 4    normal hearing  Lungs:  Normal effort, non-labored breathing    Clear to auscultation    CV:  Normal S1/S2, no murmur    no lower extremity edema  ABD:  Soft, normal bowel sounds    Psych:  Appropriate affect  Neuro:  CN 2-12  intact    Past Medical History:   Diagnosis Date   • Allergic rhinitis    • Atherosclerosis of native arteries of the extremities with ulceration (CMS/HCC)     Bilateral legs   • Benign hypertension    • Carotid artery stenosis    • Coagulopathy (CMS/HCC)     on plavix and asa (instructed to continue per md)   • Coronary atherosclerosis    • Essential hypertension    • Hypercholesterolemia    • Inguinal pain     small right groin seroma   • Malignant neoplasm of lung (CMS/HCC)     Right upper lobe mass suspicious for lung cancer   • JUAN on CPAP    • PVD (peripheral vascular disease) (CMS/HCC)    • Renal artery stenosis (CMS/HCC)    • Simple chronic bronchitis (CMS/HCC)    • Sleep apnea    • SOB (shortness of breath)    • Squamous cell carcinoma of lung (CMS/HCC)        Current Outpatient Prescriptions:   •  albuterol (PROVENTIL HFA;VENTOLIN HFA) 108 (90 BASE) MCG/ACT inhaler, Inhale 2 puffs every 4 (four) hours as needed for wheezing., Disp: , Rfl:   •  amLODIPine (NORVASC) 10 MG tablet, TAKE ONE TABLET BY MOUTH DAILY, Disp: 30 tablet, Rfl: 5  •  aspirin 81 MG EC tablet, Take 81 mg by mouth Daily. Instructed to cont taking, Disp: , Rfl:   •  budesonide-formoterol (SYMBICORT) 80-4.5 MCG/ACT inhaler, Inhale 2 puffs., Disp: , Rfl:   •  Cholecalciferol (VITAMIN D3) 5000 units capsule capsule, Take 5,000 Units by mouth Every Other Day. Takes Monday Wednesday Friday, Disp: , Rfl:   •  clopidogrel (PLAVIX) 75 MG tablet, TAKE ONE TABLET BY MOUTH DAILY, Disp: 30 tablet, Rfl: 10  •  ferrous sulfate 325 (65 FE) MG tablet, One tablet BID, Disp: 60 tablet, Rfl: 3  •  furosemide (LASIX) 40 MG tablet, TAKE ONE-HALF TABLET BY MOUTH DAILY, Disp: 30 tablet, Rfl: 2  •  labetalol (NORMODYNE) 200 MG tablet, TAKE ONE TABLET BY MOUTH TWICE A DAY, Disp: 60 tablet, Rfl: 11  •  lidocaine (LIDODERM) 5 %, Place 1 patch on the skin Daily. Remove & Discard patch within 12 hours or as directed by MD, Disp: 30 patch, Rfl: 0  •  pantoprazole  (PROTONIX) 40 MG EC tablet, , Disp: , Rfl:     Sleep Testin. Currently on 12 cm H2O    ASSESSMENT / PLAN:     1. Obstructive sleep apnea  1. Continue PAP as prescribed.   2. Script for autocpap 12-20 and PAP supplies  3. Return to clinic in 31-90 days with compliance check unless sx change in the interim period.    Total time 15 min, more than half spent in face to face counseling and coordination of care.    RTC in 12 months     This document has been electronically signed by Jorge Craig MD on 2018         CC: Preston Dalton MD          No ref. provider found

## 2018-09-24 RX ORDER — AMLODIPINE BESYLATE 10 MG/1
TABLET ORAL
Qty: 30 TABLET | Refills: 4 | Status: SHIPPED | OUTPATIENT
Start: 2018-09-24 | End: 2019-02-26 | Stop reason: SDUPTHER

## 2018-11-07 ENCOUNTER — TRANSCRIBE ORDERS (OUTPATIENT)
Dept: LAB | Facility: HOSPITAL | Age: 63
End: 2018-11-07

## 2018-11-07 DIAGNOSIS — K92.2 GASTROINTESTINAL HEMORRHAGE, UNSPECIFIED GASTROINTESTINAL HEMORRHAGE TYPE: ICD-10-CM

## 2018-11-07 DIAGNOSIS — R19.5 OTHER FECAL ABNORMALITIES: ICD-10-CM

## 2018-11-07 DIAGNOSIS — D50.0 IRON DEFICIENCY ANEMIA SECONDARY TO BLOOD LOSS (CHRONIC): Primary | ICD-10-CM

## 2018-11-07 DIAGNOSIS — K55.20 ARTERIOVENOUS MALFORMATION OF DIGESTIVE SYSTEM VESSEL: ICD-10-CM

## 2018-11-26 RX ORDER — LABETALOL 200 MG/1
TABLET, FILM COATED ORAL
Qty: 60 TABLET | Refills: 10 | Status: SHIPPED | OUTPATIENT
Start: 2018-11-26 | End: 2019-10-28 | Stop reason: SDUPTHER

## 2018-11-30 ENCOUNTER — HOSPITAL ENCOUNTER (OUTPATIENT)
Dept: CT IMAGING | Facility: HOSPITAL | Age: 63
Discharge: HOME | End: 2018-11-30
Admitting: THORACIC SURGERY (CARDIOTHORACIC VASCULAR SURGERY)

## 2018-11-30 DIAGNOSIS — C34.91 SQUAMOUS CELL CARCINOMA OF RIGHT LUNG (HCC): ICD-10-CM

## 2018-11-30 PROCEDURE — 71250 CT THORAX DX C-: CPT

## 2018-12-03 RX ORDER — FUROSEMIDE 40 MG/1
TABLET ORAL
Qty: 30 TABLET | Refills: 1 | Status: SHIPPED | OUTPATIENT
Start: 2018-12-03 | End: 2019-04-05 | Stop reason: SDUPTHER

## 2018-12-10 ENCOUNTER — OFFICE VISIT (OUTPATIENT)
Dept: CARDIAC SURGERY | Facility: CLINIC | Age: 63
End: 2018-12-10

## 2018-12-10 VITALS
OXYGEN SATURATION: 96 % | HEIGHT: 70 IN | WEIGHT: 177 LBS | HEART RATE: 78 BPM | DIASTOLIC BLOOD PRESSURE: 75 MMHG | SYSTOLIC BLOOD PRESSURE: 130 MMHG | BODY MASS INDEX: 25.34 KG/M2 | TEMPERATURE: 98.8 F

## 2018-12-10 DIAGNOSIS — E78.2 MIXED HYPERLIPIDEMIA: ICD-10-CM

## 2018-12-10 DIAGNOSIS — I25.10 CORONARY ARTERY DISEASE INVOLVING NATIVE CORONARY ARTERY OF NATIVE HEART WITHOUT ANGINA PECTORIS: ICD-10-CM

## 2018-12-10 DIAGNOSIS — I73.9 PVD (PERIPHERAL VASCULAR DISEASE) (HCC): ICD-10-CM

## 2018-12-10 DIAGNOSIS — I70.1 RENAL ARTERY STENOSIS (HCC): ICD-10-CM

## 2018-12-10 DIAGNOSIS — C34.91 SQUAMOUS CELL CARCINOMA OF RIGHT LUNG (HCC): Primary | ICD-10-CM

## 2018-12-10 DIAGNOSIS — I65.23 BILATERAL CAROTID ARTERY STENOSIS: ICD-10-CM

## 2018-12-10 DIAGNOSIS — I10 ESSENTIAL HYPERTENSION: ICD-10-CM

## 2018-12-10 PROCEDURE — 99214 OFFICE O/P EST MOD 30 MIN: CPT | Performed by: THORACIC SURGERY (CARDIOTHORACIC VASCULAR SURGERY)

## 2018-12-17 ENCOUNTER — OFFICE VISIT (OUTPATIENT)
Dept: SLEEP MEDICINE | Facility: HOSPITAL | Age: 63
End: 2018-12-17

## 2018-12-17 VITALS
OXYGEN SATURATION: 96 % | DIASTOLIC BLOOD PRESSURE: 64 MMHG | WEIGHT: 176.2 LBS | SYSTOLIC BLOOD PRESSURE: 119 MMHG | HEART RATE: 55 BPM | BODY MASS INDEX: 25.28 KG/M2

## 2018-12-17 DIAGNOSIS — G47.33 OBSTRUCTIVE SLEEP APNEA, ADULT: Primary | ICD-10-CM

## 2018-12-17 PROCEDURE — 99213 OFFICE O/P EST LOW 20 MIN: CPT | Performed by: INTERNAL MEDICINE

## 2018-12-17 NOTE — PROGRESS NOTES
12/10/2018    Juanito Pruitt  1955    Chief Complaint:    Chief Complaint   Patient presents with   • Carotid Artery Disease     6 month follow up    • Lung Nodule       HPI:      PCP:  Preston Dalton MD  Cardiology:  Dr Thompson  GI:  Dr Moya  Hematology:  Dr Randolph     63yr man with PVD, Carotid Stenosis, CAD, HTN, dyslipidemia, GERD.   RIGHT groin pain resolved.  progressive anemia, workup in progress.  Carotid stenosis requires treatment.  No hemoptysis, cough, wt loss.  Claudication None, No rest pain, No ischemic tissue loss  No amaurosis fugax, No TIA, No stroke  No other associated signs, symptoms or modifying factors.     12/2011:  bilateral renal artery stents  4/20/2012 JUAN:  RIGHT 1.1 triphasic, LEFT 1.1 triphasic  6/7/2012 Exercise JUAN:  RIGHT .89 to .69, LEFT 1.0 to .87  4/25/2014 US RIGHT groin:  [id number] fluid collection deep to femoral vessels.  5/13/2014 US drainage seroma:  10ml serous fluid, no residual seroma.  5/20/2014 US RIGHT groin:  5x2x0.5cm fluid collection deep to femoral vessels.  7/21/2014 Renal Artery Duplex:  RIGHT maxPSV 222cm/s,RI .87, RAR 2.8.  LEFT maxPSV 271cm/s, RI .88, RAR 3.4.  patent bilateral renal artery stents.     9/12/2012 Carotid Duplex:  ARIC 70-75% antegrade.  LICA <50% antegrade (maxPSV 96cm/s).  9/17/2012 CTA Carotids:  ARIC 60% small ulcerative plaque.  LICA mild irregularity.  10/11/2013 CTA Carotids:  ARIC 85%, LICA mild irregularity  12/3/13 RIGHT CEA   1/13/2014 Carotid Duplex:  RAIC 0-15%  7/21/2014 Carotid Duplex:  ARIC 0-15% antegrade.  LICA 50-79% antegrade (maxPSV 145cm/s).  1/26/2015 Carotid Duplex:  ARIC 0-15% antegrade.  LICA 50-79% antegrade (maxPSV 147cm/s).  2/1/2016  Carotid Duplex:  ARIC 0-15% antegrade.  LICA 50-79% antegrade ([id number] cm/s, ratio 2.5) .   2/25/2016 CTA Carotids:  ARIC no significant disease, LICA 75-80%.  3/23/17  LEFT CEA          5/04/17  Carotid Duplex:  LICA 0-45% antegrade        11/07/17  Carotid  Duplex:  ARIC 0-49% maxPSV 91 ratio 1.3 antegrade.  LICA 0-49% % antegrade [id number] cm/s, ratio 1.4         18  Carotid Duplex:  ARIC 0-49% maxPSV 81 ratio 1.0  LICA 0-49% % antegrade [id number] cm/s, ratio 1.6  Vertebrals antegrade        2018 Carotid Duplex:  ARIC 0-49% (74/26cm/s, ratio 1.4)  LICA 0-49%  antegrade 104/34cm/s, ratio 0.9)  Vertebrals antegrade     2011 CXR:  LEFT lower lobe atelectasis.  2016 CXR:  RIGHT paratracheal mass, medial segment atelectasis.  2016 PET CT:  RIGHT upper lobe post obstructive atelectasis.  mass obstructing upper lobe bronchus SUV 10.  no significant adenopathy.  liver, adrenals ok.  2016 PFT:  FVC 4.2 (95%), FEV1 1.7 (47%), DLCO 48%  2016 AB.42/41/57/26/90%  2016 Quantitative lung perfusion scan:  RIGHT 31%, LEFT 69% (predicted post pneumonectomy FEV1 1100ml, adequate for resection)  2016  Right thoracotomy with Carinal Pneumonectomy, Thoracic & Mediastinal Lymphadenetomy, Fiberoptic Bronchoscopy.    PATH:  FINAL  RIGHT LUNG: SQUAMOUS CARCINOMA, MODERATELY DIFFERENTIATED OF UPPER LOBE BRONCHUS (4.5 CM IN GREATEST DIMENSION).  THE NEOPLASM IS 0.5 CM AWAY FROM THE BRONCHIAL MARGIN SUBPLEURAL ABSCESS DISTAL TO THE TUMOR. NO EVIDENCE OF ANGIOLYMPHATIC INVOLVEMENT.  All lymph nodes negative.  2016  Chest xray:  expected postoperative changes following right pneumonectomy. Left lung appears clear. There is a displaced right lateral sixth rib fracture it appears to be acute.   2016  Chest xray:  Old healed left rib fractures noted. Displaced right sixth rib fracture unchanged in appearance. CONCLUSION- No significant change in near complete opacification of the right hemithorax. Left lung remains clear.   2016  Chest xray:  consistent with postpneumonectomy changes. There is again noted a displaced right lateral sixth rib fracture. The patient is status post median sternotomy. Emphysema is noted in the left lung. The left lung is  otherwise clear. CONCLUSION- No acute disease.   2/2017 CT Chest:  No new nodules.  Possible RIGHT paratracheal lymph node vs postop change.  Liver adrenal ok.  5/2018 CT Chest:  No new nodules,5mm GGO LEFT lower lobe.  .  no change RIGHT paratracheal lymph node.  Liver adrenals ok.  12/2018 CT Chest:  No new nodules, 5mm GGO LEFT lower lobe.  no change RIGHT paratracheal lymph node.  Liver adrenals ok.    The following portions of the patient's history were reviewed and updated as appropriate: allergies, current medications, past family history, past medical history, past social history, past surgical history and problem list.  Recent images independently reviewed.  Available laboratory values reviewed.    PMH:  Past Medical History:   Diagnosis Date   • Allergic rhinitis    • Atherosclerosis of native arteries of the extremities with ulceration (CMS/HCC)     Bilateral legs   • Benign hypertension    • Carotid artery stenosis    • Coagulopathy (CMS/HCC)     on plavix and asa (instructed to continue per md)   • Coronary atherosclerosis    • Essential hypertension    • Hypercholesterolemia    • Inguinal pain     small right groin seroma   • Malignant neoplasm of lung (CMS/HCC)     Right upper lobe mass suspicious for lung cancer   • JUAN on CPAP    • PVD (peripheral vascular disease) (CMS/HCC)    • Renal artery stenosis (CMS/HCC)    • Simple chronic bronchitis (CMS/HCC)    • Sleep apnea    • SOB (shortness of breath)    • Squamous cell carcinoma of lung (CMS/HCC)      Family History   Problem Relation Age of Onset   • Heart disease Mother    • Other Mother         ischemic heart disease   • Hypertension Mother    • Heart disease Father    • Other Father         ischemic heart disease   • Hypertension Father    • Coronary artery disease Other    • Cancer Sister      Social History     Tobacco Use   • Smoking status: Former Smoker     Years: 35.00   • Smokeless tobacco: Never Used   Substance Use Topics   • Alcohol use:  No   • Drug use: No       ALLERGIES:  Allergies   Allergen Reactions   • Penicillins Itching     Rash,itching         MEDICATIONS:    Current Outpatient Medications:   •  albuterol (PROVENTIL HFA;VENTOLIN HFA) 108 (90 BASE) MCG/ACT inhaler, Inhale 2 puffs every 4 (four) hours as needed for wheezing., Disp: , Rfl:   •  amLODIPine (NORVASC) 10 MG tablet, TAKE ONE TABLET BY MOUTH DAILY, Disp: 30 tablet, Rfl: 4  •  aspirin 81 MG EC tablet, Take 81 mg by mouth Daily. Instructed to cont taking, Disp: , Rfl:   •  budesonide-formoterol (SYMBICORT) 80-4.5 MCG/ACT inhaler, Inhale 2 puffs., Disp: , Rfl:   •  Cholecalciferol (VITAMIN D3) 5000 units capsule capsule, Take 5,000 Units by mouth Every Other Day. Takes Monday Wednesday Friday, Disp: , Rfl:   •  clopidogrel (PLAVIX) 75 MG tablet, TAKE ONE TABLET BY MOUTH DAILY, Disp: 30 tablet, Rfl: 10  •  ferrous sulfate 325 (65 FE) MG tablet, One tablet BID, Disp: 60 tablet, Rfl: 3  •  furosemide (LASIX) 40 MG tablet, TAKE ONE-HALF TABLET BY MOUTH DAILY, Disp: 30 tablet, Rfl: 1  •  labetalol (NORMODYNE) 200 MG tablet, TAKE ONE TABLET BY MOUTH TWICE A DAY, Disp: 60 tablet, Rfl: 10  •  lidocaine (LIDODERM) 5 %, Place 1 patch on the skin Daily. Remove & Discard patch within 12 hours or as directed by MD, Disp: 30 patch, Rfl: 0  •  pantoprazole (PROTONIX) 40 MG EC tablet, , Disp: , Rfl:     Review of Systems   Review of Systems   Constitution: Positive for malaise/fatigue. Negative for weakness and weight loss.   Cardiovascular: Positive for dyspnea on exertion. Negative for chest pain and claudication.   Respiratory: Negative for cough and shortness of breath.    Skin: Negative for color change and poor wound healing.   Musculoskeletal: Positive for back pain.   Neurological: Negative for dizziness and numbness.       Physical Exam   Physical Exam   Constitutional: He is oriented to person, place, and time. He is active and cooperative. He does not appear ill. No distress.   HENT:    Right Ear: Hearing normal.   Left Ear: Hearing normal.   Nose: No nasal deformity. No epistaxis.   Mouth/Throat: He does not have dentures. Normal dentition.   Cardiovascular: Normal rate and regular rhythm.   No murmur heard.  Pulses:       Carotid pulses are 2+ on the right side with bruit, and 2+ on the left side.       Radial pulses are 2+ on the right side, and 2+ on the left side.        Dorsalis pedis pulses are 2+ on the right side, and 2+ on the left side.        Posterior tibial pulses are 2+ on the right side, and 2+ on the left side.   Pulmonary/Chest: Effort normal and breath sounds normal.   Abdominal: Soft. He exhibits no distension and no mass. There is no tenderness.   Musculoskeletal: He exhibits no deformity.   Gait normal.    Neurological: He is alert and oriented to person, place, and time. He has normal strength.   Skin: Skin is warm and dry. No cyanosis or erythema. No pallor.   No venous staining   Psychiatric: He has a normal mood and affect. His speech is normal. Judgment and thought content normal.     Results for JORGE PRUITT (MRN [id number]) as of 12/17/2018 09:45  GFR 78 Ref. Range 5/16/2018 14:58   Creatinine Latest Ref Range: 0.70 - 1.30 mg/dL 0.97   BUN Latest Ref Range: 7 - 21 mg/dL 12     ASSESSMENT:  Jorge was seen today for carotid artery disease and lung nodule.    Diagnoses and all orders for this visit:    Squamous cell carcinoma of right lung (CMS/HCC)  -     CT Chest Without Contrast; Future    Essential hypertension    Mixed hyperlipidemia    PVD (peripheral vascular disease) (CMS/HCC)    Renal artery stenosis (CMS/HCC)    Coronary artery disease involving native coronary artery of native heart without angina pectoris    Bilateral carotid artery stenosis    PLAN:  Detailed discussion with Jorge Pruitt regarding situation and options.  Stable lung cancer, no evidence recurrence or new primary.  Stable carotid stenosis.  Multiple risk factors with severe  comorbidities.  No intervention indicated at this time.  Will follow with interval imaging.  Risks, benefits discussed.  Understands and wishes to proceed with plan.     Return in 6 months with  CT Chest without contrast    Return after above studies complete  Recommended regular physical activity, progressive walking program.  Continue current medications as directed.    Thank you for the opportunity to participate in this patient's care.    Copy to primary care provider.    EMR Dragon/Transcription disclaimer:   Much of this encounter note is an electronic transcription/translation of spoken language to printed text. The electronic translation of spoken language may permit erroneous, or at times, nonsensical words or phrases to be inadvertently transcribed; Although I have reviewed the note for such errors, some may still exist.

## 2018-12-17 NOTE — PROGRESS NOTES
Sleep Clinic Follow Up    Date: 2018  Primary Care Physician: Preston Dalton MD    Last office visit: 2018 (I reviewed this note)    CC: Follow up: JUAN    Sleep Testin. Currently on 12-20 cm H2O      Assessment and Plan:    1. Obstructive sleep apnea Established, stable (1)  1. Compliant and improved with PAP therapy  2. Continue PAP as prescribed.   3. Script for PAP supplies    Interim History (1-3/4):  Since the last visit:    1) JUAN -  Juanito Pruitt has remained compliant with CPAP. He denies mask and machine issues, dry mouth, headaches, or pressures intolerance. He denies abnormal dreams, sleep paralysis, nasal congestion, URI sx.    PAP Data:    Time frame: 10/19/2018 - 2018   Compliance 100 %  Average use on days used: 8hrs 45 min  Percent of days with usage greater than or equal to 4 hours: 98.2%  PAP range : 12-20 cm H2O  Average 90% pressure: 12.4 cmH2O  Leak: < 1 minutes  Average AHI 0.8 events/hr  Mask type: Full face mask  DME: [employer]    Bed time: 1900  Sleep latency: 5 minutes  Number of times awakens during the night: 2-3  Wake time: 0430  Estimated total sleep time at night: 8 hours  Caffeine intake: 16oz of coffee, 64oz of tea, and 0oz of soda  Alcohol intake: 0 drinks per week  Nap time: none   Sleepiness with Driving: none    2) Patient denies RLS symptoms.     PMHx, FH, SH reviewed and pertinent changes are: unchanged from last office visit on 2018      REVIEW OF SYSTEMS:   Negative for chest pain, fever, cough, SOA, abdominal pain. Smoking:none    Exam ():  Vitals:    18 0924   BP: 119/64   Pulse: 55   SpO2: 96%     Body mass index is 25.28 kg/m². Patient's Body mass index is 25.28 kg/m². BMI is within normal parameters. No follow-up required.      Gen:  No acute distress, alert, oriented  Lungs:  CTA with normal effort   CV:  RRR, no M/R/G  GI:  soft, non-tender  Psych:  Appropriate affect    Past Medical History:   Diagnosis Date    • Allergic rhinitis    • Atherosclerosis of native arteries of the extremities with ulceration (CMS/HCC)     Bilateral legs   • Benign hypertension    • Carotid artery stenosis    • Coagulopathy (CMS/HCC)     on plavix and asa (instructed to continue per md)   • Coronary atherosclerosis    • Essential hypertension    • Hypercholesterolemia    • Inguinal pain     small right groin seroma   • Malignant neoplasm of lung (CMS/HCC)     Right upper lobe mass suspicious for lung cancer   • JUAN on CPAP    • PVD (peripheral vascular disease) (CMS/HCC)    • Renal artery stenosis (CMS/HCC)    • Simple chronic bronchitis (CMS/HCC)    • Sleep apnea    • SOB (shortness of breath)    • Squamous cell carcinoma of lung (CMS/HCC)        Current Outpatient Medications:   •  albuterol (PROVENTIL HFA;VENTOLIN HFA) 108 (90 BASE) MCG/ACT inhaler, Inhale 2 puffs every 4 (four) hours as needed for wheezing., Disp: , Rfl:   •  amLODIPine (NORVASC) 10 MG tablet, TAKE ONE TABLET BY MOUTH DAILY, Disp: 30 tablet, Rfl: 4  •  aspirin 81 MG EC tablet, Take 81 mg by mouth Daily. Instructed to cont taking, Disp: , Rfl:   •  budesonide-formoterol (SYMBICORT) 80-4.5 MCG/ACT inhaler, Inhale 2 puffs., Disp: , Rfl:   •  Cholecalciferol (VITAMIN D3) 5000 units capsule capsule, Take 5,000 Units by mouth Every Other Day. Takes Monday Wednesday Friday, Disp: , Rfl:   •  clopidogrel (PLAVIX) 75 MG tablet, TAKE ONE TABLET BY MOUTH DAILY, Disp: 30 tablet, Rfl: 10  •  furosemide (LASIX) 40 MG tablet, TAKE ONE-HALF TABLET BY MOUTH DAILY, Disp: 30 tablet, Rfl: 1  •  labetalol (NORMODYNE) 200 MG tablet, TAKE ONE TABLET BY MOUTH TWICE A DAY, Disp: 60 tablet, Rfl: 10  •  lidocaine (LIDODERM) 5 %, Place 1 patch on the skin Daily. Remove & Discard patch within 12 hours or as directed by MD, Disp: 30 patch, Rfl: 0  •  pantoprazole (PROTONIX) 40 MG EC tablet, , Disp: , Rfl:   •  ferrous sulfate 325 (65 FE) MG tablet, One tablet BID, Disp: 60 tablet, Rfl: 3    Total time  15 min, more than half spent in face to face counseling and coordination of care.    RTC in 9 months     This document has been electronically signed by Jorge Craig MD on December 17, 2018         CC: Preston Dalton MD          No ref. provider found

## 2019-01-31 ENCOUNTER — LAB (OUTPATIENT)
Dept: LAB | Facility: HOSPITAL | Age: 64
End: 2019-01-31

## 2019-01-31 ENCOUNTER — OFFICE VISIT (OUTPATIENT)
Dept: CARDIOLOGY | Facility: CLINIC | Age: 64
End: 2019-01-31

## 2019-01-31 VITALS
OXYGEN SATURATION: 98 % | WEIGHT: 176 LBS | DIASTOLIC BLOOD PRESSURE: 62 MMHG | SYSTOLIC BLOOD PRESSURE: 138 MMHG | HEART RATE: 55 BPM | BODY MASS INDEX: 25.2 KG/M2 | HEIGHT: 70 IN

## 2019-01-31 DIAGNOSIS — I15.9 SECONDARY HYPERTENSION: Primary | ICD-10-CM

## 2019-01-31 DIAGNOSIS — I10 ESSENTIAL HYPERTENSION: ICD-10-CM

## 2019-01-31 DIAGNOSIS — E78.2 MIXED HYPERLIPIDEMIA: ICD-10-CM

## 2019-01-31 DIAGNOSIS — I25.10 CORONARY ARTERY DISEASE INVOLVING NATIVE CORONARY ARTERY OF NATIVE HEART WITHOUT ANGINA PECTORIS: ICD-10-CM

## 2019-01-31 LAB
ARTICHOKE IGE QN: 70 MG/DL (ref 1–129)
CHOLEST SERPL-MCNC: 129 MG/DL (ref 0–199)
HDLC SERPL-MCNC: 40 MG/DL (ref 60–200)
LDLC/HDLC SERPL: 1.55 {RATIO} (ref 0–3.55)
TRIGL SERPL-MCNC: 136 MG/DL (ref 20–199)

## 2019-01-31 PROCEDURE — 80061 LIPID PANEL: CPT

## 2019-01-31 PROCEDURE — 36415 COLL VENOUS BLD VENIPUNCTURE: CPT

## 2019-01-31 PROCEDURE — 99214 OFFICE O/P EST MOD 30 MIN: CPT | Performed by: INTERNAL MEDICINE

## 2019-01-31 NOTE — PROGRESS NOTES
Juanito Pruitt  63 y.o. male    01/31/2019  1. Secondary hypertension    2. Mixed hyperlipidemia    3. Essential hypertension    4. Coronary artery disease involving native coronary artery of native heart without angina pectoris        History of Present Illness    Mr. Pruitt is here for follow-up of his above stated problems.  He denied any chest pain, shortness of breath, palpitation, dizziness or syncope.  His blood pressures in the normal range.  He follows up with Dr. Ray on a regular basis.  His pulmonary nodule and peripheral vascular disease both of which I understand is stable.  Clinical exam today did not reveal any signs of congestive heart failure.  His CABG was in 2011.  CT scan of the chest in November 2018 showed:    CONCLUSION: Status post right-sided pneumonectomy. Stable  appearing solitary right-sided peritracheal lymph node without  change since prior exam. Stable appearing dilatation of the  esophagus with air-fluid level suggesting reflux. No significant  changes allowing for differences in technique since prior exam    SUBJECTIVE    Allergies   Allergen Reactions   • Penicillins Itching     Rash,itching         Past Medical History:   Diagnosis Date   • Allergic rhinitis    • Atherosclerosis of native arteries of the extremities with ulceration (CMS/HCC)     Bilateral legs   • Benign hypertension    • Carotid artery stenosis    • Coagulopathy (CMS/HCC)     on plavix and asa (instructed to continue per md)   • Coronary atherosclerosis    • Essential hypertension    • Hypercholesterolemia    • Inguinal pain     small right groin seroma   • Malignant neoplasm of lung (CMS/HCC)     Right upper lobe mass suspicious for lung cancer   • JUAN on CPAP    • PVD (peripheral vascular disease) (CMS/HCC)    • Renal artery stenosis (CMS/HCC)    • Simple chronic bronchitis (CMS/HCC)    • Sleep apnea    • SOB (shortness of breath)    • Squamous cell carcinoma of lung (CMS/HCC)          Past Surgical  History:   Procedure Laterality Date   • CARDIAC CATHETERIZATION  07/12/2011    Multi-vessel coronary artery disease with critical lesion noted in the LAD coronary artery, left circumflex coronary artery and right coronary artery as described above. The LAD appeared to be chronically occluded    • CAROTID ENDARTERECTOMY  12/03/2013    Right carotid endarterectomy. Carotid cerebral arteriogram   • CORONARY ARTERY BYPASS GRAFT  07/14/2011    LIMA->LAD SVG->OM2 SVG->OM3 SVG->steve branch   • ESOPHAGOSCOPY / EGD  12/06/2011    With tube-Normal hypopharynx, GE junction, Duodenum, symmetrical & patent pylorus. Tongue of columnar epithelium that could be a Ivy's esophagus present. Multiple biopsies performed. Chronic gastritis in antrum. Biopsy taken   • HIP SURGERY  01/18/2016    Right total hip arthroplasty anterior approach.   • LUNG REMOVAL, TOTAL  07/13/2016    Right thoracotomy with Carinal Pneumonectomy, Thoracic & Mediastinal Lymphadenetomy, Fiberoptic Bronchoscopy   • NH THROMBOENDARTECTMY NECK,NECK INCIS Left 3/14/2017    Procedure: CAROTID ENDARTERECTOMY, ARTERIOGRAM ;  Surgeon: Brayden Ray MD;  Location: [facility];  Service: Vascular   • THORACOTOMY  07/13/2016    Right thoracotomy with carinal pneumonectomy.Thoracic and mediastinal lymphadenectomy.Fiberoptic bronchoscopy   • TRANSESOPHAGEAL ECHOCARDIOGRAM (LEIDA)  07/12/2011    Without color flow-LV dysfunction with an EF of 40-45% with hypokinesis/akinesis of the distal septum and apex. Mild left atrial enlargement. No significant valvular abnormalities noted. No pericardial effusion noted         Family History   Problem Relation Age of Onset   • Heart disease Mother    • Other Mother         ischemic heart disease   • Hypertension Mother    • Heart disease Father    • Other Father         ischemic heart disease   • Hypertension Father    • Coronary artery disease Other    • Cancer Sister          Social History     Socioeconomic History   •  "Marital status:      Spouse name: Not on file   • Number of children: Not on file   • Years of education: Not on file   • Highest education level: Not on file   Social Needs   • Financial resource strain: Not on file   • Food insecurity - worry: Not on file   • Food insecurity - inability: Not on file   • Transportation needs - medical: Not on file   • Transportation needs - non-medical: Not on file   Occupational History   • Not on file   Tobacco Use   • Smoking status: Former Smoker     Years: 35.00   • Smokeless tobacco: Never Used   Substance and Sexual Activity   • Alcohol use: No   • Drug use: No   • Sexual activity: Defer   Other Topics Concern   • Not on file   Social History Narrative   • Not on file         Current Outpatient Medications   Medication Sig Dispense Refill   • albuterol (PROVENTIL HFA;VENTOLIN HFA) 108 (90 BASE) MCG/ACT inhaler Inhale 2 puffs every 4 (four) hours as needed for wheezing.     • amLODIPine (NORVASC) 10 MG tablet TAKE ONE TABLET BY MOUTH DAILY 30 tablet 4   • aspirin 81 MG EC tablet Take 81 mg by mouth Daily. Instructed to cont taking     • budesonide-formoterol (SYMBICORT) 80-4.5 MCG/ACT inhaler Inhale 2 puffs.     • Cholecalciferol (VITAMIN D3) 5000 units capsule capsule Take 5,000 Units by mouth Every Other Day. Takes Monday Wednesday Friday     • clopidogrel (PLAVIX) 75 MG tablet TAKE ONE TABLET BY MOUTH DAILY 30 tablet 10   • ferrous sulfate 325 (65 FE) MG tablet One tablet BID 60 tablet 3   • furosemide (LASIX) 40 MG tablet TAKE ONE-HALF TABLET BY MOUTH DAILY 30 tablet 1   • labetalol (NORMODYNE) 200 MG tablet TAKE ONE TABLET BY MOUTH TWICE A DAY 60 tablet 10   • lidocaine (LIDODERM) 5 % Place 1 patch on the skin Daily. Remove & Discard patch within 12 hours or as directed by MD 30 patch 0   • pantoprazole (PROTONIX) 40 MG EC tablet        No current facility-administered medications for this visit.          OBJECTIVE    /62   Pulse 55   Ht 177.8 cm (70\")   " Wt 79.8 kg (176 lb)   SpO2 98%   BMI 25.25 kg/m²         Review of Systems     Constitutional:  Denies recent weight loss, weight gain, fever or chills, no change in exercise tolerance     HENT:  Denies any hearing loss, epistaxis, hoarseness, or difficulty speaking.     Eyes: Wears eyeglasses or contact lenses     Respiratory:  Denies dyspnea with exertion,no cough, wheezing, or hemoptysis.     Cardiovascular: Negative for palpations, chest pain, orthopnea, PND    Gastrointestinal:  Denies change in bowel habits, dyspepsia, ulcer disease, hematochezia, or melena.     Endocrine: Negative for cold intolerance, heat intolerance, polydipsia, polyphagia and polyuria.     Genitourinary: History of renal artery stenosis      Musculoskeletal: DJD    Skin:  Denies any change in hair or nails, rashes, or skin lesions.     Allergic/Immunologic: Negative.  Negative for environmental allergies, food allergies and immunocompromised state.     Neurological:  Denies any history of recurrent headaches, strokes, TIA, or seizure disorder.     Hematological: Denies any food allergies, seasonal allergies, bleeding disorders, or lymphadenopathy.     Psychiatric/Behavioral: Denies any history of depression, substance abuse, or change in cognitive function.         Physical Exam     Constitutional: Cooperative, alert and oriented,  in no acute distress.     HENT:   Head: Normocephalic, normal hair patterns, no masses or tenderness.  Ears, Nose, and Throat: No gross abnormalities. No pallor or cyanosis.  Eyes: EOMS intact, PERRL, conjunctivae and lids unremarkable. Fundoscopic exam and visual fields not performed.   Neck: No palpable masses or adenopathy, no thyromegaly, no JVD, carotid pulses are full and equal bilaterally and without  Bruits.     Cardiovascular: Regular rhythm, S1 and S2 normal, no S3 or S4. No murmurs, gallops, or rubs detected.     Pulmonary/Chest: Chest: normal symmetry,  normal respiratory excursion, no intercostal  retraction, no use of accessory muscles.            Pulmonary: Normal breath sounds. No rales or ronchi.    Abdominal: Abdomen soft, bowel sounds normoactive, no masses, no hepatosplenomegaly, non-tender, no bruits.     Musculoskeletal: No deformities, clubbing, cyanosis, erythema, or edema observed.     Neurological: No gross motor or sensory deficits noted, affect appropriate, oriented to time, person, place.     Skin: Warm and dry to the touch, no apparent skin lesions or masses noted.     Psychiatric: He has a normal mood and affect. His behavior is normal. Judgment and thought content normal.         Procedures      Lab Results   Component Value Date    WBC 11.07 (H) 01/09/2018    HGB 12.6 (L) 01/09/2018    HCT 38.3 (L) 01/09/2018    MCV 86.7 01/09/2018     01/09/2018     Lab Results   Component Value Date    GLUCOSE 96 05/16/2018    BUN 12 05/16/2018    CREATININE 0.97 05/16/2018    EGFRIFNONA 78 05/16/2018    BCR 12.4 05/16/2018    CO2 30.0 05/16/2018    CALCIUM 9.4 05/16/2018    ALBUMIN 4.20 05/16/2018    AST 13 (L) 05/16/2018    ALT 24 05/16/2018     No results found for: CHOL  No results found for: TRIG  No results found for: HDL  No components found for: LDLCALC  No results found for: LDL  No results found for: HDLLDLRATIO  No components found for: CHOLHDL  No results found for: HGBA1C  No results found for: TSH, P1MYJAC, A2PSARM, THYROIDAB        ASSESSMENT AND PLAN  Mr. Pruitt is stable with no clinical evidence of angina, arrhythmia or congestive heart failure.  Antihypertensive therapy with amlodipine, labetalol, antiplatelet therapy with aspirin and Plavix, and Lasix has been continued.  Lipid profile is being checked.    Juanito was seen today for follow-up.    Diagnoses and all orders for this visit:    Secondary hypertension    Mixed hyperlipidemia  -     Lipid Panel; Future    Essential hypertension    Coronary artery disease involving native coronary artery of native heart without angina  pectoris        Patient's Body mass index is 25.25 kg/m². BMI is within normal parameters. No follow-up required..    Eber Thompson MD  1/31/2019  2:43 PM

## 2019-02-26 RX ORDER — AMLODIPINE BESYLATE 10 MG/1
TABLET ORAL
Qty: 30 TABLET | Refills: 3 | Status: SHIPPED | OUTPATIENT
Start: 2019-02-26 | End: 2019-05-30 | Stop reason: SDUPTHER

## 2019-04-05 RX ORDER — FUROSEMIDE 40 MG/1
TABLET ORAL
Qty: 30 TABLET | Refills: 0 | Status: SHIPPED | OUTPATIENT
Start: 2019-04-05 | End: 2019-05-30 | Stop reason: SDUPTHER

## 2019-05-30 RX ORDER — FUROSEMIDE 40 MG/1
TABLET ORAL
Qty: 30 TABLET | Refills: 0 | Status: SHIPPED | OUTPATIENT
Start: 2019-05-30 | End: 2019-07-29 | Stop reason: SDUPTHER

## 2019-05-30 RX ORDER — AMLODIPINE BESYLATE 10 MG/1
TABLET ORAL
Qty: 90 TABLET | Refills: 2 | Status: SHIPPED | OUTPATIENT
Start: 2019-05-30 | End: 2020-03-02 | Stop reason: SDUPTHER

## 2019-06-04 ENCOUNTER — HOSPITAL ENCOUNTER (OUTPATIENT)
Dept: CT IMAGING | Facility: HOSPITAL | Age: 64
Discharge: HOME | End: 2019-06-04
Admitting: THORACIC SURGERY (CARDIOTHORACIC VASCULAR SURGERY)

## 2019-06-04 DIAGNOSIS — C34.91 SQUAMOUS CELL CARCINOMA OF RIGHT LUNG (HCC): ICD-10-CM

## 2019-06-04 PROCEDURE — 71250 CT THORAX DX C-: CPT

## 2019-06-16 DIAGNOSIS — I73.9 PVD (PERIPHERAL VASCULAR DISEASE) (HCC): ICD-10-CM

## 2019-06-17 RX ORDER — CLOPIDOGREL BISULFATE 75 MG/1
TABLET ORAL
Qty: 90 TABLET | Refills: 9 | Status: SHIPPED | OUTPATIENT
Start: 2019-06-17 | End: 2019-06-20 | Stop reason: SDUPTHER

## 2019-06-20 ENCOUNTER — OFFICE VISIT (OUTPATIENT)
Dept: CARDIAC SURGERY | Facility: CLINIC | Age: 64
End: 2019-06-20

## 2019-06-20 VITALS
HEART RATE: 76 BPM | WEIGHT: 165 LBS | TEMPERATURE: 97.2 F | DIASTOLIC BLOOD PRESSURE: 65 MMHG | SYSTOLIC BLOOD PRESSURE: 125 MMHG | HEIGHT: 70 IN | BODY MASS INDEX: 23.62 KG/M2 | OXYGEN SATURATION: 95 %

## 2019-06-20 DIAGNOSIS — I10 ESSENTIAL HYPERTENSION: ICD-10-CM

## 2019-06-20 DIAGNOSIS — E78.2 MIXED HYPERLIPIDEMIA: ICD-10-CM

## 2019-06-20 DIAGNOSIS — I70.1 RENAL ARTERY STENOSIS (HCC): ICD-10-CM

## 2019-06-20 DIAGNOSIS — C34.81 MALIGNANT NEOPLASM OF OVERLAPPING SITES OF RIGHT LUNG (HCC): Primary | ICD-10-CM

## 2019-06-20 DIAGNOSIS — I73.9 PVD (PERIPHERAL VASCULAR DISEASE) (HCC): ICD-10-CM

## 2019-06-20 DIAGNOSIS — I65.23 BILATERAL CAROTID ARTERY STENOSIS: ICD-10-CM

## 2019-06-20 DIAGNOSIS — I25.10 CORONARY ARTERY DISEASE INVOLVING NATIVE CORONARY ARTERY OF NATIVE HEART WITHOUT ANGINA PECTORIS: ICD-10-CM

## 2019-06-20 DIAGNOSIS — J43.1 PANLOBULAR EMPHYSEMA (HCC): ICD-10-CM

## 2019-06-20 PROCEDURE — 99214 OFFICE O/P EST MOD 30 MIN: CPT | Performed by: THORACIC SURGERY (CARDIOTHORACIC VASCULAR SURGERY)

## 2019-06-20 RX ORDER — CLOPIDOGREL BISULFATE 75 MG/1
75 TABLET ORAL DAILY
Qty: 90 TABLET | Refills: 9 | Status: SHIPPED | OUTPATIENT
Start: 2019-06-20 | End: 2020-07-21

## 2019-06-30 NOTE — PROGRESS NOTES
6/20/2019    Juanito Pruitt  1955    Chief Complaint:  Lung cancer    HPI:      PCP:  Preston Dalton MD  Cardiology:  Dr Thompson  GI:  Dr Moya  Hematology:  Dr Bustamante     63yr man with PVD, Carotid Stenosis, CAD, HTN, dyslipidemia, GERD.   RIGHT groin pain resolved.  progressive anemia, workup in progress.  Carotid stenosis requires treatment.  No hemoptysis, cough, wt loss.  Claudication None, No rest pain, No ischemic tissue loss  No amaurosis fugax, No TIA, No stroke  No other associated signs, symptoms or modifying factors.     12/2011:  bilateral renal artery stents  4/2012 JUAN:  RIGHT 1.1 triphasic, LEFT 1.1 triphasic  6/2012 Exercise JUAN:  RIGHT .89 to .69, LEFT 1.0 to .87  4/2014 US RIGHT groin:  [id number] fluid collection deep to femoral vessels.  5/2014 US drainage seroma:  10ml serous fluid, no residual seroma.  5/2014 US RIGHT groin:  5x2x0.5cm fluid collection deep to femoral vessels.  7/2014 Renal Artery Duplex:  RIGHT maxPSV 222cm/s,RI .87, RAR 2.8.  LEFT maxPSV 271cm/s, RI .88, RAR 3.4.  patent bilateral renal artery stents.     9/2012 Carotid Duplex:  ARIC 70-75% antegrade.  LICA <50% antegrade (maxPSV 96cm/s).  9/2012 CTA Carotids:  ARIC 60% small ulcerative plaque.  LICA mild irregularity.  10/2013 CTA Carotids:  ARIC 85%, LICA mild irregularity  12/2013 RIGHT CEA   1/2014 Carotid Duplex:  ARIC 0-15%  7/2014 Carotid Duplex:  ARIC 0-15% antegrade.  LICA 50-79% antegrade (maxPSV 145cm/s).  1/2015 Carotid Duplex:  ARIC 0-15% antegrade.  LICA 50-79% antegrade (maxPSV 147cm/s).  2/2016  Carotid Duplex:  ARIC 0-15% antegrade.  LICA 50-79% antegrade ([id number] cm/s, ratio 2.5) .   2/2016 CTA Carotids:  ARIC no significant disease, LICA 75-80%.  3/2017  LEFT CEA          5/2017  Carotid Duplex:  LICA 0-45% antegrade        11/2017  Carotid Duplex:  ARIC 0-49% maxPSV 91 ratio 1.3 antegrade.  LICA 0-49% % antegrade [id number] cm/s, ratio 1.4         5/2018  Carotid Duplex:  ARIC 0-49%  maxPSV 81 ratio 1.0  LICA 0-49% % antegrade [id number] cm/s, ratio 1.6  Vertebrals antegrade        2018 Carotid Duplex:  ARIC 0-49% (74/26cm/s, ratio 1.4)  LICA 0-49%  antegrade 104/34cm/s, ratio 0.9)  Vertebrals antegrade     2011 CXR:  LEFT lower lobe atelectasis.  2016 CXR:  RIGHT paratracheal mass, medial segment atelectasis.  2016 PET CT:  RIGHT upper lobe post obstructive atelectasis.  mass obstructing upper lobe bronchus SUV 10.  no significant adenopathy.  liver, adrenals ok.  11157 PFT:  FVC 4.2 (95%), FEV1 1.7 (47%), DLCO 48%  2016 AB.42/41/57/26/90%  2016 Quantitative lung perfusion scan:  RIGHT 31%, LEFT 69% (predicted post pneumonectomy FEV1 1100ml, adequate for resection)  2016  Right thoracotomy Pneumonectomy, PATH:  FINAL  RIGHT LUNG: SQUAMOUS CARCINOMA, MODERATELY DIFFERENTIATED OF UPPER LOBE BRONCHUS (4.5 CM).THE NEOPLASM IS 0.5 CM AWAY FROM THE BRONCHIAL MARGIN SUBPLEURAL ABSCESS DISTAL TO THE TUMOR. NO EVIDENCE OF ANGIOLYMPHATIC INVOLVEMENT.  All lymph nodes negative.  2016  Chest xray:  expected postoperative changes following right pneumonectomy. Left lung appears clear. There is a displaced right lateral sixth rib fracture it appears to be acute.   2016  Chest xray:  Old healed left rib fractures noted. Displaced right sixth rib fracture unchanged in appearance. CONCLUSION- No significant change in near complete opacification of the right hemithorax. Left lung remains clear.   2016  Chest xray:  consistent with postpneumonectomy changes. There is again noted a displaced right lateral sixth rib fracture. The patient is status post median sternotomy. Emphysema is noted in the left lung. The left lung is otherwise clear. CONCLUSION- No acute disease.   2017 CT Chest:  No new nodules.  Possible RIGHT paratracheal lymph node vs postop change.  Liver adrenal ok.  2018 CT Chest:  No new nodules,5mm GGO LEFT lower lobe.  .  no change RIGHT paratracheal lymph node.  Liver  adrenals ok.  12/2018 CT Chest:  No new nodules, 5mm GGO LEFT lower lobe.  no change RIGHT paratracheal lymph node.  Liver adrenals ok.  6/2019 CT Chest:  No new nodules, 5mm GGO LEFT lower lobe.  no change RIGHT paratracheal lymph node.  Liver adrenals ok.    The following portions of the patient's history were reviewed and updated as appropriate: allergies, current medications, past family history, past medical history, past social history, past surgical history and problem list.  Recent images independently reviewed.  Available laboratory values reviewed.    PMH:  Past Medical History:   Diagnosis Date   • Allergic rhinitis    • Atherosclerosis of native arteries of the extremities with ulceration (CMS/HCC)     Bilateral legs   • Benign hypertension    • Carotid artery stenosis    • Coagulopathy (CMS/HCC)     on plavix and asa (instructed to continue per md)   • Coronary atherosclerosis    • Essential hypertension    • Hypercholesterolemia    • Inguinal pain     small right groin seroma   • Malignant neoplasm of lung (CMS/HCC)     Right upper lobe mass suspicious for lung cancer   • JUAN on CPAP    • PVD (peripheral vascular disease) (CMS/HCC)    • Renal artery stenosis (CMS/HCC)    • Simple chronic bronchitis (CMS/HCC)    • Sleep apnea    • SOB (shortness of breath)    • Squamous cell carcinoma of lung (CMS/HCC)      Past Surgical History:   Procedure Laterality Date   • CARDIAC CATHETERIZATION  07/12/2011    Multi-vessel coronary artery disease with critical lesion noted in the LAD coronary artery, left circumflex coronary artery and right coronary artery as described above. The LAD appeared to be chronically occluded    • CAROTID ENDARTERECTOMY  12/03/2013    Right carotid endarterectomy. Carotid cerebral arteriogram   • CORONARY ARTERY BYPASS GRAFT  07/14/2011    LIMA->LAD SVG->OM2 SVG->OM3 SVG->steve branch   • ESOPHAGOSCOPY / EGD  12/06/2011    With tube-Normal hypopharynx, GE junction, Duodenum, symmetrical  & patent pylorus. Tongue of columnar epithelium that could be a Ivy's esophagus present. Multiple biopsies performed. Chronic gastritis in antrum. Biopsy taken   • HIP SURGERY  01/18/2016    Right total hip arthroplasty anterior approach.   • LUNG REMOVAL, TOTAL  07/13/2016    Right thoracotomy with Carinal Pneumonectomy, Thoracic & Mediastinal Lymphadenetomy, Fiberoptic Bronchoscopy   • AR THROMBOENDARTECTMY NECK,NECK INCIS Left 3/14/2017    Procedure: CAROTID ENDARTERECTOMY, ARTERIOGRAM ;  Surgeon: Brayden Ray MD;  Location: [facility];  Service: Vascular   • THORACOTOMY  07/13/2016    Right thoracotomy with carinal pneumonectomy.Thoracic and mediastinal lymphadenectomy.Fiberoptic bronchoscopy   • TRANSESOPHAGEAL ECHOCARDIOGRAM (LEIDA)  07/12/2011    Without color flow-LV dysfunction with an EF of 40-45% with hypokinesis/akinesis of the distal septum and apex. Mild left atrial enlargement. No significant valvular abnormalities noted. No pericardial effusion noted     Family History   Problem Relation Age of Onset   • Heart disease Mother    • Other Mother         ischemic heart disease   • Hypertension Mother    • Heart disease Father    • Other Father         ischemic heart disease   • Hypertension Father    • Coronary artery disease Other    • Cancer Sister      Social History     Tobacco Use   • Smoking status: Former Smoker     Years: 35.00   • Smokeless tobacco: Never Used   Substance Use Topics   • Alcohol use: No   • Drug use: No       ALLERGIES:  Allergies   Allergen Reactions   • Penicillins Itching     Rash,itching         MEDICATIONS:    Current Outpatient Medications:   •  albuterol (PROVENTIL HFA;VENTOLIN HFA) 108 (90 BASE) MCG/ACT inhaler, Inhale 2 puffs every 4 (four) hours as needed for wheezing., Disp: , Rfl:   •  amLODIPine (NORVASC) 10 MG tablet, TAKE ONE TABLET BY MOUTH DAILY, Disp: 90 tablet, Rfl: 2  •  aspirin 81 MG EC tablet, Take 81 mg by mouth Daily. Instructed to cont taking,  "Disp: , Rfl:   •  budesonide-formoterol (SYMBICORT) 80-4.5 MCG/ACT inhaler, Inhale 2 puffs., Disp: , Rfl:   •  Cholecalciferol (VITAMIN D3) 5000 units capsule capsule, Take 5,000 Units by mouth Every Other Day. Takes Monday Wednesday Friday, Disp: , Rfl:   •  clopidogrel (PLAVIX) 75 MG tablet, Take 1 tablet by mouth Daily., Disp: 90 tablet, Rfl: 9  •  ferrous sulfate 325 (65 FE) MG tablet, One tablet BID, Disp: 60 tablet, Rfl: 3  •  furosemide (LASIX) 40 MG tablet, TAKE ONE-HALF TABLET BY MOUTH DAILY, Disp: 30 tablet, Rfl: 0  •  labetalol (NORMODYNE) 200 MG tablet, TAKE ONE TABLET BY MOUTH TWICE A DAY, Disp: 60 tablet, Rfl: 10  •  lidocaine (LIDODERM) 5 %, Place 1 patch on the skin Daily. Remove & Discard patch within 12 hours or as directed by MD, Disp: 30 patch, Rfl: 0  •  pantoprazole (PROTONIX) 40 MG EC tablet, , Disp: , Rfl:     Review of Systems   Review of Systems   Constitution: Positive for malaise/fatigue. Negative for weakness and weight loss.   Cardiovascular: Positive for dyspnea on exertion. Negative for chest pain and claudication.   Respiratory: Negative for cough and shortness of breath.    Skin: Negative for color change and poor wound healing.   Musculoskeletal: Positive for back pain.   Neurological: Negative for dizziness and numbness.       Physical Exam   Vitals:    06/20/19 1304   BP: 125/65   BP Location: Left arm   Pulse: 76   Temp: 97.2 °F (36.2 °C)   TempSrc: Temporal   SpO2: 95%   Weight: 74.8 kg (165 lb)   Height: 177.8 cm (70\")     Physical Exam   Constitutional: He is oriented to person, place, and time. He is active and cooperative. He does not appear ill. No distress.   HENT:   Right Ear: Hearing normal.   Left Ear: Hearing normal.   Nose: No nasal deformity. No epistaxis.   Mouth/Throat: He does not have dentures. Normal dentition.   Cardiovascular: Normal rate and regular rhythm.   No murmur heard.  Pulses:       Carotid pulses are 2+ on the right side with bruit, and 2+ on the " left side.       Radial pulses are 2+ on the right side, and 2+ on the left side.        Posterior tibial pulses are 2+ on the right side, and 2+ on the left side.   Pulmonary/Chest: Effort normal and breath sounds normal.   Abdominal: Soft. He exhibits no distension and no mass. There is no tenderness.   Musculoskeletal: He exhibits no deformity.   Gait normal.    Neurological: He is alert and oriented to person, place, and time. He has normal strength.   Skin: Skin is warm and dry. No cyanosis or erythema. No pallor.   No venous staining   Psychiatric: He has a normal mood and affect. His speech is normal. Judgment and thought content normal.     Results for JORGE LORA (MRN [id number]) as of 6/30/2019 16:58  GFR 74 Ref. Range 11/9/2018 09:07   Creatinine Latest Ref Range: 0.6 - 1.3 mg/dL 1.2 ((NONE))   BUN Latest Ref Range: 7.0 - 18.0 mg/dL 11 ((NONE))     ASSESSMENT:  Jorge was seen today for lung cancer.    Diagnoses and all orders for this visit:    Malignant neoplasm of overlapping sites of right lung (CMS/HCC)  -     CT Chest Without Contrast; Future    PVD (peripheral vascular disease) (CMS/HCC)  -     clopidogrel (PLAVIX) 75 MG tablet; Take 1 tablet by mouth Daily.    Coronary artery disease involving native coronary artery of native heart without angina pectoris    Essential hypertension    Mixed hyperlipidemia    Renal artery stenosis (CMS/HCC)    Bilateral carotid artery stenosis  -     Duplex Carotid Ultrasound CAR; Future    Panlobular emphysema (CMS/HCC)    PLAN:    Return after above studies complete  Recommended regular physical activity, progressive walking program.  Continue current medications as directed.    Thank you for the opportunity to participate in this patient's care.    Copy to primary care provider.    EMR Dragon/Transcription disclaimer:   Much of this encounter note is an electronic transcription/translation of spoken language to printed text. The electronic translation of  spoken language may permit erroneous, or at times, nonsensical words or phrases to be inadvertently transcribed; Although I have reviewed the note for such errors, some may still exist.

## 2019-07-29 RX ORDER — FUROSEMIDE 40 MG/1
TABLET ORAL
Qty: 30 TABLET | Refills: 0 | Status: SHIPPED | OUTPATIENT
Start: 2019-07-29 | End: 2019-09-26 | Stop reason: SDUPTHER

## 2019-08-22 ENCOUNTER — OFFICE VISIT (OUTPATIENT)
Dept: CARDIOLOGY | Facility: CLINIC | Age: 64
End: 2019-08-22

## 2019-08-22 VITALS
BODY MASS INDEX: 23.62 KG/M2 | SYSTOLIC BLOOD PRESSURE: 138 MMHG | OXYGEN SATURATION: 98 % | DIASTOLIC BLOOD PRESSURE: 80 MMHG | WEIGHT: 165 LBS | HEART RATE: 74 BPM | HEIGHT: 70 IN

## 2019-08-22 DIAGNOSIS — I25.10 CORONARY ARTERY DISEASE INVOLVING NATIVE CORONARY ARTERY OF NATIVE HEART WITHOUT ANGINA PECTORIS: Primary | ICD-10-CM

## 2019-08-22 DIAGNOSIS — I10 ESSENTIAL HYPERTENSION: ICD-10-CM

## 2019-08-22 DIAGNOSIS — E78.2 MIXED HYPERLIPIDEMIA: ICD-10-CM

## 2019-08-22 DIAGNOSIS — Z95.1 HX OF CABG: ICD-10-CM

## 2019-08-22 PROCEDURE — 99214 OFFICE O/P EST MOD 30 MIN: CPT | Performed by: INTERNAL MEDICINE

## 2019-08-23 NOTE — PROGRESS NOTES
Juanito Pruitt  63 y.o. male    08/22/2019  1. Coronary artery disease involving native coronary artery of native heart without angina pectoris    2. Essential hypertension    3. Mixed hyperlipidemia    4. Hx of CABG        History of Present Illness  Mr. Pruitt is here for follow-up of his above-stated problems.  He has done very well from a cardiac standpoint and denied any chest pain, shortness of breath, palpitation.  He has been compliant with his medications.  He has documented extensive peripheral vascular disease and has been followed by Dr. Ray on a regular basis.  He said history of bilateral renal artery stents in December 2011, right carotid endarterectomy in 2013, Left carotid endarterectomy in 2017.  His bypass surgery was in 2011.  In 2016 he underwent right thoracotomy and pneumonectomy for squamous cell carcinoma right lung.  Clinical exam today was unremarkable with no signs of congestive heart failure.  His blood pressure was in the normal range.  His lipid profiles are in the normal range.  He is able to perform his activities of daily living.  .         PMH:  Medical History        Past Medical History:   Diagnosis Date   • Allergic rhinitis     • Atherosclerosis of native arteries of the extremities with ulceration (CMS/HCC)       Bilateral legs   • Benign hypertension     • Carotid artery stenosis     • Coagulopathy (CMS/HCC)       on plavix and asa (instructed to continue per md)   • Coronary atherosclerosis     • Essential hypertension     • Hypercholesterolemia     • Inguinal pain       small right groin seroma   • Malignant neoplasm of lung (CMS/HCC)       Right upper lobe mass suspicious for lung cancer   • JUAN on CPAP     • PVD (peripheral vascular disease) (CMS/HCC)     • Renal artery stenosis (CMS/HCC)     • Simple chronic bronchitis (CMS/HCC)     • Sleep apnea     • SOB (shortness of breath)     • Squamous cell carcinoma of lung (CMS/HCC)           Surgical History            SUBJECTIVE    Allergies   Allergen Reactions   • Penicillins Itching     Rash,itching         Past Medical History:   Diagnosis Date   • Allergic rhinitis    • Atherosclerosis of native arteries of the extremities with ulceration (CMS/HCC)     Bilateral legs   • Benign hypertension    • Carotid artery stenosis    • Coagulopathy (CMS/HCC)     on plavix and asa (instructed to continue per md)   • Coronary atherosclerosis    • Essential hypertension    • Hypercholesterolemia    • Inguinal pain     small right groin seroma   • Malignant neoplasm of lung (CMS/HCC)     Right upper lobe mass suspicious for lung cancer   • JUAN on CPAP    • PVD (peripheral vascular disease) (CMS/HCC)    • Renal artery stenosis (CMS/HCC)    • Simple chronic bronchitis (CMS/HCC)    • Sleep apnea    • SOB (shortness of breath)    • Squamous cell carcinoma of lung (CMS/HCC)          Past Surgical History:   Procedure Laterality Date   • CARDIAC CATHETERIZATION  07/12/2011    Multi-vessel coronary artery disease with critical lesion noted in the LAD coronary artery, left circumflex coronary artery and right coronary artery as described above. The LAD appeared to be chronically occluded    • CAROTID ENDARTERECTOMY  12/03/2013    Right carotid endarterectomy. Carotid cerebral arteriogram   • CORONARY ARTERY BYPASS GRAFT  07/14/2011    LIMA->LAD SVG->OM2 SVG->OM3 SVG->steve branch   • ESOPHAGOSCOPY / EGD  12/06/2011    With tube-Normal hypopharynx, GE junction, Duodenum, symmetrical & patent pylorus. Tongue of columnar epithelium that could be a Ivy's esophagus present. Multiple biopsies performed. Chronic gastritis in antrum. Biopsy taken   • HIP SURGERY  01/18/2016    Right total hip arthroplasty anterior approach.   • LUNG REMOVAL, TOTAL  07/13/2016    Right thoracotomy with Carinal Pneumonectomy, Thoracic & Mediastinal Lymphadenetomy, Fiberoptic Bronchoscopy   • NV THROMBOENDARTECTMY NECK,NECK INCIS Left 3/14/2017    Procedure:  CAROTID ENDARTERECTOMY, ARTERIOGRAM ;  Surgeon: Brayden Ray MD;  Location: [facility];  Service: Vascular   • THORACOTOMY  07/13/2016    Right thoracotomy with carinal pneumonectomy.Thoracic and mediastinal lymphadenectomy.Fiberoptic bronchoscopy   • TRANSESOPHAGEAL ECHOCARDIOGRAM (LEIDA)  07/12/2011    Without color flow-LV dysfunction with an EF of 40-45% with hypokinesis/akinesis of the distal septum and apex. Mild left atrial enlargement. No significant valvular abnormalities noted. No pericardial effusion noted         Family History   Problem Relation Age of Onset   • Heart disease Mother    • Other Mother         ischemic heart disease   • Hypertension Mother    • Heart disease Father    • Other Father         ischemic heart disease   • Hypertension Father    • Coronary artery disease Other    • Cancer Sister          Social History     Socioeconomic History   • Marital status:      Spouse name: Not on file   • Number of children: Not on file   • Years of education: Not on file   • Highest education level: Not on file   Tobacco Use   • Smoking status: Former Smoker     Years: 35.00   • Smokeless tobacco: Never Used   Substance and Sexual Activity   • Alcohol use: No   • Drug use: No   • Sexual activity: Defer         Current Outpatient Medications   Medication Sig Dispense Refill   • albuterol (PROVENTIL HFA;VENTOLIN HFA) 108 (90 BASE) MCG/ACT inhaler Inhale 2 puffs every 4 (four) hours as needed for wheezing.     • amLODIPine (NORVASC) 10 MG tablet TAKE ONE TABLET BY MOUTH DAILY 90 tablet 2   • aspirin 81 MG EC tablet Take 81 mg by mouth Daily. Instructed to cont taking     • budesonide-formoterol (SYMBICORT) 80-4.5 MCG/ACT inhaler Inhale 2 puffs.     • Cholecalciferol (VITAMIN D3) 5000 units capsule capsule Take 5,000 Units by mouth Every Other Day. Takes Monday Wednesday Friday     • clopidogrel (PLAVIX) 75 MG tablet Take 1 tablet by mouth Daily. 90 tablet 9   • ferrous sulfate 325 (65 FE)  "MG tablet One tablet BID 60 tablet 3   • furosemide (LASIX) 40 MG tablet TAKE 1/2 TABLET BY MOUTH DAILY 30 tablet 0   • labetalol (NORMODYNE) 200 MG tablet TAKE ONE TABLET BY MOUTH TWICE A DAY 60 tablet 10   • lidocaine (LIDODERM) 5 % Place 1 patch on the skin Daily. Remove & Discard patch within 12 hours or as directed by MD 30 patch 0   • pantoprazole (PROTONIX) 40 MG EC tablet        No current facility-administered medications for this visit.          OBJECTIVE    /80   Pulse 74   Ht 177.8 cm (70\")   Wt 74.8 kg (165 lb)   SpO2 98%   BMI 23.68 kg/m²         Review of Systems     Constitutional:  Denies recent weight loss, weight gain, fever or chills     HENT:  Denies any hearing loss, epistaxis, hoarseness, or difficulty speaking.     Eyes: Wears eyeglasses or contact lenses     Respiratory:  Denies dyspnea with exertion,no cough, wheezing, or hemoptysis.     Cardiovascular: Negative for palpations, chest pain, orthopnea, PND    Gastrointestinal:  Denies change in bowel habits, dyspepsia, ulcer disease, hematochezia, or melena.     Endocrine: Negative for cold intolerance, heat intolerance, polydipsia, polyphagia and polyuria.    Genitourinary: Negative.      Musculoskeletal: DJD    Skin:  Denies any change in hair or nails, rashes, or skin lesions.      Neurological:  Denies any history of recurrent headaches, strokes, TIA, or seizure disorder.     Hematological: Denies any food allergies, seasonal allergies, bleeding disorders, or lymphadenopathy.     Psychiatric/Behavioral: Denies any history of depression, substance abuse, or change in cognitive function.         Physical Exam     Constitutional: Cooperative, alert and oriented,  in no acute distress.     HENT:   Head: Normocephalic, normal hair patterns, no masses or tenderness.  Ears, Nose, and Throat: No gross abnormalities. No pallor or cyanosis.   Eyes: EOMS intact, PERRL, conjunctivae and lids unremarkable. Fundoscopic exam and visual fields " not performed.   Neck: No palpable masses or adenopathy, no thyromegaly, no JVD, carotid pulses are full.  History of bilateral carotid endarterectomy    Cardiovascular: Regular rhythm, S1 and S2 normal, no S3 or S4.  No murmurs, gallops, or rubs detected.     Pulmonary/Chest: Chest: normal symmetry, no tenderness to palpation, normal respiratory excursion, no intercostal retraction, no use of accessory muscles.            Pulmonary: Normal breath sounds. No rales or ronchi.    Abdominal: Abdomen soft, bowel sounds normoactive, no masses, no hepatosplenomegaly, non-tender, no bruits.     Musculoskeletal: No deformities, clubbing, cyanosis, erythema, or edema observed.     Neurological: No gross motor or sensory deficits noted, affect appropriate, oriented to time, person, place.     Skin: Warm and dry to the touch, no apparent skin lesions or masses noted.     Psychiatric: He has a normal mood and affect. His behavior is normal. Judgment and thought content normal.         Procedures      Lab Results   Component Value Date    WBC 14.0 (H) 08/12/2019    HGB 12.8 (L) 08/12/2019    HCT 38.3 (L) 08/12/2019    MCV 89.7 08/12/2019     08/12/2019     Lab Results   Component Value Date    GLUCOSE 96 05/16/2018    BUN 11 11/09/2018    CREATININE 1.2 11/09/2018    EGFRIFNONA 78 05/16/2018    EGFRIFAFRI 74 11/09/2018    BCR 12.4 05/16/2018    CO2 28 11/09/2018    CALCIUM 9.2 11/09/2018    ALBUMIN 3.8 11/09/2018    AST 15 11/09/2018    ALT 7 (L) 11/09/2018     Lab Results   Component Value Date    CHOL 129 01/31/2019     Lab Results   Component Value Date    TRIG 136 01/31/2019     Lab Results   Component Value Date    HDL 40 (L) 01/31/2019     No components found for: LDLCALC  Lab Results   Component Value Date    LDL 70 01/31/2019     No results found for: HDLLDLRATIO  No components found for: CHOLHDL  No results found for: HGBA1C  No results found for: TSH, P8EHSLT, N3KWVYK, THYROIDAB        ASSESSMENT AND PLAN    Edmond has multiple medical issues but is clinically stable from a cardiac standpoint with no evidence of angina, arrhythmia or congestive heart failure.  I have continued antiplatelet therapy with aspirin and Plavix, antihypertensive therapy with labetalol, amlodipine and diuretic therapy has been continued.     Juanito was seen today for follow-up.    Diagnoses and all orders for this visit:    Coronary artery disease involving native coronary artery of native heart without angina pectoris    Essential hypertension    Mixed hyperlipidemia    Hx of CABG        Patient's Body mass index is 23.68 kg/m². BMI is within normal parameters. No follow-up required..  He is a non-smoker    Eber Thompson MD  8/22/2019  7:40 PM

## 2019-08-28 RX ORDER — SODIUM CHLORIDE 0.9 % (FLUSH) 0.9 %
10 SYRINGE (ML) INJECTION AS NEEDED
Status: CANCELLED | OUTPATIENT
Start: 2019-08-29

## 2019-08-28 RX ORDER — SODIUM CHLORIDE 0.9 % (FLUSH) 0.9 %
3 SYRINGE (ML) INJECTION EVERY 12 HOURS SCHEDULED
Status: CANCELLED | OUTPATIENT
Start: 2019-08-29

## 2019-08-29 ENCOUNTER — ANESTHESIA (OUTPATIENT)
Dept: GASTROENTEROLOGY | Facility: HOSPITAL | Age: 64
End: 2019-08-29

## 2019-08-29 ENCOUNTER — HOSPITAL ENCOUNTER (OUTPATIENT)
Facility: HOSPITAL | Age: 64
Setting detail: HOSPITAL OUTPATIENT SURGERY
Discharge: HOME | End: 2019-08-29
Attending: INTERNAL MEDICINE | Admitting: INTERNAL MEDICINE

## 2019-08-29 ENCOUNTER — ANESTHESIA EVENT (OUTPATIENT)
Dept: GASTROENTEROLOGY | Facility: HOSPITAL | Age: 64
End: 2019-08-29

## 2019-08-29 VITALS
WEIGHT: 160.05 LBS | RESPIRATION RATE: 18 BRPM | TEMPERATURE: 97.5 F | HEIGHT: 70 IN | OXYGEN SATURATION: 94 % | DIASTOLIC BLOOD PRESSURE: 55 MMHG | HEART RATE: 67 BPM | BODY MASS INDEX: 22.91 KG/M2 | SYSTOLIC BLOOD PRESSURE: 109 MMHG

## 2019-08-29 DIAGNOSIS — K22.70 BARRETT ESOPHAGUS: ICD-10-CM

## 2019-08-29 PROCEDURE — 25010000002 PROPOFOL 10 MG/ML EMULSION: Performed by: NURSE ANESTHETIST, CERTIFIED REGISTERED

## 2019-08-29 PROCEDURE — 88305 TISSUE EXAM BY PATHOLOGIST: CPT | Performed by: PATHOLOGY

## 2019-08-29 PROCEDURE — 88305 TISSUE EXAM BY PATHOLOGIST: CPT | Performed by: INTERNAL MEDICINE

## 2019-08-29 RX ORDER — DEXTROSE AND SODIUM CHLORIDE 5; .45 G/100ML; G/100ML
30 INJECTION, SOLUTION INTRAVENOUS CONTINUOUS PRN
Status: DISCONTINUED | OUTPATIENT
Start: 2019-08-29 | End: 2019-08-29 | Stop reason: HOSPADM

## 2019-08-29 RX ORDER — LIDOCAINE HYDROCHLORIDE 20 MG/ML
INJECTION, SOLUTION INTRAVENOUS AS NEEDED
Status: DISCONTINUED | OUTPATIENT
Start: 2019-08-29 | End: 2019-08-29 | Stop reason: SURG

## 2019-08-29 RX ORDER — ONDANSETRON 2 MG/ML
4 INJECTION INTRAMUSCULAR; INTRAVENOUS ONCE AS NEEDED
Status: DISCONTINUED | OUTPATIENT
Start: 2019-08-29 | End: 2019-08-29 | Stop reason: HOSPADM

## 2019-08-29 RX ORDER — PROPOFOL 10 MG/ML
VIAL (ML) INTRAVENOUS AS NEEDED
Status: DISCONTINUED | OUTPATIENT
Start: 2019-08-29 | End: 2019-08-29 | Stop reason: SURG

## 2019-08-29 RX ADMIN — PROPOFOL 10 MG: 10 INJECTION, EMULSION INTRAVENOUS at 08:45

## 2019-08-29 RX ADMIN — PROPOFOL 80 MG: 10 INJECTION, EMULSION INTRAVENOUS at 08:43

## 2019-08-29 RX ADMIN — LIDOCAINE HYDROCHLORIDE 60 MG: 20 INJECTION, SOLUTION INTRAVENOUS at 08:43

## 2019-08-29 RX ADMIN — PROPOFOL 10 MG: 10 INJECTION, EMULSION INTRAVENOUS at 08:46

## 2019-08-29 RX ADMIN — DEXTROSE AND SODIUM CHLORIDE 30 ML/HR: 5; 450 INJECTION, SOLUTION INTRAVENOUS at 08:05

## 2019-08-29 RX ADMIN — PROPOFOL 10 MG: 10 INJECTION, EMULSION INTRAVENOUS at 08:47

## 2019-08-29 RX ADMIN — PROPOFOL 10 MG: 10 INJECTION, EMULSION INTRAVENOUS at 08:48

## 2019-08-29 NOTE — ANESTHESIA POSTPROCEDURE EVALUATION
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])    Surgeon:  Minesh Moya DO Provider:  Pietro James CRNA    Anesthesia Type:  MAC ASA Status:  4          Anesthesia Type: MAC  Last vitals  BP   122/62 (08/29/19 0755)   Temp       Pulse   75 (08/29/19 0755)   Resp   18 (08/29/19 0755)     SpO2   97 % (08/29/19 0755)     Post Anesthesia Care and Evaluation    Patient location during evaluation: PACU  Level of consciousness: sleepy but conscious  Pain score: 0  Pain management: adequate  Airway patency: patent  Anesthetic complications: No anesthetic complications  PONV Status: none  Cardiovascular status: acceptable and hemodynamically stable  Respiratory status: acceptable and spontaneous ventilation  Hydration status: acceptable

## 2019-08-29 NOTE — ANESTHESIA PREPROCEDURE EVALUATION
Anesthesia Evaluation     Patient summary reviewed and Nursing notes reviewed   NPO Solid Status: > 8 hours  NPO Liquid Status: > 2 hours           Airway   Mallampati: II  TM distance: >3 FB  Dental      Pulmonary - normal exam   (+) a smoker Former, lung cancer, COPD, shortness of breath, sleep apnea on CPAP,   Cardiovascular - normal exam    ECG reviewed  Patient on routine beta blocker and Beta blocker given within 24 hours of surgery    (+) hypertension well controlled 2 medications or greater, valvular problems/murmurs TI and MR, CAD, CABG, PVD, hyperlipidemia,  carotid artery disease      Neuro/Psych  GI/Hepatic/Renal/Endo      Musculoskeletal     Abdominal  - normal exam   Substance History      OB/GYN          Other      history of cancer                  Anesthesia Plan    ASA 4     MAC     intravenous induction   Anesthetic plan, all risks, benefits, and alternatives have been provided, discussed and informed consent has been obtained with: patient.

## 2019-08-30 LAB
LAB AP CASE REPORT: NORMAL
PATH REPORT.FINAL DX SPEC: NORMAL
PATH REPORT.GROSS SPEC: NORMAL

## 2019-09-26 RX ORDER — FUROSEMIDE 40 MG/1
TABLET ORAL
Qty: 30 TABLET | Refills: 0 | Status: SHIPPED | OUTPATIENT
Start: 2019-09-26 | End: 2019-11-25 | Stop reason: SDUPTHER

## 2019-10-28 RX ORDER — LABETALOL 200 MG/1
TABLET, FILM COATED ORAL
Qty: 60 TABLET | Refills: 9 | Status: SHIPPED | OUTPATIENT
Start: 2019-10-28 | End: 2020-08-26

## 2019-11-25 RX ORDER — FUROSEMIDE 40 MG/1
TABLET ORAL
Qty: 30 TABLET | Refills: 0 | Status: SHIPPED | OUTPATIENT
Start: 2019-11-25 | End: 2020-02-03 | Stop reason: SDUPTHER

## 2019-12-02 ENCOUNTER — APPOINTMENT (OUTPATIENT)
Dept: GENERAL RADIOLOGY | Facility: HOSPITAL | Age: 64
End: 2019-12-02

## 2019-12-02 ENCOUNTER — HOSPITAL ENCOUNTER (INPATIENT)
Facility: HOSPITAL | Age: 64
LOS: 6 days | Discharge: HOME | End: 2019-12-08
Attending: FAMILY MEDICINE | Admitting: FAMILY MEDICINE

## 2019-12-02 DIAGNOSIS — I50.9 ACUTE ON CHRONIC CONGESTIVE HEART FAILURE, UNSPECIFIED HEART FAILURE TYPE (HCC): ICD-10-CM

## 2019-12-02 DIAGNOSIS — J44.1 COPD EXACERBATION (HCC): Primary | ICD-10-CM

## 2019-12-02 DIAGNOSIS — I95.9 HYPOTENSION, UNSPECIFIED HYPOTENSION TYPE: ICD-10-CM

## 2019-12-02 PROBLEM — J96.21 ACUTE ON CHRONIC RESPIRATORY FAILURE WITH HYPOXIA (HCC): Chronic | Status: ACTIVE | Noted: 2019-12-02

## 2019-12-02 LAB
ALBUMIN SERPL-MCNC: 4.2 G/DL (ref 3.5–5.2)
ALBUMIN/GLOB SERPL: 1.4 G/DL
ALP SERPL-CCNC: 106 U/L (ref 39–117)
ALT SERPL W P-5'-P-CCNC: 13 U/L (ref 1–41)
ANION GAP SERPL CALCULATED.3IONS-SCNC: 10 MMOL/L (ref 5–15)
AST SERPL-CCNC: 23 U/L (ref 1–40)
BASOPHILS # BLD AUTO: 0.02 10*3/MM3 (ref 0–0.2)
BASOPHILS NFR BLD AUTO: 0.2 % (ref 0–1.5)
BILIRUB SERPL-MCNC: 0.4 MG/DL (ref 0.2–1.2)
BUN BLD-MCNC: 20 MG/DL (ref 8–23)
BUN/CREAT SERPL: 20.2 (ref 7–25)
CALCIUM SPEC-SCNC: 9.3 MG/DL (ref 8.6–10.5)
CHLORIDE SERPL-SCNC: 94 MMOL/L (ref 98–107)
CK SERPL-CCNC: 197 U/L (ref 20–200)
CO2 SERPL-SCNC: 29 MMOL/L (ref 22–29)
CREAT BLD-MCNC: 0.99 MG/DL (ref 0.76–1.27)
DEPRECATED RDW RBC AUTO: 43.8 FL (ref 37–54)
EOSINOPHIL # BLD AUTO: 0.01 10*3/MM3 (ref 0–0.4)
EOSINOPHIL NFR BLD AUTO: 0.1 % (ref 0.3–6.2)
ERYTHROCYTE [DISTWIDTH] IN BLOOD BY AUTOMATED COUNT: 14.1 % (ref 12.3–15.4)
FLUAV AG NPH QL: NEGATIVE
FLUBV AG NPH QL IA: NEGATIVE
GFR SERPL CREATININE-BSD FRML MDRD: 76 ML/MIN/1.73
GLOBULIN UR ELPH-MCNC: 2.9 GM/DL
GLUCOSE BLD-MCNC: 104 MG/DL (ref 65–99)
HCT VFR BLD AUTO: 42.5 % (ref 37.5–51)
HGB BLD-MCNC: 13.9 G/DL (ref 13–17.7)
HOLD SPECIMEN: NORMAL
HOLD SPECIMEN: NORMAL
IMM GRANULOCYTES # BLD AUTO: 0.04 10*3/MM3 (ref 0–0.05)
IMM GRANULOCYTES NFR BLD AUTO: 0.4 % (ref 0–0.5)
LYMPHOCYTES # BLD AUTO: 1.18 10*3/MM3 (ref 0.7–3.1)
LYMPHOCYTES NFR BLD AUTO: 12 % (ref 19.6–45.3)
MAGNESIUM SERPL-MCNC: 1.9 MG/DL (ref 1.6–2.4)
MCH RBC QN AUTO: 28 PG (ref 26.6–33)
MCHC RBC AUTO-ENTMCNC: 32.7 G/DL (ref 31.5–35.7)
MCV RBC AUTO: 85.7 FL (ref 79–97)
MONOCYTES # BLD AUTO: 0.57 10*3/MM3 (ref 0.1–0.9)
MONOCYTES NFR BLD AUTO: 5.8 % (ref 5–12)
NEUTROPHILS # BLD AUTO: 8.04 10*3/MM3 (ref 1.7–7)
NEUTROPHILS NFR BLD AUTO: 81.5 % (ref 42.7–76)
NRBC BLD AUTO-RTO: 0 /100 WBC (ref 0–0.2)
NT-PROBNP SERPL-MCNC: 3321 PG/ML (ref 5–900)
PLATELET # BLD AUTO: 211 10*3/MM3 (ref 140–450)
PMV BLD AUTO: 12.1 FL (ref 6–12)
POTASSIUM BLD-SCNC: 4.7 MMOL/L (ref 3.5–5.2)
PROT SERPL-MCNC: 7.1 G/DL (ref 6–8.5)
RBC # BLD AUTO: 4.96 10*6/MM3 (ref 4.14–5.8)
SODIUM BLD-SCNC: 133 MMOL/L (ref 136–145)
TROPONIN T SERPL-MCNC: <0.01 NG/ML (ref 0–0.03)
WBC NRBC COR # BLD: 9.86 10*3/MM3 (ref 3.4–10.8)
WHOLE BLOOD HOLD SPECIMEN: NORMAL
WHOLE BLOOD HOLD SPECIMEN: NORMAL

## 2019-12-02 PROCEDURE — G0378 HOSPITAL OBSERVATION PER HR: HCPCS

## 2019-12-02 PROCEDURE — 94640 AIRWAY INHALATION TREATMENT: CPT

## 2019-12-02 PROCEDURE — 93005 ELECTROCARDIOGRAM TRACING: CPT | Performed by: FAMILY MEDICINE

## 2019-12-02 PROCEDURE — 85025 COMPLETE CBC W/AUTO DIFF WBC: CPT

## 2019-12-02 PROCEDURE — 71045 X-RAY EXAM CHEST 1 VIEW: CPT

## 2019-12-02 PROCEDURE — 82550 ASSAY OF CK (CPK): CPT | Performed by: FAMILY MEDICINE

## 2019-12-02 PROCEDURE — 99285 EMERGENCY DEPT VISIT HI MDM: CPT

## 2019-12-02 PROCEDURE — 80053 COMPREHEN METABOLIC PANEL: CPT | Performed by: FAMILY MEDICINE

## 2019-12-02 PROCEDURE — 93010 ELECTROCARDIOGRAM REPORT: CPT | Performed by: INTERNAL MEDICINE

## 2019-12-02 PROCEDURE — 94760 N-INVAS EAR/PLS OXIMETRY 1: CPT

## 2019-12-02 PROCEDURE — 87804 INFLUENZA ASSAY W/OPTIC: CPT | Performed by: FAMILY MEDICINE

## 2019-12-02 PROCEDURE — 93005 ELECTROCARDIOGRAM TRACING: CPT

## 2019-12-02 PROCEDURE — 25010000002 METHYLPREDNISOLONE PER 125 MG: Performed by: FAMILY MEDICINE

## 2019-12-02 PROCEDURE — 84484 ASSAY OF TROPONIN QUANT: CPT | Performed by: FAMILY MEDICINE

## 2019-12-02 PROCEDURE — 94660 CPAP INITIATION&MGMT: CPT

## 2019-12-02 PROCEDURE — 83880 ASSAY OF NATRIURETIC PEPTIDE: CPT | Performed by: FAMILY MEDICINE

## 2019-12-02 PROCEDURE — 94799 UNLISTED PULMONARY SVC/PX: CPT

## 2019-12-02 PROCEDURE — 83735 ASSAY OF MAGNESIUM: CPT | Performed by: FAMILY MEDICINE

## 2019-12-02 RX ORDER — SODIUM CHLORIDE 0.9 % (FLUSH) 0.9 %
10 SYRINGE (ML) INJECTION AS NEEDED
Status: DISCONTINUED | OUTPATIENT
Start: 2019-12-02 | End: 2019-12-08 | Stop reason: HOSPADM

## 2019-12-02 RX ORDER — METHYLPREDNISOLONE SODIUM SUCCINATE 125 MG/2ML
125 INJECTION, POWDER, LYOPHILIZED, FOR SOLUTION INTRAMUSCULAR; INTRAVENOUS ONCE
Status: COMPLETED | OUTPATIENT
Start: 2019-12-02 | End: 2019-12-02

## 2019-12-02 RX ORDER — IPRATROPIUM BROMIDE AND ALBUTEROL SULFATE 2.5; .5 MG/3ML; MG/3ML
3 SOLUTION RESPIRATORY (INHALATION) ONCE
Status: COMPLETED | OUTPATIENT
Start: 2019-12-02 | End: 2019-12-02

## 2019-12-02 RX ORDER — SODIUM CHLORIDE 0.9 % (FLUSH) 0.9 %
10 SYRINGE (ML) INJECTION EVERY 12 HOURS SCHEDULED
Status: DISCONTINUED | OUTPATIENT
Start: 2019-12-02 | End: 2019-12-08 | Stop reason: HOSPADM

## 2019-12-02 RX ORDER — CLOPIDOGREL BISULFATE 75 MG/1
75 TABLET ORAL DAILY
Status: DISCONTINUED | OUTPATIENT
Start: 2019-12-03 | End: 2019-12-08 | Stop reason: HOSPADM

## 2019-12-02 RX ORDER — IPRATROPIUM BROMIDE AND ALBUTEROL SULFATE 2.5; .5 MG/3ML; MG/3ML
3 SOLUTION RESPIRATORY (INHALATION)
Status: DISCONTINUED | OUTPATIENT
Start: 2019-12-03 | End: 2019-12-08 | Stop reason: HOSPADM

## 2019-12-02 RX ORDER — BUDESONIDE AND FORMOTEROL FUMARATE DIHYDRATE 80; 4.5 UG/1; UG/1
2 AEROSOL RESPIRATORY (INHALATION)
Status: DISCONTINUED | OUTPATIENT
Start: 2019-12-02 | End: 2019-12-08 | Stop reason: HOSPADM

## 2019-12-02 RX ORDER — METHYLPREDNISOLONE SODIUM SUCCINATE 125 MG/2ML
60 INJECTION, POWDER, LYOPHILIZED, FOR SOLUTION INTRAMUSCULAR; INTRAVENOUS EVERY 8 HOURS
Status: DISCONTINUED | OUTPATIENT
Start: 2019-12-03 | End: 2019-12-05

## 2019-12-02 RX ORDER — FERROUS SULFATE TAB EC 324 MG (65 MG FE EQUIVALENT) 324 (65 FE) MG
324 TABLET DELAYED RESPONSE ORAL 2 TIMES DAILY WITH MEALS
Status: DISCONTINUED | OUTPATIENT
Start: 2019-12-02 | End: 2019-12-08 | Stop reason: HOSPADM

## 2019-12-02 RX ADMIN — METHYLPREDNISOLONE SODIUM SUCCINATE 125 MG: 125 INJECTION, POWDER, FOR SOLUTION INTRAMUSCULAR; INTRAVENOUS at 20:03

## 2019-12-02 RX ADMIN — IPRATROPIUM BROMIDE AND ALBUTEROL SULFATE 3 ML: 2.5; .5 SOLUTION RESPIRATORY (INHALATION) at 20:04

## 2019-12-02 RX ADMIN — BUDESONIDE AND FORMOTEROL FUMARATE DIHYDRATE 2 PUFF: 80; 4.5 AEROSOL RESPIRATORY (INHALATION) at 22:35

## 2019-12-02 NOTE — ED NOTES
Pt presents to the ED with c/o difficulty breathing for the last 4 weeks. Pt was seen at an urgent care and was given antibiotics and steroid with little to no relief. Pt reports he has a productive cough with green sputum. Pt has a hx of COPD and right lung has been removed due to lung cancer. Pt denies chest pain but has an extensive cardiac hx.      Halie Lazo, RN  12/02/19 6401

## 2019-12-03 ENCOUNTER — APPOINTMENT (OUTPATIENT)
Dept: CARDIOLOGY | Facility: HOSPITAL | Age: 64
End: 2019-12-03

## 2019-12-03 LAB
BH CV ECHO MEAS - BSA(HAYCOCK): 1.9 M^2
BH CV ECHO MEAS - BSA: 1.9 M^2
BH CV ECHO MEAS - BZI_BMI: 23 KILOGRAMS/M^2
BH CV ECHO MEAS - BZI_METRIC_HEIGHT: 177.8 CM
BH CV ECHO MEAS - BZI_METRIC_WEIGHT: 72.6 KG
BH CV ECHO MEAS - MR MAX PG: 65.3 MMHG
BH CV ECHO MEAS - MR MAX VEL: 404 CM/SEC
LV EF 2D ECHO EST: 55 %
MAXIMAL PREDICTED HEART RATE: 154 BPM
STRESS TARGET HR: 131 BPM

## 2019-12-03 PROCEDURE — 93321 DOPPLER ECHO F-UP/LMTD STD: CPT

## 2019-12-03 PROCEDURE — 93325 DOPPLER ECHO COLOR FLOW MAPG: CPT | Performed by: INTERNAL MEDICINE

## 2019-12-03 PROCEDURE — 93321 DOPPLER ECHO F-UP/LMTD STD: CPT | Performed by: INTERNAL MEDICINE

## 2019-12-03 PROCEDURE — 93308 TTE F-UP OR LMTD: CPT | Performed by: INTERNAL MEDICINE

## 2019-12-03 PROCEDURE — 25010000002 HEPARIN (PORCINE) PER 1000 UNITS: Performed by: HOSPITALIST

## 2019-12-03 PROCEDURE — G0378 HOSPITAL OBSERVATION PER HR: HCPCS

## 2019-12-03 PROCEDURE — 93325 DOPPLER ECHO COLOR FLOW MAPG: CPT

## 2019-12-03 PROCEDURE — 94760 N-INVAS EAR/PLS OXIMETRY 1: CPT

## 2019-12-03 PROCEDURE — 25010000002 METHYLPREDNISOLONE PER 125 MG: Performed by: INTERNAL MEDICINE

## 2019-12-03 PROCEDURE — 94799 UNLISTED PULMONARY SVC/PX: CPT

## 2019-12-03 PROCEDURE — 93308 TTE F-UP OR LMTD: CPT

## 2019-12-03 PROCEDURE — 94640 AIRWAY INHALATION TREATMENT: CPT

## 2019-12-03 RX ORDER — FUROSEMIDE 20 MG/1
20 TABLET ORAL DAILY
Status: DISCONTINUED | OUTPATIENT
Start: 2019-12-03 | End: 2019-12-08 | Stop reason: HOSPADM

## 2019-12-03 RX ORDER — PANTOPRAZOLE SODIUM 40 MG/1
40 TABLET, DELAYED RELEASE ORAL NIGHTLY
Status: DISCONTINUED | OUTPATIENT
Start: 2019-12-03 | End: 2019-12-08 | Stop reason: HOSPADM

## 2019-12-03 RX ORDER — LABETALOL 200 MG/1
200 TABLET, FILM COATED ORAL EVERY 12 HOURS SCHEDULED
Status: DISCONTINUED | OUTPATIENT
Start: 2019-12-03 | End: 2019-12-08 | Stop reason: HOSPADM

## 2019-12-03 RX ORDER — ACETAMINOPHEN 325 MG/1
650 TABLET ORAL EVERY 4 HOURS PRN
Status: DISCONTINUED | OUTPATIENT
Start: 2019-12-03 | End: 2019-12-08 | Stop reason: HOSPADM

## 2019-12-03 RX ORDER — HEPARIN SODIUM 5000 [USP'U]/ML
5000 INJECTION, SOLUTION INTRAVENOUS; SUBCUTANEOUS EVERY 8 HOURS SCHEDULED
Status: DISCONTINUED | OUTPATIENT
Start: 2019-12-03 | End: 2019-12-08 | Stop reason: HOSPADM

## 2019-12-03 RX ADMIN — METHYLPREDNISOLONE SODIUM SUCCINATE 60 MG: 125 INJECTION, POWDER, FOR SOLUTION INTRAMUSCULAR; INTRAVENOUS at 12:24

## 2019-12-03 RX ADMIN — METHYLPREDNISOLONE SODIUM SUCCINATE 60 MG: 125 INJECTION, POWDER, FOR SOLUTION INTRAMUSCULAR; INTRAVENOUS at 20:43

## 2019-12-03 RX ADMIN — FERROUS SULFATE TAB EC 324 MG (65 MG FE EQUIVALENT) 324 MG: 324 (65 FE) TABLET DELAYED RESPONSE at 08:46

## 2019-12-03 RX ADMIN — PANTOPRAZOLE SODIUM 40 MG: 40 TABLET, DELAYED RELEASE ORAL at 22:09

## 2019-12-03 RX ADMIN — BUDESONIDE AND FORMOTEROL FUMARATE DIHYDRATE 2 PUFF: 80; 4.5 AEROSOL RESPIRATORY (INHALATION) at 07:42

## 2019-12-03 RX ADMIN — IPRATROPIUM BROMIDE AND ALBUTEROL SULFATE 3 ML: 2.5; .5 SOLUTION RESPIRATORY (INHALATION) at 20:56

## 2019-12-03 RX ADMIN — FUROSEMIDE 20 MG: 20 TABLET ORAL at 13:10

## 2019-12-03 RX ADMIN — IPRATROPIUM BROMIDE AND ALBUTEROL SULFATE 3 ML: 2.5; .5 SOLUTION RESPIRATORY (INHALATION) at 11:59

## 2019-12-03 RX ADMIN — ACETAMINOPHEN 650 MG: 325 TABLET, FILM COATED ORAL at 10:02

## 2019-12-03 RX ADMIN — ACETAMINOPHEN 650 MG: 325 TABLET, FILM COATED ORAL at 00:40

## 2019-12-03 RX ADMIN — HEPARIN SODIUM 5000 UNITS: 5000 INJECTION INTRAVENOUS; SUBCUTANEOUS at 21:42

## 2019-12-03 RX ADMIN — ACETAMINOPHEN 650 MG: 325 TABLET, FILM COATED ORAL at 06:32

## 2019-12-03 RX ADMIN — SODIUM CHLORIDE, PRESERVATIVE FREE 10 ML: 5 INJECTION INTRAVENOUS at 20:44

## 2019-12-03 RX ADMIN — CLOPIDOGREL BISULFATE 75 MG: 75 TABLET ORAL at 08:46

## 2019-12-03 RX ADMIN — LABETALOL HYDROCHLORIDE 200 MG: 200 TABLET, FILM COATED ORAL at 20:43

## 2019-12-03 RX ADMIN — IPRATROPIUM BROMIDE AND ALBUTEROL SULFATE 3 ML: 2.5; .5 SOLUTION RESPIRATORY (INHALATION) at 15:13

## 2019-12-03 RX ADMIN — HEPARIN SODIUM 5000 UNITS: 5000 INJECTION INTRAVENOUS; SUBCUTANEOUS at 16:10

## 2019-12-03 RX ADMIN — METHYLPREDNISOLONE SODIUM SUCCINATE 60 MG: 125 INJECTION, POWDER, FOR SOLUTION INTRAMUSCULAR; INTRAVENOUS at 03:35

## 2019-12-03 RX ADMIN — ACETAMINOPHEN 650 MG: 325 TABLET, FILM COATED ORAL at 16:38

## 2019-12-03 RX ADMIN — IPRATROPIUM BROMIDE AND ALBUTEROL SULFATE 3 ML: 2.5; .5 SOLUTION RESPIRATORY (INHALATION) at 07:29

## 2019-12-03 RX ADMIN — LABETALOL HYDROCHLORIDE 200 MG: 200 TABLET, FILM COATED ORAL at 13:39

## 2019-12-03 RX ADMIN — BUDESONIDE AND FORMOTEROL FUMARATE DIHYDRATE 2 PUFF: 80; 4.5 AEROSOL RESPIRATORY (INHALATION) at 20:56

## 2019-12-03 RX ADMIN — SODIUM CHLORIDE, PRESERVATIVE FREE 10 ML: 5 INJECTION INTRAVENOUS at 08:47

## 2019-12-03 NOTE — PLAN OF CARE
Problem: NPPV/CPAP (Adult)  Goal: Signs and Symptoms of Listed Potential Problems Will be Absent, Minimized or Managed (NPPV/CPAP)  CPAP is setup in Patient's room. Pt stated he didn't want to wear it tonight. Patient has 3 liters nasal with Sa02 of 95%. Patient will call when ready to wear it.

## 2019-12-03 NOTE — PROGRESS NOTES
[facility] Medicine Services  INPATIENT PROGRESS NOTE    Length of Stay: 0  Date of Admission: 12/2/2019  Primary Care Physician: Preston Dalton MD    Subjective   Chief Complaint: Shortness of breath  HPI: Patient complains of significant shortness of breath and wheezing.  He states that he is not significantly better from admission.    Review of Systems   Constitutional: Positive for activity change and fatigue. Negative for appetite change, chills and fever.   Respiratory: Positive for cough, shortness of breath and wheezing. Negative for chest tightness.    Cardiovascular: Negative for chest pain, palpitations and leg swelling.   Gastrointestinal: Negative for abdominal pain, constipation, diarrhea, nausea and vomiting.   Skin: Negative for wound.   Neurological: Negative for dizziness, weakness, light-headedness, numbness and headaches.        All pertinent negatives and positives are as above. All other systems have been reviewed and are negative unless otherwise stated.     Objective    Temp:  [97.5 °F (36.4 °C)-98.2 °F (36.8 °C)] 97.7 °F (36.5 °C)  Heart Rate:  [] 138  Resp:  [18-20] 18  BP: ()/(56-76) 158/62    Physical Exam   Constitutional: He appears well-developed and well-nourished.   HENT:   Head: Normocephalic and atraumatic.   Eyes: EOM are normal. Pupils are equal, round, and reactive to light.   Neck: Normal range of motion. Neck supple.   Cardiovascular: Normal rate, regular rhythm, normal heart sounds and intact distal pulses. Exam reveals no gallop and no friction rub.   No murmur heard.  Pulmonary/Chest: Accessory muscle usage present. No respiratory distress. He has decreased breath sounds. He has wheezes. He has no rales. He exhibits no tenderness.   Abdominal: Soft. Bowel sounds are normal. He exhibits no distension. There is no tenderness.   Psychiatric: He has a normal mood and affect. His behavior is normal.   Vitals  reviewed.          Results Review:  I have reviewed the labs, radiology results, and diagnostic studies.    Laboratory Data:   Results from last 7 days   Lab Units 12/02/19  1753   SODIUM mmol/L 133*   POTASSIUM mmol/L 4.7   CHLORIDE mmol/L 94*   CO2 mmol/L 29.0   BUN mg/dL 20   CREATININE mg/dL 0.99   GLUCOSE mg/dL 104*   CALCIUM mg/dL 9.3   BILIRUBIN mg/dL 0.4   ALK PHOS U/L 106   ALT (SGPT) U/L 13   AST (SGOT) U/L 23   ANION GAP mmol/L 10.0     Estimated Creatinine Clearance: 70.1 mL/min (by C-G formula based on SCr of 0.99 mg/dL).  Results from last 7 days   Lab Units 12/02/19  1753   MAGNESIUM mg/dL 1.9         Results from last 7 days   Lab Units 12/02/19  1753   WBC 10*3/mm3 9.86   HEMOGLOBIN g/dL 13.9   HEMATOCRIT % 42.5   PLATELETS 10*3/mm3 211           Culture Data:   No results found for: BLOODCX  No results found for: URINECX  No results found for: RESPCX  No results found for: WOUNDCX  No results found for: STOOLCX  No components found for: BODYFLD    Radiology Data:   Imaging Results (Last 24 Hours)     Procedure Component Value Units Date/Time    XR Chest 1 View [[id number]] Collected:  12/02/19 1723     Updated:  12/02/19 1748    Narrative:       PROCEDURE: XR CHEST 1 VW    VIEWS:Single    INDICATION: Shortness of breath    COMPARISON: CXR: 1/15/2016    FINDINGS:       -Postoperative changes typical of right pneumonectomy with  rightward shift of the heart and mediastinal contents is present,  as before. There is hyperinflation of the left lung, extending  across midline. Old right-sided rib fractures appear similar to  prior study      Impression:       Stable postoperative changes status post right pneumonectomy,  without acute abnormality identified      Electronically signed by:  Alecia Valenzuela MD  12/2/2019 5:47 PM CST  Workstation: [id number]          I have reviewed the patient's current medications.     Assessment/Plan     Active Hospital Problems    Diagnosis   • **Acute on chronic respiratory  failure with hypoxia (CMS/[facility])   • COPD exacerbation (CMS/[facility])       Plan:    1.  Acute on chronic respiratory failure with hypoxia: Patient is continuing to require supplemental oxygen.  He does not feel significantly better than on admission.  Echo pending  2.  COPD exacerbation: Continue steroids, nebulizers, supplemental oxygen.  3.  Status post right pneumonectomy.  4.  Obstructive sleep apnea: Continue home CPAP.  5.  Coronary artery disease: Continue home medication.  6.  Peripheral vascular disease  7.  DVT prophylaxis: Heparin.          Discharge Planning: I expect patient to be discharged to home in 2-3 days.        This document has been electronically signed by Jorge Garay MD on December 3, 2019 1:57 PM

## 2019-12-03 NOTE — H&P
History and Physical  Russ Vera MD  Hospitalist    Date of admission: 12/2/2019    Patient Care Team:  Preston Dalton MD as PCP - General    Chief complaint   Chief Complaint   Patient presents with   • Shortness of Breath     Subjective     Patient is a 64 y.o. male admitted for worsening dyspnea, cough, wheezing, markedly reduced effort capacity. He is chronically short of breath given his severe COPD/emphysema and R pneumectomy but his current symptoms are worse than usual.    History  Past Medical History:   Diagnosis Date   • Carotid artery stenosis    • COPD (chronic obstructive pulmonary disease) (CMS/HCC)    • Coronary atherosclerosis    • Hypercholesterolemia    • Hypertension    • Malignant neoplasm of right lung (CMS/HCC)    • JUAN on CPAP    • PVD (peripheral vascular disease) (CMS/HCC)    • Renal artery stenosis (CMS/HCC)    • Sleep apnea    • Squamous cell carcinoma of lung (CMS/HCC)      Past Surgical History:   Procedure Laterality Date   • CARDIAC CATHETERIZATION     • CAROTID ENDARTERECTOMY     • CORONARY ARTERY BYPASS GRAFT     • ENDOSCOPY N/A 8/29/2019   • ESOPHAGOSCOPY / EGD     • HIP ARTHROPLASTY     • LUNG REMOVAL, TOTAL     • MS THROMBOENDARTECTMY NECK,NECK INCIS Left 3/14/2017   • THORACOTOMY     • TRANSESOPHAGEAL ECHOCARDIOGRAM (LEIDA)       Family History   Problem Relation Age of Onset   • Heart disease Mother    • Hypertension Father    • Cancer Sister      Social History     Tobacco Use   • Smoking status: Former Smoker     Years: 35.00   • Smokeless tobacco: Never Used   Substance Use Topics   • Alcohol use: No   • Drug use: No     Medications Prior to Admission   Medication Sig Dispense Refill Last Dose   • albuterol (PROVENTIL HFA;VENTOLIN HFA) 108 (90 BASE) MCG/ACT inhaler Inhale 2 puffs every 4 (four) hours as needed for wheezing.   8/28/2019 at Unknown time   • amLODIPine (NORVASC) 10 MG tablet TAKE ONE TABLET BY MOUTH DAILY 90 tablet 2 8/29/2019 at Unknown time   •  aspirin 81 MG EC tablet Take 81 mg by mouth Daily. Instructed to cont taking   8/26/2019   • budesonide-formoterol (SYMBICORT) 80-4.5 MCG/ACT inhaler Inhale 2 puffs.   8/28/2019 at Unknown time   • Cholecalciferol (VITAMIN D3) 5000 units capsule capsule Take 5,000 Units by mouth Every Other Day. Takes Monday Wednesday Friday 8/28/2019 at Unknown time   • clopidogrel (PLAVIX) 75 MG tablet Take 1 tablet by mouth Daily. 90 tablet 9 8/26/2019   • ferrous sulfate 325 (65 FE) MG tablet One tablet BID 60 tablet 3 8/28/2019 at Unknown time   • furosemide (LASIX) 40 MG tablet TAKE 1/2 TABLET BY MOUTH DAILY 30 tablet 0    • labetalol (NORMODYNE) 200 MG tablet TAKE ONE TABLET BY MOUTH TWICE A DAY 60 tablet 9    • lidocaine (LIDODERM) 5 % Place 1 patch on the skin Daily. Remove & Discard patch within 12 hours or as directed by MD 30 patch 0 Unknown at Unknown time   • pantoprazole (PROTONIX) 40 MG EC tablet    8/28/2019 at Unknown time     Allergies:  Penicillins    Review of Systems  Review of Systems   Constitutional: Positive for fatigue. Negative for fever.   Respiratory: Positive for cough, shortness of breath and wheezing.    Cardiovascular: Negative for chest pain, palpitations and leg swelling.   Gastrointestinal: Negative for abdominal distention, abdominal pain, blood in stool, diarrhea, nausea and vomiting.   Genitourinary: Negative for enuresis, frequency, hematuria, penile swelling and urgency.   Musculoskeletal: Positive for arthralgias. Negative for back pain, gait problem and joint swelling.   Skin: Positive for pallor. Negative for color change and rash.   Neurological: Positive for weakness. Negative for seizures, syncope and headaches.   Psychiatric/Behavioral: Negative for agitation, behavioral problems and confusion.   All other systems reviewed and are negative.      Objective     Vital Signs  Temp:  [97.8 °F (36.6 °C)] 97.8 °F (36.6 °C)  Heart Rate:  [70-78] 74  Resp:  [18-20] 18  BP: ()/(56-68)  137/68    Physical Exam:  Physical Exam   Constitutional: He is oriented to person, place, and time. He appears ill. He appears distressed.   HENT:   Head: Normocephalic and atraumatic.   Eyes: EOM are normal. Pupils are equal, round, and reactive to light. No scleral icterus.   Neck: Normal range of motion. Neck supple.   Cardiovascular: Normal rate and regular rhythm.   Pulmonary/Chest: No stridor. He is in respiratory distress. He has wheezes.   Decreased air entry R lung   Abdominal: Soft. Bowel sounds are normal. He exhibits no distension and no mass. There is no tenderness. There is no guarding.   Musculoskeletal: Normal range of motion. He exhibits no edema or tenderness.   Neurological: He is alert and oriented to person, place, and time. He displays normal reflexes. No cranial nerve deficit. Coordination normal.   Skin: Skin is warm and dry. No rash noted. No erythema. There is pallor.   Psychiatric: He has a normal mood and affect. His behavior is normal.   Vitals reviewed.      Results Review:   Lab Results (last 24 hours)     Procedure Component Value Units Date/Time    Influenza Antigen, Rapid - Swab, Nasopharynx [[id number]]  (Normal) Collected:  12/02/19 1946    Specimen:  Swab from Nasopharynx Updated:  12/02/19 2009     Influenza A Ag, EIA Negative     Influenza B Ag, EIA Negative    CK [[id number]]  (Normal) Collected:  12/02/19 1753    Specimen:  Blood Updated:  12/02/19 1937     Creatine Kinase 197 U/L     Magnesium [[id number]]  (Normal) Collected:  12/02/19 1753    Specimen:  Blood Updated:  12/02/19 1937     Magnesium 1.9 mg/dL     [place] [facility] [[id number]] Collected:  12/02/19 1753    Specimen:  Blood Updated:  12/02/19 1900    Narrative:       The following orders were created for panel order [place] [facility].  Procedure                               Abnormality         Status                     ---------                               -----------         ------                     Light Blue  Top[[id number]]                                   Final result               Green Top (Gel)[[id number]]                                  Final result               Lavender Top[[id number]]                                     Final result               Gold Top - SST[[id number]]                                   Final result                 Please view results for these tests on the individual orders.    Lavender Top [[id number]] Collected:  12/02/19 1753    Specimen:  Blood Updated:  12/02/19 1900     Extra Tube hold for add-on     Comment: Auto resulted       Light Blue Top [[id number]] Collected:  12/02/19 1753    Specimen:  Blood Updated:  12/02/19 1900     Extra Tube hold for add-on     Comment: Auto resulted       Green Top (Gel) [[id number]] Collected:  12/02/19 1753    Specimen:  Blood Updated:  12/02/19 1900     Extra Tube Hold for add-ons.     Comment: Auto resulted.       Gold Top - SST [[id number]] Collected:  12/02/19 1753    Specimen:  Blood Updated:  12/02/19 1900     Extra Tube Hold for add-ons.     Comment: Auto resulted.       Comprehensive Metabolic Panel [[id number]]  (Abnormal) Collected:  12/02/19 1753    Specimen:  Blood Updated:  12/02/19 1821     Glucose 104 mg/dL      BUN 20 mg/dL      Creatinine 0.99 mg/dL      Sodium 133 mmol/L      Potassium 4.7 mmol/L      Chloride 94 mmol/L      CO2 29.0 mmol/L      Calcium 9.3 mg/dL      Total Protein 7.1 g/dL      Albumin 4.20 g/dL      ALT (SGPT) 13 U/L      AST (SGOT) 23 U/L      Alkaline Phosphatase 106 U/L      Total Bilirubin 0.4 mg/dL      eGFR Non African Amer 76 mL/min/1.73      Globulin 2.9 gm/dL      A/G Ratio 1.4 g/dL      BUN/Creatinine Ratio 20.2     Anion Gap 10.0 mmol/L     Narrative:       GFR Normal >60  Chronic Kidney Disease <60  Kidney Failure <15    Troponin [[id number]]  (Normal) Collected:  12/02/19 1753    Specimen:  Blood Updated:  12/02/19 1820     Troponin T <0.010 ng/mL     Narrative:       Troponin T Reference Range:  <= 0.03  ng/mL-   Negative for AMI  >0.03 ng/mL-     Abnormal for myocardial necrosis.  Clinicians would have to utilize clinical acumen, EKG, Troponin and serial changes to determine if it is an Acute Myocardial Infarction or myocardial injury due to an underlying chronic condition.     BNP [[id number]]  (Abnormal) Collected:  12/02/19 1753    Specimen:  Blood Updated:  12/02/19 1819     proBNP 3,321.0 pg/mL     Narrative:       Among patients with dyspnea, NT-proBNP is highly sensitive for the detection of acute congestive heart failure. In addition NT-proBNP of <300 pg/ml effectively rules out acute congestive heart failure with 99% negative predictive value.    CBC & Differential [[id number]] Collected:  12/02/19 1753    Specimen:  Blood Updated:  12/02/19 1801    Narrative:       The following orders were created for panel order CBC & Differential.  Procedure                               Abnormality         Status                     ---------                               -----------         ------                     CBC Auto Differential[[id number]]        Abnormal            Final result                 Please view results for these tests on the individual orders.    CBC Auto Differential [[id number]]  (Abnormal) Collected:  12/02/19 1753    Specimen:  Blood Updated:  12/02/19 1801     WBC 9.86 10*3/mm3      RBC 4.96 10*6/mm3      Hemoglobin 13.9 g/dL      Hematocrit 42.5 %      MCV 85.7 fL      MCH 28.0 pg      MCHC 32.7 g/dL      RDW 14.1 %      RDW-SD 43.8 fl      MPV 12.1 fL      Platelets 211 10*3/mm3      Neutrophil % 81.5 %      Lymphocyte % 12.0 %      Monocyte % 5.8 %      Eosinophil % 0.1 %      Basophil % 0.2 %      Immature Grans % 0.4 %      Neutrophils, Absolute 8.04 10*3/mm3      Lymphocytes, Absolute 1.18 10*3/mm3      Monocytes, Absolute 0.57 10*3/mm3      Eosinophils, Absolute 0.01 10*3/mm3      Basophils, Absolute 0.02 10*3/mm3      Immature Grans, Absolute 0.04 10*3/mm3      nRBC 0.0 /100 WBC            Imaging Results (Last 24 Hours)     Procedure Component Value Units Date/Time    XR Chest 1 View [[id number]] Collected:  12/02/19 1723     Updated:  12/02/19 1748    Narrative:       PROCEDURE: XR CHEST 1 VW    VIEWS:Single    INDICATION: Shortness of breath    COMPARISON: CXR: 1/15/2016    FINDINGS:       -Postoperative changes typical of right pneumonectomy with  rightward shift of the heart and mediastinal contents is present,  as before. There is hyperinflation of the left lung, extending  across midline. Old right-sided rib fractures appear similar to  prior study      Impression:       Stable postoperative changes status post right pneumonectomy,  without acute abnormality identified      Electronically signed by:  Alecia Valenzuela MD  12/2/2019 5:47 PM CST  Workstation: [id number]          Assessment/Plan       Acute on chronic respiratory failure with hypoxia (CMS/HCC)    COPD exacerbation (CMS/HCC)    We'll continue to provide him with the supplemental Oxygen, nebulized treatments, IV steroids, supportive care. We'll obtain a cardiac Echo in AM.    Russ Vera MD  12/02/19  9:22 PM

## 2019-12-03 NOTE — PLAN OF CARE
Problem: Patient Care Overview  Goal: Plan of Care Review  Outcome: Ongoing (interventions implemented as appropriate)   12/03/19 0227   Coping/Psychosocial   Plan of Care Reviewed With patient   Plan of Care Review   Progress no change   OTHER   Outcome Summary New admit from ER       Problem: Breathing Pattern Ineffective (Adult)  Goal: Identify Related Risk Factors and Signs and Symptoms  Outcome: Outcome(s) achieved Date Met: 12/03/19    Goal: Effective Oxygenation/Ventilation  Outcome: Ongoing (interventions implemented as appropriate)    Goal: Anxiety/Fear Reduction  Outcome: Ongoing (interventions implemented as appropriate)      Problem: Chronic Obstructive Pulmonary Disease (Adult)  Goal: Signs and Symptoms of Listed Potential Problems Will be Absent, Minimized or Managed (Chronic Obstructive Pulmonary Disease)  Outcome: Ongoing (interventions implemented as appropriate)

## 2019-12-03 NOTE — PROGRESS NOTES
Discharge Planning Assessment  [facility]     Patient Name: Juanito Pruitt  MRN: [id number]  Today's Date: 12/3/2019    Admit Date: 12/2/2019    Discharge Needs Assessment     Row Name 12/03/19 1033       Living Environment    Lives With  spouse    Name(s) of Who Lives With Patient  Carol Pruitt (spouse)    Current Living Arrangements  home/apartment/condo Contact information on face sheet confirmed with patient.     Primary Care Provided by  self    Provides Primary Care For  no one    Family Caregiver if Needed  spouse    Family Caregiver Names  Carol Pruitt (spouse)    Able to Return to Prior Arrangements  yes       Resource/Environmental Concerns    Resource/Environmental Concerns  none    Transportation Concerns  car, none       Transition Planning    Patient/Family Anticipates Transition to  home with family    Patient/Family Anticipated Services at Transition  durable medical equipment Patient/spouse voiced interest in a home nebulizer machine.     Transportation Anticipated  car, drives self;family or friend will provide       Discharge Needs Assessment    Readmission Within the Last 30 Days  no previous admission in last 30 days    Concerns to be Addressed  denies needs/concerns at this time    Equipment Currently Used at Home  bipap/cpap;bath bench;walker, rolling;commode;other (see comments) Patient uses [facility] for home CPAP needs.     Anticipated Changes Related to Illness  none    Equipment Needed After Discharge  nebulizer;other (see comments) Patient/spouse voiced interest in home nebulizer machine. If provider is agreeable to order, requests [facility] for DME needs.     Provided post acute provider list?  Yes    Post Acute Provider Lists  Home Health    Post Acuite Provider List  Delivered    Delivered To  Patient    Method of Delivery  In person        Discharge Plan     Row Name 12/03/19 1040       Plan    Plan Comments  LACE complete. Patient is agreeable to  services pending  provider identifies need. List provided. No preference voiced at this time. Spouse stated patient had received services in the past and used [facility]. VIC left a note for the provider re: patient/spouse interest in home neb machine. If DME is needed, patient uses [facility] for home DME needs. Continue with medical management.....Joanna Valverde LSW    Row Name 12/03/19 0959       Plan    Plan Comments  Team rounds - Continue steroids, nebs, and supplemental oxygen. Oxgen at 2L this am, baseline is room air. Patient had echo earlier this am with results pending. SYDNI Stewart        Destination      No service coordination in this encounter.      Durable Medical Equipment      No service coordination in this encounter.      Dialysis/Infusion      No service coordination in this encounter.      Home Medical Care      No service coordination in this encounter.      Therapy      No service coordination in this encounter.      Community Resources      No service coordination in this encounter.        Expected Discharge Date and Time     Expected Discharge Date Expected Discharge Time    Dec 5, 2019         Demographic Summary     Row Name 12/03/19 1030       General Information    Admission Type  observation    Arrived From  emergency department    Referral Source  high risk screening LACE score 7    Preferred Language  English     Used During This Interaction  no       Contact Information    Contact Information Obtained for          Functional Status     Row Name 12/03/19 1030       Functional Status    Usual Activity Tolerance  good    Current Activity Tolerance  good    Functional Status Comments  Patient is idependent with ADL's prior to admission.        Functional Status, IADL    Medications  independent Pharmacy, [facility], and rx coverage confirmed with patient.    Meal Preparation  assistive person    Housekeeping  assistive person    Laundry  assistive person    Shopping   assistive person       Mental Status Summary    Recent Changes in Mental Status/Cognitive Functioning  no changes       Employment/    Employment Status  employed full time        Psychosocial    No documentation.       Abuse/Neglect    No documentation.       Legal    No documentation.       Substance Abuse    No documentation.       Patient Forms    No documentation.           RAHUL Norris

## 2019-12-03 NOTE — ED PROVIDER NOTES
Subjective   Patient states that over the past several days his shortness of breath has been progressing.  He is at the point where he becomes hypoxic after taking several steps.  He does use CPAP at home, but does not do home oxygen.  He has a history of lung cancer with a right pneumonectomy as well as coronary artery bypass graft.        Shortness of Breath   Severity:  Moderate  Onset quality:  Gradual  Duration:  3 days  Timing:  Constant  Progression:  Worsening  Chronicity:  New  Context: activity    Relieved by:  Oxygen and lying down  Worsened by:  Activity  Ineffective treatments:  None tried  Associated symptoms: cough and wheezing    Associated symptoms: no abdominal pain, no chest pain, no diaphoresis, no ear pain, no fever, no headaches, no neck pain, no rash, no sore throat and no vomiting    Risk factors: no obesity and no tobacco use (fromer smoker)        Review of Systems   Constitutional: Positive for activity change and fatigue. Negative for appetite change, chills, diaphoresis and fever.   HENT: Positive for congestion. Negative for ear discharge, ear pain, nosebleeds, rhinorrhea, sinus pressure, sore throat and trouble swallowing.    Eyes: Negative for discharge and redness.   Respiratory: Positive for cough, shortness of breath and wheezing. Negative for apnea and chest tightness.    Cardiovascular: Negative for chest pain.   Gastrointestinal: Negative for abdominal pain, diarrhea, nausea and vomiting.   Endocrine: Negative for polyuria.   Genitourinary: Negative for dysuria, frequency and urgency.   Musculoskeletal: Negative for myalgias and neck pain.   Skin: Negative for color change and rash.   Allergic/Immunologic: Negative for immunocompromised state.   Neurological: Positive for dizziness and weakness. Negative for seizures, syncope, light-headedness and headaches.   Hematological: Negative for adenopathy. Does not bruise/bleed easily.   Psychiatric/Behavioral: Negative for behavioral  problems and confusion.   All other systems reviewed and are negative.      Past Medical History:   Diagnosis Date   • Allergic rhinitis    • Atherosclerosis of native arteries of the extremities with ulceration (CMS/HCC)     Bilateral legs   • Benign hypertension    • Carotid artery stenosis    • Coagulopathy (CMS/HCC)     on plavix and asa (instructed to continue per md)   • Coronary atherosclerosis    • Essential hypertension    • Hypercholesterolemia    • Inguinal pain     small right groin seroma   • Malignant neoplasm of lung (CMS/HCC)     Right upper lobe mass suspicious for lung cancer   • JUAN on CPAP    • PVD (peripheral vascular disease) (CMS/HCC)    • Renal artery stenosis (CMS/HCC)    • Simple chronic bronchitis (CMS/HCC)    • Sleep apnea    • SOB (shortness of breath)    • Squamous cell carcinoma of lung (CMS/HCC)        Allergies   Allergen Reactions   • Penicillins Itching     Rash,itching       Past Surgical History:   Procedure Laterality Date   • CARDIAC CATHETERIZATION  07/12/2011    Multi-vessel coronary artery disease with critical lesion noted in the LAD coronary artery, left circumflex coronary artery and right coronary artery as described above. The LAD appeared to be chronically occluded    • CAROTID ENDARTERECTOMY  12/03/2013    Right carotid endarterectomy. Carotid cerebral arteriogram   • CORONARY ARTERY BYPASS GRAFT  07/14/2011    LIMA->LAD SVG->OM2 SVG->OM3 SVG->steve branch   • ENDOSCOPY N/A 8/29/2019    Procedure: ESOPHAGOGASTRODUODENOSCOPY;  Surgeon: Minesh Moya DO;  Location: [facility] ENDOSCOPY;  Service: Gastroenterology   • ESOPHAGOSCOPY / EGD  12/06/2011    With tube-Normal hypopharynx, GE junction, Duodenum, symmetrical & patent pylorus. Tongue of columnar epithelium that could be a Ivy's esophagus present. Multiple biopsies performed. Chronic gastritis in antrum. Biopsy taken   • HIP SURGERY  01/18/2016    Right total hip arthroplasty anterior approach.   • JOINT  REPLACEMENT Right     hip; more than 2 years ago   • LUNG REMOVAL, TOTAL  07/13/2016    Right thoracotomy with Carinal Pneumonectomy, Thoracic & Mediastinal Lymphadenetomy, Fiberoptic Bronchoscopy   • MI THROMBOENDARTECTMY NECK,NECK INCIS Left 3/14/2017    Procedure: CAROTID ENDARTERECTOMY, ARTERIOGRAM ;  Surgeon: Brayden Ray MD;  Location: [facility];  Service: Vascular   • THORACOTOMY  07/13/2016    Right thoracotomy with carinal pneumonectomy.Thoracic and mediastinal lymphadenectomy.Fiberoptic bronchoscopy   • TRANSESOPHAGEAL ECHOCARDIOGRAM (LEIDA)  07/12/2011    Without color flow-LV dysfunction with an EF of 40-45% with hypokinesis/akinesis of the distal septum and apex. Mild left atrial enlargement. No significant valvular abnormalities noted. No pericardial effusion noted       Family History   Problem Relation Age of Onset   • Heart disease Mother    • Other Mother         ischemic heart disease   • Hypertension Mother    • Heart disease Father    • Other Father         ischemic heart disease   • Hypertension Father    • Coronary artery disease Other    • Cancer Sister        Social History     Socioeconomic History   • Marital status:      Spouse name: Not on file   • Number of children: Not on file   • Years of education: Not on file   • Highest education level: Not on file   Tobacco Use   • Smoking status: Former Smoker     Years: 35.00   • Smokeless tobacco: Never Used   Substance and Sexual Activity   • Alcohol use: No   • Drug use: No   • Sexual activity: Defer           Objective   Physical Exam   Constitutional: He is oriented to person, place, and time. He appears well-developed and well-nourished.   HENT:   Head: Normocephalic and atraumatic.   Nose: Nose normal.   Mouth/Throat: Oropharynx is clear and moist.   Eyes: Conjunctivae and EOM are normal. Pupils are equal, round, and reactive to light. Right eye exhibits no discharge. Left eye exhibits no discharge. No scleral icterus.    Neck: Normal range of motion. Neck supple. No tracheal deviation present.   Cardiovascular: Normal rate, regular rhythm and normal heart sounds.   No murmur heard.  Pulmonary/Chest: Accessory muscle usage present. No stridor. No respiratory distress. He has decreased breath sounds. He has wheezes. He has rhonchi. He has no rales.   Abdominal: Soft. Bowel sounds are normal. He exhibits no distension and no mass. There is no tenderness. There is no rebound and no guarding.   Musculoskeletal: He exhibits no edema.   Neurological: He is alert and oriented to person, place, and time. Coordination normal.   Skin: Skin is warm and dry. No rash noted. No erythema.   Psychiatric: He has a normal mood and affect. His behavior is normal. Thought content normal.   Nursing note and vitals reviewed.      Procedures           ED Course          Labs Reviewed   COMPREHENSIVE METABOLIC PANEL - Abnormal; Notable for the following components:       Result Value    Glucose 104 (*)     Sodium 133 (*)     Chloride 94 (*)     All other components within normal limits    Narrative:     GFR Normal >60  Chronic Kidney Disease <60  Kidney Failure <15   BNP (IN-HOUSE) - Abnormal; Notable for the following components:    proBNP 3,321.0 (*)     All other components within normal limits    Narrative:     Among patients with dyspnea, NT-proBNP is highly sensitive for the detection of acute congestive heart failure. In addition NT-proBNP of <300 pg/ml effectively rules out acute congestive heart failure with 99% negative predictive value.   CBC WITH AUTO DIFFERENTIAL - Abnormal; Notable for the following components:    MPV 12.1 (*)     Neutrophil % 81.5 (*)     Lymphocyte % 12.0 (*)     Eosinophil % 0.1 (*)     Neutrophils, Absolute 8.04 (*)     All other components within normal limits   TROPONIN (IN-HOUSE) - Normal    Narrative:     Troponin T Reference Range:  <= 0.03 ng/mL-   Negative for AMI  >0.03 ng/mL-     Abnormal for myocardial  necrosis.  Clinicians would have to utilize clinical acumen, EKG, Troponin and serial changes to determine if it is an Acute Myocardial Infarction or myocardial injury due to an underlying chronic condition.    INFLUENZA ANTIGEN, RAPID   RAINBOW DRAW    Narrative:     The following orders were created for panel order Bear Draw.  Procedure                               Abnormality         Status                     ---------                               -----------         ------                     Light Blue Top[[id number]]                                   Final result               Green Top (Gel)[[id number]]                                  Final result               Lavender Top[[id number]]                                     Final result               Gold Top - SST[[id number]]                                   Final result                 Please view results for these tests on the individual orders.   CK   MAGNESIUM   CBC AND DIFFERENTIAL    Narrative:     The following orders were created for panel order CBC & Differential.  Procedure                               Abnormality         Status                     ---------                               -----------         ------                     CBC Auto Differential[[id number]]        Abnormal            Final result                 Please view results for these tests on the individual orders.   LIGHT BLUE TOP   GREEN TOP   LAVENDER TOP   GOLD TOP - SST       XR Chest 1 View   Final Result   Stable postoperative changes status post right pneumonectomy,   without acute abnormality identified         Electronically signed by:  Alecia Valenzuela MD  12/2/2019 5:47 PM [facility]   Workstation: [id number]                    [facility]    Final diagnoses:   COPD exacerbation (CMS/HCC)   Acute on chronic congestive heart failure, unspecified heart failure type (CMS/HCC)   Hypotension, unspecified hypotension type              Emeterio Washington MD  12/02/19 1935

## 2019-12-04 PROCEDURE — 94799 UNLISTED PULMONARY SVC/PX: CPT

## 2019-12-04 PROCEDURE — 25010000002 METHYLPREDNISOLONE PER 125 MG: Performed by: INTERNAL MEDICINE

## 2019-12-04 PROCEDURE — 94760 N-INVAS EAR/PLS OXIMETRY 1: CPT

## 2019-12-04 PROCEDURE — 25010000002 HEPARIN (PORCINE) PER 1000 UNITS: Performed by: HOSPITALIST

## 2019-12-04 RX ADMIN — SODIUM CHLORIDE, PRESERVATIVE FREE 10 ML: 5 INJECTION INTRAVENOUS at 08:24

## 2019-12-04 RX ADMIN — FERROUS SULFATE TAB EC 324 MG (65 MG FE EQUIVALENT) 324 MG: 324 (65 FE) TABLET DELAYED RESPONSE at 17:23

## 2019-12-04 RX ADMIN — IPRATROPIUM BROMIDE AND ALBUTEROL SULFATE 3 ML: 2.5; .5 SOLUTION RESPIRATORY (INHALATION) at 19:15

## 2019-12-04 RX ADMIN — BUDESONIDE AND FORMOTEROL FUMARATE DIHYDRATE 2 PUFF: 80; 4.5 AEROSOL RESPIRATORY (INHALATION) at 10:28

## 2019-12-04 RX ADMIN — FERROUS SULFATE TAB EC 324 MG (65 MG FE EQUIVALENT) 324 MG: 324 (65 FE) TABLET DELAYED RESPONSE at 08:23

## 2019-12-04 RX ADMIN — CLOPIDOGREL BISULFATE 75 MG: 75 TABLET ORAL at 08:24

## 2019-12-04 RX ADMIN — LABETALOL HYDROCHLORIDE 200 MG: 200 TABLET, FILM COATED ORAL at 21:19

## 2019-12-04 RX ADMIN — ACETAMINOPHEN 650 MG: 325 TABLET, FILM COATED ORAL at 11:19

## 2019-12-04 RX ADMIN — PANTOPRAZOLE SODIUM 40 MG: 40 TABLET, DELAYED RELEASE ORAL at 21:19

## 2019-12-04 RX ADMIN — HEPARIN SODIUM 5000 UNITS: 5000 INJECTION INTRAVENOUS; SUBCUTANEOUS at 05:06

## 2019-12-04 RX ADMIN — IPRATROPIUM BROMIDE AND ALBUTEROL SULFATE 3 ML: 2.5; .5 SOLUTION RESPIRATORY (INHALATION) at 10:19

## 2019-12-04 RX ADMIN — METHYLPREDNISOLONE SODIUM SUCCINATE 60 MG: 125 INJECTION, POWDER, FOR SOLUTION INTRAMUSCULAR; INTRAVENOUS at 12:47

## 2019-12-04 RX ADMIN — METHYLPREDNISOLONE SODIUM SUCCINATE 60 MG: 125 INJECTION, POWDER, FOR SOLUTION INTRAMUSCULAR; INTRAVENOUS at 05:06

## 2019-12-04 RX ADMIN — BUDESONIDE AND FORMOTEROL FUMARATE DIHYDRATE 2 PUFF: 80; 4.5 AEROSOL RESPIRATORY (INHALATION) at 19:18

## 2019-12-04 RX ADMIN — LABETALOL HYDROCHLORIDE 200 MG: 200 TABLET, FILM COATED ORAL at 08:23

## 2019-12-04 RX ADMIN — IPRATROPIUM BROMIDE AND ALBUTEROL SULFATE 3 ML: 2.5; .5 SOLUTION RESPIRATORY (INHALATION) at 14:31

## 2019-12-04 RX ADMIN — METHYLPREDNISOLONE SODIUM SUCCINATE 60 MG: 125 INJECTION, POWDER, FOR SOLUTION INTRAMUSCULAR; INTRAVENOUS at 21:19

## 2019-12-04 RX ADMIN — HEPARIN SODIUM 5000 UNITS: 5000 INJECTION INTRAVENOUS; SUBCUTANEOUS at 21:20

## 2019-12-04 RX ADMIN — SODIUM CHLORIDE, PRESERVATIVE FREE 10 ML: 5 INJECTION INTRAVENOUS at 21:20

## 2019-12-04 RX ADMIN — HEPARIN SODIUM 5000 UNITS: 5000 INJECTION INTRAVENOUS; SUBCUTANEOUS at 14:19

## 2019-12-04 RX ADMIN — FUROSEMIDE 20 MG: 20 TABLET ORAL at 08:24

## 2019-12-04 RX ADMIN — ACETAMINOPHEN 650 MG: 325 TABLET, FILM COATED ORAL at 06:52

## 2019-12-04 NOTE — PLAN OF CARE
Problem: Patient Care Overview  Goal: Plan of Care Review  Outcome: Ongoing (interventions implemented as appropriate)   12/03/19 3130   Coping/Psychosocial   Plan of Care Reviewed With patient   Plan of Care Review   Progress no change   OTHER   Outcome Summary ECHO performed today       Problem: Breathing Pattern Ineffective (Adult)  Goal: Effective Oxygenation/Ventilation  Outcome: Ongoing (interventions implemented as appropriate)    Goal: Anxiety/Fear Reduction  Outcome: Ongoing (interventions implemented as appropriate)      Problem: Chronic Obstructive Pulmonary Disease (Adult)  Goal: Signs and Symptoms of Listed Potential Problems Will be Absent, Minimized or Managed (Chronic Obstructive Pulmonary Disease)  Outcome: Ongoing (interventions implemented as appropriate)      Problem: NPPV/CPAP (Adult)  Goal: Signs and Symptoms of Listed Potential Problems Will be Absent, Minimized or Managed (NPPV/CPAP)  Outcome: Ongoing (interventions implemented as appropriate)

## 2019-12-04 NOTE — PAYOR COMM NOTE
"Sherie Gibson  [facility]  (P)[phone]  (F)[phone]    Ref#[id number]        Juanito Lora (64 y.o. Male)     Date of Birth Social Security Number Address Home Phone MRN    [id number]  [id number] [facility] [address] [id number]    Bahai Marital Status          None        Admission Date Admission Type Admitting Provider Attending Provider Department, Room/Bed    12/2/19 Emergency Jorge Garay MD Kaldas, Amir I, MD [address], 326/1    Discharge Date Discharge Disposition Discharge Destination                       Attending Provider:  Greg Mayorga MD    Allergies:  Penicillins    Isolation:  None   Infection:  None   Code Status:  CPR    Ht:  177.8 cm (70\")   Wt:  66.1 kg (145 lb 12.8 oz)    Admission Cmt:  None   Principal Problem:  Acute on chronic respiratory failure with hypoxia (CMS/HCC) [J96.21]                 Active Insurance as of 12/2/2019     Primary Coverage     Payor Plan Insurance Group Employer/Plan Group    [facility] [id number]     Payor Plan Address Payor Plan Phone Number Payor Plan Fax Number Effective Dates    [address] [phone]  12/1/2009 - None Entered    [place] [address]       Subscriber Name Subscriber Birth Date Member ID       JUANITO LORA 1955 [id number]                 Emergency Contacts      (Rel.) Home Phone Work Phone Mobile Phone    Carol Lora (Spouse) [phone] -- [phone]    EdmondAnshu (Son) [phone] -- [phone]               History & Physical      Russ Vera MD at 12/02/19 2003          History and Physical  Russ Vera MD  Hospitalist    Date of admission: 12/2/2019    Patient Care Team:  Preston Dalton MD as PCP - General    Chief complaint   Chief Complaint   Patient presents with   • Shortness of Breath     Subjective     Patient is a 64 y.o. male admitted for worsening dyspnea, cough, " wheezing, markedly reduced effort capacity. He is chronically short of breath given his severe COPD/emphysema and R pneumectomy but his current symptoms are worse than usual.    History  Past Medical History:   Diagnosis Date   • Carotid artery stenosis    • COPD (chronic obstructive pulmonary disease) (CMS/HCC)    • Coronary atherosclerosis    • Hypercholesterolemia    • Hypertension    • Malignant neoplasm of right lung (CMS/HCC)    • JUAN on CPAP    • PVD (peripheral vascular disease) (CMS/HCC)    • Renal artery stenosis (CMS/HCC)    • Sleep apnea    • Squamous cell carcinoma of lung (CMS/HCC)      Past Surgical History:   Procedure Laterality Date   • CARDIAC CATHETERIZATION     • CAROTID ENDARTERECTOMY     • CORONARY ARTERY BYPASS GRAFT     • ENDOSCOPY N/A 8/29/2019   • ESOPHAGOSCOPY / EGD     • HIP ARTHROPLASTY     • LUNG REMOVAL, TOTAL     • GA THROMBOENDARTECTMY NECK,NECK INCIS Left 3/14/2017   • THORACOTOMY     • TRANSESOPHAGEAL ECHOCARDIOGRAM (LEIDA)       Family History   Problem Relation Age of Onset   • Heart disease Mother    • Hypertension Father    • Cancer Sister      Social History     Tobacco Use   • Smoking status: Former Smoker     Years: 35.00   • Smokeless tobacco: Never Used   Substance Use Topics   • Alcohol use: No   • Drug use: No     Medications Prior to Admission   Medication Sig Dispense Refill Last Dose   • albuterol (PROVENTIL HFA;VENTOLIN HFA) 108 (90 BASE) MCG/ACT inhaler Inhale 2 puffs every 4 (four) hours as needed for wheezing.   8/28/2019 at Unknown time   • amLODIPine (NORVASC) 10 MG tablet TAKE ONE TABLET BY MOUTH DAILY 90 tablet 2 8/29/2019 at Unknown time   • aspirin 81 MG EC tablet Take 81 mg by mouth Daily. Instructed to cont taking   8/26/2019   • budesonide-formoterol (SYMBICORT) 80-4.5 MCG/ACT inhaler Inhale 2 puffs.   8/28/2019 at Unknown time   • Cholecalciferol (VITAMIN D3) 5000 units capsule capsule Take 5,000 Units by mouth Every Other Day. Takes Monday Wednesday  Friday 8/28/2019 at Unknown time   • clopidogrel (PLAVIX) 75 MG tablet Take 1 tablet by mouth Daily. 90 tablet 9 8/26/2019   • ferrous sulfate 325 (65 FE) MG tablet One tablet BID 60 tablet 3 8/28/2019 at Unknown time   • furosemide (LASIX) 40 MG tablet TAKE 1/2 TABLET BY MOUTH DAILY 30 tablet 0    • labetalol (NORMODYNE) 200 MG tablet TAKE ONE TABLET BY MOUTH TWICE A DAY 60 tablet 9    • lidocaine (LIDODERM) 5 % Place 1 patch on the skin Daily. Remove & Discard patch within 12 hours or as directed by MD 30 patch 0 Unknown at Unknown time   • pantoprazole (PROTONIX) 40 MG EC tablet    8/28/2019 at Unknown time     Allergies:  Penicillins    Review of Systems  Review of Systems   Constitutional: Positive for fatigue. Negative for fever.   Respiratory: Positive for cough, shortness of breath and wheezing.    Cardiovascular: Negative for chest pain, palpitations and leg swelling.   Gastrointestinal: Negative for abdominal distention, abdominal pain, blood in stool, diarrhea, nausea and vomiting.   Genitourinary: Negative for enuresis, frequency, hematuria, penile swelling and urgency.   Musculoskeletal: Positive for arthralgias. Negative for back pain, gait problem and joint swelling.   Skin: Positive for pallor. Negative for color change and rash.   Neurological: Positive for weakness. Negative for seizures, syncope and headaches.   Psychiatric/Behavioral: Negative for agitation, behavioral problems and confusion.   All other systems reviewed and are negative.      Objective     Vital Signs  Temp:  [97.8 °F (36.6 °C)] 97.8 °F (36.6 °C)  Heart Rate:  [70-78] 74  Resp:  [18-20] 18  BP: ()/(56-68) 137/68    Physical Exam:  Physical Exam   Constitutional: He is oriented to person, place, and time. He appears ill. He appears distressed.   HENT:   Head: Normocephalic and atraumatic.   Eyes: EOM are normal. Pupils are equal, round, and reactive to light. No scleral icterus.   Neck: Normal range of motion. Neck  supple.   Cardiovascular: Normal rate and regular rhythm.   Pulmonary/Chest: No stridor. He is in respiratory distress. He has wheezes.   Decreased air entry R lung   Abdominal: Soft. Bowel sounds are normal. He exhibits no distension and no mass. There is no tenderness. There is no guarding.   Musculoskeletal: Normal range of motion. He exhibits no edema or tenderness.   Neurological: He is alert and oriented to person, place, and time. He displays normal reflexes. No cranial nerve deficit. Coordination normal.   Skin: Skin is warm and dry. No rash noted. No erythema. There is pallor.   Psychiatric: He has a normal mood and affect. His behavior is normal.   Vitals reviewed.      Results Review:   Lab Results (last 24 hours)     Procedure Component Value Units Date/Time    Influenza Antigen, Rapid - Swab, Nasopharynx [[id number]]  (Normal) Collected:  12/02/19 1946    Specimen:  Swab from Nasopharynx Updated:  12/02/19 2009     Influenza A Ag, EIA Negative     Influenza B Ag, EIA Negative    CK [[id number]]  (Normal) Collected:  12/02/19 1753    Specimen:  Blood Updated:  12/02/19 1937     Creatine Kinase 197 U/L     Magnesium [[id number]]  (Normal) Collected:  12/02/19 1753    Specimen:  Blood Updated:  12/02/19 1937     Magnesium 1.9 mg/dL     [place] Draw [[id number]] Collected:  12/02/19 1753    Specimen:  Blood Updated:  12/02/19 1900    Narrative:       The following orders were created for panel order [place] Draw.  Procedure                               Abnormality         Status                     ---------                               -----------         ------                     Light Blue Top[[id number]]                                   Final result               Green Top (Gel)[[id number]]                                  Final result               Lavender Top[[id number]]                                     Final result               Gold Top - SST[[id number]]                                   Final  result                 Please view results for these tests on the individual orders.    Lavender Top [[id number]] Collected:  12/02/19 1753    Specimen:  Blood Updated:  12/02/19 1900     Extra Tube hold for add-on     Comment: Auto resulted       Light Blue Top [[id number]] Collected:  12/02/19 1753    Specimen:  Blood Updated:  12/02/19 1900     Extra Tube hold for add-on     Comment: Auto resulted       Green Top (Gel) [[id number]] Collected:  12/02/19 1753    Specimen:  Blood Updated:  12/02/19 1900     Extra Tube Hold for add-ons.     Comment: Auto resulted.       Gold Top - SST [[id number]] Collected:  12/02/19 1753    Specimen:  Blood Updated:  12/02/19 1900     Extra Tube Hold for add-ons.     Comment: Auto resulted.       Comprehensive Metabolic Panel [[id number]]  (Abnormal) Collected:  12/02/19 1753    Specimen:  Blood Updated:  12/02/19 1821     Glucose 104 mg/dL      BUN 20 mg/dL      Creatinine 0.99 mg/dL      Sodium 133 mmol/L      Potassium 4.7 mmol/L      Chloride 94 mmol/L      CO2 29.0 mmol/L      Calcium 9.3 mg/dL      Total Protein 7.1 g/dL      Albumin 4.20 g/dL      ALT (SGPT) 13 U/L      AST (SGOT) 23 U/L      Alkaline Phosphatase 106 U/L      Total Bilirubin 0.4 mg/dL      eGFR Non African Amer 76 mL/min/1.73      Globulin 2.9 gm/dL      A/G Ratio 1.4 g/dL      BUN/Creatinine Ratio 20.2     Anion Gap 10.0 mmol/L     Narrative:       GFR Normal >60  Chronic Kidney Disease <60  Kidney Failure <15    Troponin [[id number]]  (Normal) Collected:  12/02/19 1753    Specimen:  Blood Updated:  12/02/19 1820     Troponin T <0.010 ng/mL     Narrative:       Troponin T Reference Range:  <= 0.03 ng/mL-   Negative for AMI  >0.03 ng/mL-     Abnormal for myocardial necrosis.  Clinicians would have to utilize clinical acumen, EKG, Troponin and serial changes to determine if it is an Acute Myocardial Infarction or myocardial injury due to an underlying chronic condition.     BNP [[id number]]  (Abnormal) Collected:   12/02/19 1753    Specimen:  Blood Updated:  12/02/19 1819     proBNP 3,321.0 pg/mL     Narrative:       Among patients with dyspnea, NT-proBNP is highly sensitive for the detection of acute congestive heart failure. In addition NT-proBNP of <300 pg/ml effectively rules out acute congestive heart failure with 99% negative predictive value.    CBC & Differential [[id number]] Collected:  12/02/19 1753    Specimen:  Blood Updated:  12/02/19 1801    Narrative:       The following orders were created for panel order CBC & Differential.  Procedure                               Abnormality         Status                     ---------                               -----------         ------                     CBC Auto Differential[[id number]]        Abnormal            Final result                 Please view results for these tests on the individual orders.    CBC Auto Differential [[id number]]  (Abnormal) Collected:  12/02/19 1753    Specimen:  Blood Updated:  12/02/19 1801     WBC 9.86 10*3/mm3      RBC 4.96 10*6/mm3      Hemoglobin 13.9 g/dL      Hematocrit 42.5 %      MCV 85.7 fL      MCH 28.0 pg      MCHC 32.7 g/dL      RDW 14.1 %      RDW-SD 43.8 fl      MPV 12.1 fL      Platelets 211 10*3/mm3      Neutrophil % 81.5 %      Lymphocyte % 12.0 %      Monocyte % 5.8 %      Eosinophil % 0.1 %      Basophil % 0.2 %      Immature Grans % 0.4 %      Neutrophils, Absolute 8.04 10*3/mm3      Lymphocytes, Absolute 1.18 10*3/mm3      Monocytes, Absolute 0.57 10*3/mm3      Eosinophils, Absolute 0.01 10*3/mm3      Basophils, Absolute 0.02 10*3/mm3      Immature Grans, Absolute 0.04 10*3/mm3      nRBC 0.0 /100 WBC           Imaging Results (Last 24 Hours)     Procedure Component Value Units Date/Time    XR Chest 1 View [[id number]] Collected:  12/02/19 1723     Updated:  12/02/19 1748    Narrative:       PROCEDURE: XR CHEST 1 VW    VIEWS:Single    INDICATION: Shortness of breath    COMPARISON: CXR: 1/15/2016    FINDINGS:        -Postoperative changes typical of right pneumonectomy with  rightward shift of the heart and mediastinal contents is present,  as before. There is hyperinflation of the left lung, extending  across midline. Old right-sided rib fractures appear similar to  prior study      Impression:       Stable postoperative changes status post right pneumonectomy,  without acute abnormality identified      Electronically signed by:  Alecia Valenzuela MD  12/2/2019 5:47 PM CST  Workstation: [id number]          Assessment/Plan       Acute on chronic respiratory failure with hypoxia (CMS/HCC)    COPD exacerbation (CMS/HCC)    We'll continue to provide him with the supplemental Oxygen, nebulized treatments, IV steroids, supportive care. We'll obtain a cardiac Echo in AM.    Russ Vera MD  12/02/19  9:22 PM      Electronically signed by Russ Vera MD at 12/02/19 2133          Emergency Department Notes      Halie Lazo RN at 12/02/19 1625        Pt presents to the ED with c/o difficulty breathing for the last 4 weeks. Pt was seen at an urgent care and was given antibiotics and steroid with little to no relief. Pt reports he has a productive cough with green sputum. Pt has a hx of COPD and right lung has been removed due to lung cancer. Pt denies chest pain but has an extensive cardiac hx.      Halie Lazo RN  12/02/19 1629      Electronically signed by Halie Lazo RN at 12/02/19 0302     Emeterio Washington MD at 12/02/19 1919          Subjective   Patient states that over the past several days his shortness of breath has been progressing.  He is at the point where he becomes hypoxic after taking several steps.  He does use CPAP at home, but does not do home oxygen.  He has a history of lung cancer with a right pneumonectomy as well as coronary artery bypass graft.        Shortness of Breath   Severity:  Moderate  Onset quality:  Gradual  Duration:  3 days  Timing:  Constant  Progression:  Worsening  Chronicity:   New  Context: activity    Relieved by:  Oxygen and lying down  Worsened by:  Activity  Ineffective treatments:  None tried  Associated symptoms: cough and wheezing    Associated symptoms: no abdominal pain, no chest pain, no diaphoresis, no ear pain, no fever, no headaches, no neck pain, no rash, no sore throat and no vomiting    Risk factors: no obesity and no tobacco use (fromer smoker)        Review of Systems   Constitutional: Positive for activity change and fatigue. Negative for appetite change, chills, diaphoresis and fever.   HENT: Positive for congestion. Negative for ear discharge, ear pain, nosebleeds, rhinorrhea, sinus pressure, sore throat and trouble swallowing.    Eyes: Negative for discharge and redness.   Respiratory: Positive for cough, shortness of breath and wheezing. Negative for apnea and chest tightness.    Cardiovascular: Negative for chest pain.   Gastrointestinal: Negative for abdominal pain, diarrhea, nausea and vomiting.   Endocrine: Negative for polyuria.   Genitourinary: Negative for dysuria, frequency and urgency.   Musculoskeletal: Negative for myalgias and neck pain.   Skin: Negative for color change and rash.   Allergic/Immunologic: Negative for immunocompromised state.   Neurological: Positive for dizziness and weakness. Negative for seizures, syncope, light-headedness and headaches.   Hematological: Negative for adenopathy. Does not bruise/bleed easily.   Psychiatric/Behavioral: Negative for behavioral problems and confusion.   All other systems reviewed and are negative.      Past Medical History:   Diagnosis Date   • Allergic rhinitis    • Atherosclerosis of native arteries of the extremities with ulceration (CMS/HCC)     Bilateral legs   • Benign hypertension    • Carotid artery stenosis    • Coagulopathy (CMS/HCC)     on plavix and asa (instructed to continue per md)   • Coronary atherosclerosis    • Essential hypertension    • Hypercholesterolemia    • Inguinal pain     small  right groin seroma   • Malignant neoplasm of lung (CMS/HCC)     Right upper lobe mass suspicious for lung cancer   • JUAN on CPAP    • PVD (peripheral vascular disease) (CMS/HCC)    • Renal artery stenosis (CMS/HCC)    • Simple chronic bronchitis (CMS/HCC)    • Sleep apnea    • SOB (shortness of breath)    • Squamous cell carcinoma of lung (CMS/HCC)        Allergies   Allergen Reactions   • Penicillins Itching     Rash,itching       Past Surgical History:   Procedure Laterality Date   • CARDIAC CATHETERIZATION  07/12/2011    Multi-vessel coronary artery disease with critical lesion noted in the LAD coronary artery, left circumflex coronary artery and right coronary artery as described above. The LAD appeared to be chronically occluded    • CAROTID ENDARTERECTOMY  12/03/2013    Right carotid endarterectomy. Carotid cerebral arteriogram   • CORONARY ARTERY BYPASS GRAFT  07/14/2011    LIMA->LAD SVG->OM2 SVG->OM3 SVG->steve branch   • ENDOSCOPY N/A 8/29/2019    Procedure: ESOPHAGOGASTRODUODENOSCOPY;  Surgeon: Minesh Moya DO;  Location: [facility] ENDOSCOPY;  Service: Gastroenterology   • ESOPHAGOSCOPY / EGD  12/06/2011    With tube-Normal hypopharynx, GE junction, Duodenum, symmetrical & patent pylorus. Tongue of columnar epithelium that could be a Ivy's esophagus present. Multiple biopsies performed. Chronic gastritis in antrum. Biopsy taken   • HIP SURGERY  01/18/2016    Right total hip arthroplasty anterior approach.   • JOINT REPLACEMENT Right     hip; more than 2 years ago   • LUNG REMOVAL, TOTAL  07/13/2016    Right thoracotomy with Carinal Pneumonectomy, Thoracic & Mediastinal Lymphadenetomy, Fiberoptic Bronchoscopy   • CA THROMBOENDARTECTMY NECK,NECK INCIS Left 3/14/2017    Procedure: CAROTID ENDARTERECTOMY, ARTERIOGRAM ;  Surgeon: Brayden Ray MD;  Location: [facility] OR;  Service: Vascular   • THORACOTOMY  07/13/2016    Right thoracotomy with carinal pneumonectomy.Thoracic and mediastinal  lymphadenectomy.Fiberoptic bronchoscopy   • TRANSESOPHAGEAL ECHOCARDIOGRAM (LEIDA)  07/12/2011    Without color flow-LV dysfunction with an EF of 40-45% with hypokinesis/akinesis of the distal septum and apex. Mild left atrial enlargement. No significant valvular abnormalities noted. No pericardial effusion noted       Family History   Problem Relation Age of Onset   • Heart disease Mother    • Other Mother         ischemic heart disease   • Hypertension Mother    • Heart disease Father    • Other Father         ischemic heart disease   • Hypertension Father    • Coronary artery disease Other    • Cancer Sister        Social History     Socioeconomic History   • Marital status:      Spouse name: Not on file   • Number of children: Not on file   • Years of education: Not on file   • Highest education level: Not on file   Tobacco Use   • Smoking status: Former Smoker     Years: 35.00   • Smokeless tobacco: Never Used   Substance and Sexual Activity   • Alcohol use: No   • Drug use: No   • Sexual activity: Defer           Objective   Physical Exam   Constitutional: He is oriented to person, place, and time. He appears well-developed and well-nourished.   HENT:   Head: Normocephalic and atraumatic.   Nose: Nose normal.   Mouth/Throat: Oropharynx is clear and moist.   Eyes: Conjunctivae and EOM are normal. Pupils are equal, round, and reactive to light. Right eye exhibits no discharge. Left eye exhibits no discharge. No scleral icterus.   Neck: Normal range of motion. Neck supple. No tracheal deviation present.   Cardiovascular: Normal rate, regular rhythm and normal heart sounds.   No murmur heard.  Pulmonary/Chest: Accessory muscle usage present. No stridor. No respiratory distress. He has decreased breath sounds. He has wheezes. He has rhonchi. He has no rales.   Abdominal: Soft. Bowel sounds are normal. He exhibits no distension and no mass. There is no tenderness. There is no rebound and no guarding.    Musculoskeletal: He exhibits no edema.   Neurological: He is alert and oriented to person, place, and time. Coordination normal.   Skin: Skin is warm and dry. No rash noted. No erythema.   Psychiatric: He has a normal mood and affect. His behavior is normal. Thought content normal.   Nursing note and vitals reviewed.      Procedures          ED Course          Labs Reviewed   COMPREHENSIVE METABOLIC PANEL - Abnormal; Notable for the following components:       Result Value    Glucose 104 (*)     Sodium 133 (*)     Chloride 94 (*)     All other components within normal limits    Narrative:     GFR Normal >60  Chronic Kidney Disease <60  Kidney Failure <15   BNP (IN-HOUSE) - Abnormal; Notable for the following components:    proBNP 3,321.0 (*)     All other components within normal limits    Narrative:     Among patients with dyspnea, NT-proBNP is highly sensitive for the detection of acute congestive heart failure. In addition NT-proBNP of <300 pg/ml effectively rules out acute congestive heart failure with 99% negative predictive value.   CBC WITH AUTO DIFFERENTIAL - Abnormal; Notable for the following components:    MPV 12.1 (*)     Neutrophil % 81.5 (*)     Lymphocyte % 12.0 (*)     Eosinophil % 0.1 (*)     Neutrophils, Absolute 8.04 (*)     All other components within normal limits   TROPONIN (IN-HOUSE) - Normal    Narrative:     Troponin T Reference Range:  <= 0.03 ng/mL-   Negative for AMI  >0.03 ng/mL-     Abnormal for myocardial necrosis.  Clinicians would have to utilize clinical acumen, EKG, Troponin and serial changes to determine if it is an Acute Myocardial Infarction or myocardial injury due to an underlying chronic condition.    INFLUENZA ANTIGEN, RAPID   RAINBOW DRAW    Narrative:     The following orders were created for panel order [place] Draw.  Procedure                               Abnormality         Status                     ---------                               -----------         ------                      Light Blue Top[[id number]]                                   Final result               Green Top (Gel)[[id number]]                                  Final result               Lavender Top[[id number]]                                     Final result               Gold Top - SST[[id number]]                                   Final result                 Please view results for these tests on the individual orders.   CK   MAGNESIUM   CBC AND DIFFERENTIAL    Narrative:     The following orders were created for panel order CBC & Differential.  Procedure                               Abnormality         Status                     ---------                               -----------         ------                     CBC Auto Differential[[id number]]        Abnormal            Final result                 Please view results for these tests on the individual orders.   LIGHT BLUE TOP   GREEN TOP   LAVENDER TOP   GOLD TOP - SST       XR Chest 1 View   Final Result   Stable postoperative changes status post right pneumonectomy,   without acute abnormality identified         Electronically signed by:  Alecia Valenzuela MD  12/2/2019 5:47 PM [facility]   Workstation: [id number]                    [facility]    Final diagnoses:   COPD exacerbation (CMS/HCC)   Acute on chronic congestive heart failure, unspecified heart failure type (CMS/HCC)   Hypotension, unspecified hypotension type              Emeterio Washington MD  12/02/19 1935      Electronically signed by Emeterio Washington MD at 12/02/19 [address] [place] [address] Medications (all)       Dose Frequency Start End    acetaminophen (TYLENOL) tablet 650 mg 650 mg Every 4 Hours PRN 12/3/2019     Sig - Route: Take 2 tablets by mouth Every 4 (Four) Hours As Needed for Mild Pain . - Oral    budesonide-formoterol (SYMBICORT) 80-4.5 MCG/ACT inhaler 2 puff 2 puff 2 Times Daily - RT 12/2/2019     Sig - Route: Inhale 2 puffs 2 (Two) Times a Day. - Inhalation    clopidogrel (PLAVIX) tablet  75 mg 75 mg Daily 12/3/2019     Sig - Route: Take 1 tablet by mouth Daily. - Oral    ferrous sulfate EC tablet 324 mg 324 mg 2 Times Daily With Meals 12/2/2019     Sig - Route: Take 1 tablet by mouth 2 (Two) Times a Day With Meals. - Oral    furosemide (LASIX) tablet 20 mg 20 mg Daily 12/3/2019     Sig - Route: Take 1 tablet by mouth Daily. - Oral    heparin (porcine) 5000 UNIT/ML injection 5,000 Units 5,000 Units Every 8 Hours Scheduled 12/3/2019     Sig - Route: Inject 1 mL under the skin into the appropriate area as directed Every 8 (Eight) Hours. - Subcutaneous    ipratropium-albuterol (DUO-NEB) nebulizer solution 3 mL 3 mL Once 12/2/2019 12/2/2019    Sig - Route: Take 3 mL by nebulization 1 (One) Time. - Nebulization    ipratropium-albuterol (DUO-NEB) nebulizer solution 3 mL 3 mL 4 Times Daily - RT 12/3/2019     Sig - Route: Take 3 mL by nebulization 4 (Four) Times a Day. - Nebulization    labetalol (NORMODYNE) tablet 200 mg 200 mg Every 12 Hours Scheduled 12/3/2019     Sig - Route: Take 1 tablet by mouth Every 12 (Twelve) Hours. - Oral    methylPREDNISolone sodium succinate (SOLU-Medrol) injection 125 mg 125 mg Once 12/2/2019 12/2/2019    Sig - Route: Infuse 2 mL into a venous catheter 1 (One) Time. - Intravenous    methylPREDNISolone sodium succinate (SOLU-Medrol) injection 60 mg 60 mg Every 8 Hours 12/3/2019     Sig - Route: Infuse 0.96 mL into a venous catheter Every 8 (Eight) Hours. - Intravenous    pantoprazole (PROTONIX) EC tablet 40 mg 40 mg Nightly 12/3/2019     Sig - Route: Take 1 tablet by mouth Every Night. - Oral    sodium chloride 0.9 % flush 10 mL 10 mL As Needed 12/2/2019     Sig - Route: Infuse 10 mL into a venous catheter As Needed for Line Care. - Intravenous    Cosign for Ordering: Accepted by Emeterio Washington MD on 12/2/2019  7:08 PM    sodium chloride 0.9 % flush 10 mL 10 mL Every 12 Hours Scheduled 12/2/2019     Sig - Route: Infuse 10 mL into a venous catheter Every 12 (Twelve)  Hours. - Intravenous    sodium chloride 0.9 % flush 10 mL 10 mL As Needed 12/2/2019     Sig - Route: Infuse 10 mL into a venous catheter As Needed for Line Care. - Intravenous          Lab Results (last 48 hours)     Procedure Component Value Units Date/Time    Influenza Antigen, Rapid - Swab, Nasopharynx [[id number]]  (Normal) Collected:  12/02/19 1946    Specimen:  Swab from Nasopharynx Updated:  12/02/19 2009     Influenza A Ag, EIA Negative     Influenza B Ag, EIA Negative    CK [[id number]]  (Normal) Collected:  12/02/19 1753    Specimen:  Blood Updated:  12/02/19 1937     Creatine Kinase 197 U/L     Magnesium [[id number]]  (Normal) Collected:  12/02/19 1753    Specimen:  Blood Updated:  12/02/19 1937     Magnesium 1.9 mg/dL     [place] Draw [[id number]] Collected:  12/02/19 1753    Specimen:  Blood Updated:  12/02/19 1900    Narrative:       The following orders were created for panel order [place] [facility].  Procedure                               Abnormality         Status                     ---------                               -----------         ------                     Light Blue Top[[id number]]                                   Final result               Green Top (Gel)[[id number]]                                  Final result               Lavender Top[[id number]]                                     Final result               Gold Top - SST[[id number]]                                   Final result                 Please view results for these tests on the individual orders.    Lavender Top [[id number]] Collected:  12/02/19 1753    Specimen:  Blood Updated:  12/02/19 1900     Extra Tube hold for add-on     Comment: Auto resulted       Light Blue Top [[id number]] Collected:  12/02/19 1753    Specimen:  Blood Updated:  12/02/19 1900     Extra Tube hold for add-on     Comment: Auto resulted       Green Top (Gel) [[id number]] Collected:  12/02/19 1753    Specimen:  Blood Updated:  12/02/19 1900     Extra Tube Hold  for add-ons.     Comment: Auto resulted.       Gold Top - SST [[id number]] Collected:  12/02/19 1753    Specimen:  Blood Updated:  12/02/19 1900     Extra Tube Hold for add-ons.     Comment: Auto resulted.       Comprehensive Metabolic Panel [[id number]]  (Abnormal) Collected:  12/02/19 1753    Specimen:  Blood Updated:  12/02/19 1821     Glucose 104 mg/dL      BUN 20 mg/dL      Creatinine 0.99 mg/dL      Sodium 133 mmol/L      Potassium 4.7 mmol/L      Chloride 94 mmol/L      CO2 29.0 mmol/L      Calcium 9.3 mg/dL      Total Protein 7.1 g/dL      Albumin 4.20 g/dL      ALT (SGPT) 13 U/L      AST (SGOT) 23 U/L      Alkaline Phosphatase 106 U/L      Total Bilirubin 0.4 mg/dL      eGFR Non African Amer 76 mL/min/1.73      Globulin 2.9 gm/dL      A/G Ratio 1.4 g/dL      BUN/Creatinine Ratio 20.2     Anion Gap 10.0 mmol/L     Narrative:       GFR Normal >60  Chronic Kidney Disease <60  Kidney Failure <15    Troponin [[id number]]  (Normal) Collected:  12/02/19 1753    Specimen:  Blood Updated:  12/02/19 1820     Troponin T <0.010 ng/mL     Narrative:       Troponin T Reference Range:  <= 0.03 ng/mL-   Negative for AMI  >0.03 ng/mL-     Abnormal for myocardial necrosis.  Clinicians would have to utilize clinical acumen, EKG, Troponin and serial changes to determine if it is an Acute Myocardial Infarction or myocardial injury due to an underlying chronic condition.     BNP [[id number]]  (Abnormal) Collected:  12/02/19 1753    Specimen:  Blood Updated:  12/02/19 1819     proBNP 3,321.0 pg/mL     Narrative:       Among patients with dyspnea, NT-proBNP is highly sensitive for the detection of acute congestive heart failure. In addition NT-proBNP of <300 pg/ml effectively rules out acute congestive heart failure with 99% negative predictive value.    CBC & Differential [[id number]] Collected:  12/02/19 1753    Specimen:  Blood Updated:  12/02/19 1801    Narrative:       The following orders were created for panel order CBC &  Differential.  Procedure                               Abnormality         Status                     ---------                               -----------         ------                     CBC Auto Differential[[id number]]        Abnormal            Final result                 Please view results for these tests on the individual orders.    CBC Auto Differential [[id number]]  (Abnormal) Collected:  12/02/19 1753    Specimen:  Blood Updated:  12/02/19 1801     WBC 9.86 10*3/mm3      RBC 4.96 10*6/mm3      Hemoglobin 13.9 g/dL      Hematocrit 42.5 %      MCV 85.7 fL      MCH 28.0 pg      MCHC 32.7 g/dL      RDW 14.1 %      RDW-SD 43.8 fl      MPV 12.1 fL      Platelets 211 10*3/mm3      Neutrophil % 81.5 %      Lymphocyte % 12.0 %      Monocyte % 5.8 %      Eosinophil % 0.1 %      Basophil % 0.2 %      Immature Grans % 0.4 %      Neutrophils, Absolute 8.04 10*3/mm3      Lymphocytes, Absolute 1.18 10*3/mm3      Monocytes, Absolute 0.57 10*3/mm3      Eosinophils, Absolute 0.01 10*3/mm3      Basophils, Absolute 0.02 10*3/mm3      Immature Grans, Absolute 0.04 10*3/mm3      nRBC 0.0 /100 WBC           Imaging Results (Last 48 Hours)     Procedure Component Value Units Date/Time    XR Chest 1 View [[id number]] Collected:  12/02/19 1723     Updated:  12/02/19 1748    Narrative:       PROCEDURE: XR CHEST 1 VW    VIEWS:Single    INDICATION: Shortness of breath    COMPARISON: CXR: 1/15/2016    FINDINGS:       -Postoperative changes typical of right pneumonectomy with  rightward shift of the heart and mediastinal contents is present,  as before. There is hyperinflation of the left lung, extending  across midline. Old right-sided rib fractures appear similar to  prior study      Impression:       Stable postoperative changes status post right pneumonectomy,  without acute abnormality identified      Electronically signed by:  Alecia Valenzuela MD  12/2/2019 5:47 PM CST  Workstation: [id number]        ECG/EMG Results (last 48 hours)      Procedure Component Value Units Date/Time    ECG 12 Lead [[id number]] Collected:  12/02/19 1633     Updated:  12/02/19 2131    Narrative:       Test Reason : SOA  Blood Pressure : **/** mmHG  Vent. Rate : 073 BPM     Atrial Rate : 073 BPM     P-R Int : 142 ms          QRS Dur : 130 ms      QT Int : 448 ms       P-R-T Axes : 063 232 049 degrees     QTc Int : 493 ms    Normal sinus rhythm  Right atrial enlargement  Right bundle branch block  Inferior infarct (cited on or before 08-JUL-2016)  Anterolateral infarct (cited on or before 27-NOV-2013)  Abnormal ECG  When compared with ECG of 30-JUL-2018 15:06,  Questionable change in initial forces of Septal leads  Confirmed by AIDE NAVA, B. N. ([id number]) on 12/2/2019 9:31:22 PM    Referred By:             Confirmed By:CORINNA NOBLE MD    SCANNED EKG [[id number]] Resulted:  12/02/19      Updated:  12/03/19 1347    Adult Transthoracic Echo Limited W/ Cont if Necessary Per Protocol [[id number]] Resulted:  12/03/19 1659     Updated:  12/03/19 1704     BSA 1.9 m^2      MR max keysha 404.0 cm/sec      MR max PG 65.3 mmHg      BH CV ECHO JUNG - BZI_BMI 23.0 kilograms/m^2      BH CV ECHO JUNG - BSA(HAYCOCK) 1.9 m^2      BH CV ECHO JUNG - BZI_METRIC_WEIGHT 72.6 kg      BH CV ECHO JUNG - BZI_METRIC_HEIGHT 177.8 cm      Target HR (85%) 131 bpm      Max. Pred. HR (100%) 154 bpm      Echo EF Estimated 55 %     Narrative:       · The study is technically difficult for diagnosis. The quality of the   study is limited due to poor acoustic windows related to limited views   obtained and lung disease.  · Left ventricular wall thickness is consistent with mild concentric   hypertrophy.  · Estimated EF = 55%.  · Left ventricular systolic function is normal.  · Left ventricular diastolic dysfunction (grade I a) consistent with   impaired relaxation.  · Left atrial cavity size is borderline dilated.              Physician Progress Notes (last 48 hours) (Notes from 12/02/19 1510 through 12/04/19  1510)      Jorge Garay MD at 12/04/19 1312              [facility] Medicine Services  INPATIENT PROGRESS NOTE    Length of Stay: 0  Date of Admission: 12/2/2019  Primary Care Physician: Preston Dalton MD    Subjective   Chief Complaint: Shortness of breath  HPI: Patient states that he is doing okay this morning.  He was feeling some better yesterday, but got significantly more short of breath overnight.    Review of Systems   Constitutional: Positive for activity change and fatigue. Negative for appetite change, chills and fever.   Respiratory: Positive for cough, shortness of breath and wheezing. Negative for chest tightness.    Cardiovascular: Negative for chest pain, palpitations and leg swelling.   Gastrointestinal: Negative for abdominal pain, constipation, diarrhea, nausea and vomiting.   Skin: Negative for wound.   Neurological: Negative for dizziness, weakness, light-headedness, numbness and headaches.        All pertinent negatives and positives are as above. All other systems have been reviewed and are negative unless otherwise stated.     Objective    Temp:  [97.2 °F (36.2 °C)-98.2 °F (36.8 °C)] 98.2 °F (36.8 °C)  Heart Rate:  [] 90  Resp:  [16-18] 18  BP: (106-149)/(63-76) 106/63    Physical Exam   Constitutional: He appears well-developed and well-nourished.   HENT:   Head: Normocephalic and atraumatic.   Eyes: EOM are normal. Pupils are equal, round, and reactive to light.   Neck: Normal range of motion. Neck supple.   Cardiovascular: Normal rate, regular rhythm, normal heart sounds and intact distal pulses. Exam reveals no gallop and no friction rub.   No murmur heard.  Pulmonary/Chest: No accessory muscle usage. No respiratory distress. He has decreased breath sounds. He has wheezes. He has no rales. He exhibits no tenderness.   Abdominal: Soft. Bowel sounds are normal. He exhibits no distension. There is no tenderness.   Psychiatric: He  has a normal mood and affect. His behavior is normal.   Vitals reviewed.      Results Review:  I have reviewed the labs, radiology results, and diagnostic studies.    Laboratory Data:   Results from last 7 days   Lab Units 12/02/19  1753   SODIUM mmol/L 133*   POTASSIUM mmol/L 4.7   CHLORIDE mmol/L 94*   CO2 mmol/L 29.0   BUN mg/dL 20   CREATININE mg/dL 0.99   GLUCOSE mg/dL 104*   CALCIUM mg/dL 9.3   BILIRUBIN mg/dL 0.4   ALK PHOS U/L 106   ALT (SGPT) U/L 13   AST (SGOT) U/L 23   ANION GAP mmol/L 10.0     Estimated Creatinine Clearance: 68.6 mL/min (by C-G formula based on SCr of 0.99 mg/dL).  Results from last 7 days   Lab Units 12/02/19  1753   MAGNESIUM mg/dL 1.9         Results from last 7 days   Lab Units 12/02/19  1753   WBC 10*3/mm3 9.86   HEMOGLOBIN g/dL 13.9   HEMATOCRIT % 42.5   PLATELETS 10*3/mm3 211           Culture Data:   No results found for: BLOODCX  No results found for: URINECX  No results found for: RESPCX  No results found for: WOUNDCX  No results found for: STOOLCX  No components found for: BODYFLD    Radiology Data:   Imaging Results (Last 24 Hours)     ** No results found for the last 24 hours. **          I have reviewed the patient's current medications.     Assessment/Plan     Active Hospital Problems    Diagnosis   • **Acute on chronic respiratory failure with hypoxia (CMS/[facility])   • COPD exacerbation (CMS/[facility])       Plan:    1.  Acute on chronic respiratory failure with hypoxia: Patient is continuing to require supplemental oxygen.  He does not feel significantly better than on admission.  Echo with normal ejection fraction, mild concentric hypertrophy, and grade 1 diastolic dysfunction.  2.  COPD exacerbation: Continue steroids, nebulizers, supplemental oxygen.  3.  Status post right pneumonectomy.  4.  Obstructive sleep apnea: Continue home CPAP.  5.  Coronary artery disease: Continue home medication.  6.  Peripheral vascular disease  7.  DVT prophylaxis: Heparin.      Discharge  Planning: I expect patient to be discharged to home in 2-3 days.        This document has been electronically signed by Jorge Garay MD on December 4, 2019 1:12 PM        Electronically signed by Jorge Garay MD at 12/04/19 8908     Jorge Garay MD at 12/03/19 2047              [facility] Medicine Services  INPATIENT PROGRESS NOTE    Length of Stay: 0  Date of Admission: 12/2/2019  Primary Care Physician: Preston Dalton MD    Subjective   Chief Complaint: Shortness of breath  HPI: Patient complains of significant shortness of breath and wheezing.  He states that he is not significantly better from admission.    Review of Systems   Constitutional: Positive for activity change and fatigue. Negative for appetite change, chills and fever.   Respiratory: Positive for cough, shortness of breath and wheezing. Negative for chest tightness.    Cardiovascular: Negative for chest pain, palpitations and leg swelling.   Gastrointestinal: Negative for abdominal pain, constipation, diarrhea, nausea and vomiting.   Skin: Negative for wound.   Neurological: Negative for dizziness, weakness, light-headedness, numbness and headaches.        All pertinent negatives and positives are as above. All other systems have been reviewed and are negative unless otherwise stated.     Objective    Temp:  [97.5 °F (36.4 °C)-98.2 °F (36.8 °C)] 97.7 °F (36.5 °C)  Heart Rate:  [] 138  Resp:  [18-20] 18  BP: ()/(56-76) 158/62    Physical Exam   Constitutional: He appears well-developed and well-nourished.   HENT:   Head: Normocephalic and atraumatic.   Eyes: EOM are normal. Pupils are equal, round, and reactive to light.   Neck: Normal range of motion. Neck supple.   Cardiovascular: Normal rate, regular rhythm, normal heart sounds and intact distal pulses. Exam reveals no gallop and no friction rub.   No murmur heard.  Pulmonary/Chest: Accessory muscle usage present. No  respiratory distress. He has decreased breath sounds. He has wheezes. He has no rales. He exhibits no tenderness.   Abdominal: Soft. Bowel sounds are normal. He exhibits no distension. There is no tenderness.   Psychiatric: He has a normal mood and affect. His behavior is normal.   Vitals reviewed.          Results Review:  I have reviewed the labs, radiology results, and diagnostic studies.    Laboratory Data:   Results from last 7 days   Lab Units 12/02/19  1753   SODIUM mmol/L 133*   POTASSIUM mmol/L 4.7   CHLORIDE mmol/L 94*   CO2 mmol/L 29.0   BUN mg/dL 20   CREATININE mg/dL 0.99   GLUCOSE mg/dL 104*   CALCIUM mg/dL 9.3   BILIRUBIN mg/dL 0.4   ALK PHOS U/L 106   ALT (SGPT) U/L 13   AST (SGOT) U/L 23   ANION GAP mmol/L 10.0     Estimated Creatinine Clearance: 70.1 mL/min (by C-G formula based on SCr of 0.99 mg/dL).  Results from last 7 days   Lab Units 12/02/19  1753   MAGNESIUM mg/dL 1.9         Results from last 7 days   Lab Units 12/02/19  1753   WBC 10*3/mm3 9.86   HEMOGLOBIN g/dL 13.9   HEMATOCRIT % 42.5   PLATELETS 10*3/mm3 211           Culture Data:   No results found for: BLOODCX  No results found for: URINECX  No results found for: RESPCX  No results found for: WOUNDCX  No results found for: STOOLCX  No components found for: BODYFLD    Radiology Data:   Imaging Results (Last 24 Hours)     Procedure Component Value Units Date/Time    XR Chest 1 View [[id number]] Collected:  12/02/19 1723     Updated:  12/02/19 1748    Narrative:       PROCEDURE: XR CHEST 1 VW    VIEWS:Single    INDICATION: Shortness of breath    COMPARISON: CXR: 1/15/2016    FINDINGS:       -Postoperative changes typical of right pneumonectomy with  rightward shift of the heart and mediastinal contents is present,  as before. There is hyperinflation of the left lung, extending  across midline. Old right-sided rib fractures appear similar to  prior study      Impression:       Stable postoperative changes status post right  pneumonectomy,  without acute abnormality identified      Electronically signed by:  Alecia Valenzuela MD  12/2/2019 5:47 PM CST  Workstation: [id number]          I have reviewed the patient's current medications.     Assessment/Plan     Active Hospital Problems    Diagnosis   • **Acute on chronic respiratory failure with hypoxia (CMS/[facility])   • COPD exacerbation (CMS/[facility])       Plan:    1.  Acute on chronic respiratory failure with hypoxia: Patient is continuing to require supplemental oxygen.  He does not feel significantly better than on admission.  Echo pending  2.  COPD exacerbation: Continue steroids, nebulizers, supplemental oxygen.  3.  Status post right pneumonectomy.  4.  Obstructive sleep apnea: Continue home CPAP.  5.  Coronary artery disease: Continue home medication.  6.  Peripheral vascular disease  7.  DVT prophylaxis: Heparin.          Discharge Planning: I expect patient to be discharged to home in 2-3 days.        This document has been electronically signed by Jorge Garay MD on December 3, 2019 1:57 PM        Electronically signed by Jorge Garay MD at 12/03/19 1403       Consult Notes (last 48 hours) (Notes from 12/02/19 1510 through 12/04/19 1510)     No notes of this type exist for this encounter.

## 2019-12-04 NOTE — PROGRESS NOTES
[facility] Medicine Services  INPATIENT PROGRESS NOTE    Length of Stay: 0  Date of Admission: 12/2/2019  Primary Care Physician: Preston Dalton MD    Subjective   Chief Complaint: Shortness of breath  HPI: Patient states that he is doing okay this morning.  He was feeling some better yesterday, but got significantly more short of breath overnight.    Review of Systems   Constitutional: Positive for activity change and fatigue. Negative for appetite change, chills and fever.   Respiratory: Positive for cough, shortness of breath and wheezing. Negative for chest tightness.    Cardiovascular: Negative for chest pain, palpitations and leg swelling.   Gastrointestinal: Negative for abdominal pain, constipation, diarrhea, nausea and vomiting.   Skin: Negative for wound.   Neurological: Negative for dizziness, weakness, light-headedness, numbness and headaches.        All pertinent negatives and positives are as above. All other systems have been reviewed and are negative unless otherwise stated.     Objective    Temp:  [97.2 °F (36.2 °C)-98.2 °F (36.8 °C)] 98.2 °F (36.8 °C)  Heart Rate:  [] 90  Resp:  [16-18] 18  BP: (106-149)/(63-76) 106/63    Physical Exam   Constitutional: He appears well-developed and well-nourished.   HENT:   Head: Normocephalic and atraumatic.   Eyes: EOM are normal. Pupils are equal, round, and reactive to light.   Neck: Normal range of motion. Neck supple.   Cardiovascular: Normal rate, regular rhythm, normal heart sounds and intact distal pulses. Exam reveals no gallop and no friction rub.   No murmur heard.  Pulmonary/Chest: No accessory muscle usage. No respiratory distress. He has decreased breath sounds. He has wheezes. He has no rales. He exhibits no tenderness.   Abdominal: Soft. Bowel sounds are normal. He exhibits no distension. There is no tenderness.   Psychiatric: He has a normal mood and affect. His behavior is normal.    Vitals reviewed.      Results Review:  I have reviewed the labs, radiology results, and diagnostic studies.    Laboratory Data:   Results from last 7 days   Lab Units 12/02/19  1753   SODIUM mmol/L 133*   POTASSIUM mmol/L 4.7   CHLORIDE mmol/L 94*   CO2 mmol/L 29.0   BUN mg/dL 20   CREATININE mg/dL 0.99   GLUCOSE mg/dL 104*   CALCIUM mg/dL 9.3   BILIRUBIN mg/dL 0.4   ALK PHOS U/L 106   ALT (SGPT) U/L 13   AST (SGOT) U/L 23   ANION GAP mmol/L 10.0     Estimated Creatinine Clearance: 68.6 mL/min (by C-G formula based on SCr of 0.99 mg/dL).  Results from last 7 days   Lab Units 12/02/19  1753   MAGNESIUM mg/dL 1.9         Results from last 7 days   Lab Units 12/02/19  1753   WBC 10*3/mm3 9.86   HEMOGLOBIN g/dL 13.9   HEMATOCRIT % 42.5   PLATELETS 10*3/mm3 211           Culture Data:   No results found for: BLOODCX  No results found for: URINECX  No results found for: RESPCX  No results found for: WOUNDCX  No results found for: STOOLCX  No components found for: BODYFLD    Radiology Data:   Imaging Results (Last 24 Hours)     ** No results found for the last 24 hours. **          I have reviewed the patient's current medications.     Assessment/Plan     Active Hospital Problems    Diagnosis   • **Acute on chronic respiratory failure with hypoxia (CMS/[facility])   • COPD exacerbation (CMS/[facility])       Plan:    1.  Acute on chronic respiratory failure with hypoxia: Patient is continuing to require supplemental oxygen.  He does not feel significantly better than on admission.  Echo with normal ejection fraction, mild concentric hypertrophy, and grade 1 diastolic dysfunction.  2.  COPD exacerbation: Continue steroids, nebulizers, supplemental oxygen.  3.  Status post right pneumonectomy.  4.  Obstructive sleep apnea: Continue home CPAP.  5.  Coronary artery disease: Continue home medication.  6.  Peripheral vascular disease  7.  DVT prophylaxis: Heparin.      Discharge Planning: I expect patient to be discharged to home in 2-3  days.        This document has been electronically signed by Jorge Garay MD on December 4, 2019 1:12 PM

## 2019-12-04 NOTE — PLAN OF CARE
Problem: NPPV/CPAP (Adult)  Goal: Signs and Symptoms of Listed Potential Problems Will be Absent, Minimized or Managed (NPPV/CPAP)  Outcome: Ongoing (interventions implemented as appropriate)  CPAP on standby. Pt using home machine with 2L bled in.

## 2019-12-04 NOTE — PLAN OF CARE
Problem: Patient Care Overview  Goal: Plan of Care Review  Outcome: Ongoing (interventions implemented as appropriate)   12/04/19 7395   Coping/Psychosocial   Plan of Care Reviewed With patient   OTHER   Outcome Summary initial assessment; VSS; currently on 2L nc; sats dropping to 80s when on room air; no complaints of pain throughout the night; will continue to monitor     Goal: Individualization and Mutuality  Outcome: Ongoing (interventions implemented as appropriate)    Goal: Discharge Needs Assessment  Outcome: Ongoing (interventions implemented as appropriate)    Goal: Interprofessional Rounds/Family Conf  Outcome: Ongoing (interventions implemented as appropriate)      Problem: Breathing Pattern Ineffective (Adult)  Goal: Effective Oxygenation/Ventilation  Outcome: Ongoing (interventions implemented as appropriate)    Goal: Anxiety/Fear Reduction  Outcome: Ongoing (interventions implemented as appropriate)

## 2019-12-04 NOTE — PLAN OF CARE
Problem: Patient Care Overview  Goal: Plan of Care Review  Outcome: Ongoing (interventions implemented as appropriate)   12/04/19 1443   Coping/Psychosocial   Plan of Care Reviewed With patient   Plan of Care Review   Progress no change   OTHER   Outcome Summary Oxygen still on the lower side 90-92 on 2L NC. Pt getting solumedrol and nebs. Continue to monitor.      Goal: Individualization and Mutuality  Outcome: Ongoing (interventions implemented as appropriate)    Goal: Discharge Needs Assessment  Outcome: Ongoing (interventions implemented as appropriate)    Goal: Interprofessional Rounds/Family Conf  Outcome: Ongoing (interventions implemented as appropriate)      Problem: Breathing Pattern Ineffective (Adult)  Goal: Effective Oxygenation/Ventilation  Outcome: Ongoing (interventions implemented as appropriate)    Goal: Anxiety/Fear Reduction  Outcome: Ongoing (interventions implemented as appropriate)      Problem: Chronic Obstructive Pulmonary Disease (Adult)  Goal: Signs and Symptoms of Listed Potential Problems Will be Absent, Minimized or Managed (Chronic Obstructive Pulmonary Disease)  Outcome: Ongoing (interventions implemented as appropriate)      Problem: NPPV/CPAP (Adult)  Goal: Signs and Symptoms of Listed Potential Problems Will be Absent, Minimized or Managed (NPPV/CPAP)  Outcome: Ongoing (interventions implemented as appropriate)      Problem: Fall Risk (Adult)  Goal: Identify Related Risk Factors and Signs and Symptoms  Outcome: Ongoing (interventions implemented as appropriate)    Goal: Absence of Fall  Outcome: Ongoing (interventions implemented as appropriate)

## 2019-12-05 PROCEDURE — 94799 UNLISTED PULMONARY SVC/PX: CPT

## 2019-12-05 PROCEDURE — 94660 CPAP INITIATION&MGMT: CPT

## 2019-12-05 PROCEDURE — 94760 N-INVAS EAR/PLS OXIMETRY 1: CPT

## 2019-12-05 PROCEDURE — 25010000002 HEPARIN (PORCINE) PER 1000 UNITS: Performed by: HOSPITALIST

## 2019-12-05 PROCEDURE — 25010000002 METHYLPREDNISOLONE PER 125 MG: Performed by: INTERNAL MEDICINE

## 2019-12-05 PROCEDURE — 25010000002 METHYLPREDNISOLONE PER 125 MG: Performed by: FAMILY MEDICINE

## 2019-12-05 RX ORDER — ASPIRIN 81 MG/1
81 TABLET ORAL DAILY
Status: DISCONTINUED | OUTPATIENT
Start: 2019-12-05 | End: 2019-12-08 | Stop reason: HOSPADM

## 2019-12-05 RX ORDER — AMLODIPINE BESYLATE 10 MG/1
10 TABLET ORAL DAILY
Status: DISCONTINUED | OUTPATIENT
Start: 2019-12-05 | End: 2019-12-08 | Stop reason: HOSPADM

## 2019-12-05 RX ORDER — METHYLPREDNISOLONE SODIUM SUCCINATE 125 MG/2ML
60 INJECTION, POWDER, LYOPHILIZED, FOR SOLUTION INTRAMUSCULAR; INTRAVENOUS EVERY 12 HOURS
Status: DISCONTINUED | OUTPATIENT
Start: 2019-12-05 | End: 2019-12-08 | Stop reason: HOSPADM

## 2019-12-05 RX ADMIN — FERROUS SULFATE TAB EC 324 MG (65 MG FE EQUIVALENT) 324 MG: 324 (65 FE) TABLET DELAYED RESPONSE at 17:21

## 2019-12-05 RX ADMIN — IPRATROPIUM BROMIDE AND ALBUTEROL SULFATE 3 ML: 2.5; .5 SOLUTION RESPIRATORY (INHALATION) at 11:54

## 2019-12-05 RX ADMIN — AMLODIPINE BESYLATE 10 MG: 10 TABLET ORAL at 13:42

## 2019-12-05 RX ADMIN — HEPARIN SODIUM 5000 UNITS: 5000 INJECTION INTRAVENOUS; SUBCUTANEOUS at 20:30

## 2019-12-05 RX ADMIN — HEPARIN SODIUM 5000 UNITS: 5000 INJECTION INTRAVENOUS; SUBCUTANEOUS at 06:42

## 2019-12-05 RX ADMIN — LABETALOL HYDROCHLORIDE 200 MG: 200 TABLET, FILM COATED ORAL at 08:03

## 2019-12-05 RX ADMIN — ASPIRIN 81 MG: 81 TABLET, COATED ORAL at 13:42

## 2019-12-05 RX ADMIN — HEPARIN SODIUM 5000 UNITS: 5000 INJECTION INTRAVENOUS; SUBCUTANEOUS at 13:46

## 2019-12-05 RX ADMIN — ACETAMINOPHEN 650 MG: 325 TABLET, FILM COATED ORAL at 13:46

## 2019-12-05 RX ADMIN — IPRATROPIUM BROMIDE AND ALBUTEROL SULFATE 3 ML: 2.5; .5 SOLUTION RESPIRATORY (INHALATION) at 07:52

## 2019-12-05 RX ADMIN — FERROUS SULFATE TAB EC 324 MG (65 MG FE EQUIVALENT) 324 MG: 324 (65 FE) TABLET DELAYED RESPONSE at 08:03

## 2019-12-05 RX ADMIN — METHYLPREDNISOLONE SODIUM SUCCINATE 60 MG: 125 INJECTION, POWDER, FOR SOLUTION INTRAMUSCULAR; INTRAVENOUS at 04:35

## 2019-12-05 RX ADMIN — BUDESONIDE AND FORMOTEROL FUMARATE DIHYDRATE 2 PUFF: 80; 4.5 AEROSOL RESPIRATORY (INHALATION) at 08:01

## 2019-12-05 RX ADMIN — SODIUM CHLORIDE, PRESERVATIVE FREE 10 ML: 5 INJECTION INTRAVENOUS at 20:30

## 2019-12-05 RX ADMIN — PANTOPRAZOLE SODIUM 40 MG: 40 TABLET, DELAYED RELEASE ORAL at 20:29

## 2019-12-05 RX ADMIN — IPRATROPIUM BROMIDE AND ALBUTEROL SULFATE 3 ML: 2.5; .5 SOLUTION RESPIRATORY (INHALATION) at 20:01

## 2019-12-05 RX ADMIN — LABETALOL HYDROCHLORIDE 200 MG: 200 TABLET, FILM COATED ORAL at 20:29

## 2019-12-05 RX ADMIN — ACETAMINOPHEN 650 MG: 325 TABLET, FILM COATED ORAL at 17:34

## 2019-12-05 RX ADMIN — IPRATROPIUM BROMIDE AND ALBUTEROL SULFATE 3 ML: 2.5; .5 SOLUTION RESPIRATORY (INHALATION) at 16:31

## 2019-12-05 RX ADMIN — METHYLPREDNISOLONE SODIUM SUCCINATE 60 MG: 125 INJECTION, POWDER, FOR SOLUTION INTRAMUSCULAR; INTRAVENOUS at 17:23

## 2019-12-05 RX ADMIN — SODIUM CHLORIDE, PRESERVATIVE FREE 10 ML: 5 INJECTION INTRAVENOUS at 08:04

## 2019-12-05 RX ADMIN — FUROSEMIDE 20 MG: 20 TABLET ORAL at 08:03

## 2019-12-05 RX ADMIN — CLOPIDOGREL BISULFATE 75 MG: 75 TABLET ORAL at 08:03

## 2019-12-05 RX ADMIN — BUDESONIDE AND FORMOTEROL FUMARATE DIHYDRATE 2 PUFF: 80; 4.5 AEROSOL RESPIRATORY (INHALATION) at 20:08

## 2019-12-05 NOTE — PROGRESS NOTES
[facility] Medicine Services  INPATIENT PROGRESS NOTE    Length of Stay: 1  Date of Admission: 12/2/2019  Primary Care Physician: Preston Dalton MD    Subjective   Chief Complaint: Shortness of breath  HPI: Patient states that he is doing okay this morning.  He was feeling some better yesterday, but got significantly more short of breath overnight.    Review of Systems   Constitutional: Positive for fatigue. Negative for chills and fever.   HENT: Negative for congestion and sore throat.    Respiratory: Positive for cough, shortness of breath and wheezing.    Cardiovascular: Negative for chest pain, palpitations and leg swelling.   Gastrointestinal: Negative for abdominal pain, constipation, diarrhea, nausea and vomiting.   Genitourinary: Negative for dysuria and frequency.   Musculoskeletal: Negative for arthralgias and myalgias.   Skin: Negative for wound.   Neurological: Negative for dizziness, weakness, light-headedness, numbness and headaches.        All pertinent negatives and positives are as above. All other systems have been reviewed and are negative unless otherwise stated.     Objective    Temp:  [97.2 °F (36.2 °C)-99 °F (37.2 °C)] 99 °F (37.2 °C)  Heart Rate:  [] 59  Resp:  [18-20] 20  BP: (113-172)/(68-85) 172/77    Physical Exam   Constitutional: He is oriented to person, place, and time. He appears well-developed and well-nourished.   HENT:   Head: Normocephalic and atraumatic.   Mouth/Throat: Oropharynx is clear and moist.   Eyes: EOM are normal. Pupils are equal, round, and reactive to light.   Neck: Normal range of motion. Neck supple. No thyromegaly present.   Cardiovascular: Normal rate, regular rhythm, normal heart sounds and intact distal pulses. Exam reveals no gallop and no friction rub.   No murmur heard.  Pulmonary/Chest: No accessory muscle usage. No respiratory distress. He has decreased breath sounds. He has wheezes. He has no  rales. He exhibits no tenderness.   Abdominal: Soft. Bowel sounds are normal. He exhibits no distension. There is no tenderness.   Musculoskeletal: He exhibits no edema or tenderness.   Lymphadenopathy:     He has no cervical adenopathy.   Neurological: He is alert and oriented to person, place, and time. No cranial nerve deficit.   Skin: Skin is warm and dry. No erythema.   Psychiatric: He has a normal mood and affect. His behavior is normal.   Vitals reviewed.      Results Review:  I have reviewed the labs, radiology results, and diagnostic studies.    Laboratory Data:   Results from last 7 days   Lab Units 12/02/19  1753   SODIUM mmol/L 133*   POTASSIUM mmol/L 4.7   CHLORIDE mmol/L 94*   CO2 mmol/L 29.0   BUN mg/dL 20   CREATININE mg/dL 0.99   GLUCOSE mg/dL 104*   CALCIUM mg/dL 9.3   BILIRUBIN mg/dL 0.4   ALK PHOS U/L 106   ALT (SGPT) U/L 13   AST (SGOT) U/L 23   ANION GAP mmol/L 10.0     Estimated Creatinine Clearance: 70.1 mL/min (by C-G formula based on SCr of 0.99 mg/dL).  Results from last 7 days   Lab Units 12/02/19  1753   MAGNESIUM mg/dL 1.9         Results from last 7 days   Lab Units 12/02/19  1753   WBC 10*3/mm3 9.86   HEMOGLOBIN g/dL 13.9   HEMATOCRIT % 42.5   PLATELETS 10*3/mm3 211           Culture Data:   No results found for: BLOODCX  No results found for: URINECX  No results found for: RESPCX  No results found for: WOUNDCX  No results found for: STOOLCX  No components found for: BODYFLD    Radiology Data:   Imaging Results (Last 24 Hours)     ** No results found for the last 24 hours. **          I have reviewed the patient's current medications.     Assessment/Plan     Active Hospital Problems    Diagnosis   • **Acute on chronic respiratory failure with hypoxia (CMS/HCC)   • COPD exacerbation (CMS/HCC)       Plan:    1.  Acute on chronic respiratory failure with hypoxia: Cont require supplemental O2 with Room air sats at rest dropping into 70s.  Minimal improvement in symptoms this am.  Echo  with normal ejection fraction, mild concentric hypertrophy, and grade 1 diastolic dysfunction.  Will need home O2.    2.  COPD exacerbation: Continue steroids, nebulizers, supplemental oxygen.  3.  Status post right pneumonectomy.  4.  Obstructive sleep apnea: Continue home CPAP.  5.  Coronary artery disease: Continue home medication.  6.  Peripheral vascular disease  7.  DVT prophylaxis: Heparin.      Discharge Planning: I expect patient to be discharged to home in 2-3 days.        This document has been electronically signed by Morris Grady MD on December 5, 2019 4:32 PM

## 2019-12-05 NOTE — PLAN OF CARE
Problem: Patient Care Overview  Goal: Plan of Care Review  Outcome: Ongoing (interventions implemented as appropriate)   12/05/19 0542   Coping/Psychosocial   Plan of Care Reviewed With patient   Plan of Care Review   Progress no change   OTHER   Outcome Summary VSS; pt still SOB and requiring o2 on 2L; no complaints throughout the night; will continue to monitor     Goal: Individualization and Mutuality  Outcome: Ongoing (interventions implemented as appropriate)    Goal: Discharge Needs Assessment  Outcome: Ongoing (interventions implemented as appropriate)    Goal: Interprofessional Rounds/Family Conf  Outcome: Ongoing (interventions implemented as appropriate)      Problem: Breathing Pattern Ineffective (Adult)  Goal: Effective Oxygenation/Ventilation  Outcome: Ongoing (interventions implemented as appropriate)    Goal: Anxiety/Fear Reduction  Outcome: Ongoing (interventions implemented as appropriate)      Problem: Chronic Obstructive Pulmonary Disease (Adult)  Goal: Signs and Symptoms of Listed Potential Problems Will be Absent, Minimized or Managed (Chronic Obstructive Pulmonary Disease)  Outcome: Ongoing (interventions implemented as appropriate)

## 2019-12-05 NOTE — PLAN OF CARE
Problem: Patient Care Overview  Goal: Plan of Care Review  Outcome: Ongoing (interventions implemented as appropriate)   12/05/19 4516   Coping/Psychosocial   Plan of Care Reviewed With patient   Plan of Care Review   Progress improving   OTHER   Outcome Summary VSS; pt still requiring 02 at 2L, sats drop down to 79% RA; Steriods tapered down; possible dischage in a few days with oxygen and nebs      Goal: Individualization and Mutuality  Outcome: Ongoing (interventions implemented as appropriate)    Goal: Discharge Needs Assessment  Outcome: Ongoing (interventions implemented as appropriate)    Goal: Interprofessional Rounds/Family Conf  Outcome: Ongoing (interventions implemented as appropriate)      Problem: Breathing Pattern Ineffective (Adult)  Goal: Effective Oxygenation/Ventilation  Outcome: Ongoing (interventions implemented as appropriate)    Goal: Anxiety/Fear Reduction  Outcome: Ongoing (interventions implemented as appropriate)      Problem: Chronic Obstructive Pulmonary Disease (Adult)  Goal: Signs and Symptoms of Listed Potential Problems Will be Absent, Minimized or Managed (Chronic Obstructive Pulmonary Disease)  Outcome: Ongoing (interventions implemented as appropriate)      Problem: NPPV/CPAP (Adult)  Goal: Signs and Symptoms of Listed Potential Problems Will be Absent, Minimized or Managed (NPPV/CPAP)  Outcome: Ongoing (interventions implemented as appropriate)

## 2019-12-06 LAB
ANION GAP SERPL CALCULATED.3IONS-SCNC: 8 MMOL/L (ref 5–15)
BASOPHILS # BLD AUTO: 0.02 10*3/MM3 (ref 0–0.2)
BASOPHILS NFR BLD AUTO: 0.1 % (ref 0–1.5)
BUN BLD-MCNC: 23 MG/DL (ref 8–23)
BUN/CREAT SERPL: 33.3 (ref 7–25)
CALCIUM SPEC-SCNC: 9.3 MG/DL (ref 8.6–10.5)
CHLORIDE SERPL-SCNC: 95 MMOL/L (ref 98–107)
CO2 SERPL-SCNC: 34 MMOL/L (ref 22–29)
CREAT BLD-MCNC: 0.69 MG/DL (ref 0.76–1.27)
DEPRECATED RDW RBC AUTO: 43.3 FL (ref 37–54)
EOSINOPHIL # BLD AUTO: 0 10*3/MM3 (ref 0–0.4)
EOSINOPHIL NFR BLD AUTO: 0 % (ref 0.3–6.2)
ERYTHROCYTE [DISTWIDTH] IN BLOOD BY AUTOMATED COUNT: 13.9 % (ref 12.3–15.4)
GFR SERPL CREATININE-BSD FRML MDRD: 115 ML/MIN/1.73
GLUCOSE BLD-MCNC: 101 MG/DL (ref 65–99)
HCT VFR BLD AUTO: 42.6 % (ref 37.5–51)
HGB BLD-MCNC: 13.9 G/DL (ref 13–17.7)
IMM GRANULOCYTES # BLD AUTO: 0.14 10*3/MM3 (ref 0–0.05)
IMM GRANULOCYTES NFR BLD AUTO: 0.9 % (ref 0–0.5)
LYMPHOCYTES # BLD AUTO: 0.69 10*3/MM3 (ref 0.7–3.1)
LYMPHOCYTES NFR BLD AUTO: 4.4 % (ref 19.6–45.3)
MCH RBC QN AUTO: 28 PG (ref 26.6–33)
MCHC RBC AUTO-ENTMCNC: 32.6 G/DL (ref 31.5–35.7)
MCV RBC AUTO: 85.7 FL (ref 79–97)
MONOCYTES # BLD AUTO: 0.97 10*3/MM3 (ref 0.1–0.9)
MONOCYTES NFR BLD AUTO: 6.2 % (ref 5–12)
NEUTROPHILS # BLD AUTO: 13.8 10*3/MM3 (ref 1.7–7)
NEUTROPHILS NFR BLD AUTO: 88.4 % (ref 42.7–76)
NRBC BLD AUTO-RTO: 0 /100 WBC (ref 0–0.2)
PLATELET # BLD AUTO: 214 10*3/MM3 (ref 140–450)
PMV BLD AUTO: 12.3 FL (ref 6–12)
POTASSIUM BLD-SCNC: 3.9 MMOL/L (ref 3.5–5.2)
RBC # BLD AUTO: 4.97 10*6/MM3 (ref 4.14–5.8)
SODIUM BLD-SCNC: 137 MMOL/L (ref 136–145)
WBC NRBC COR # BLD: 15.62 10*3/MM3 (ref 3.4–10.8)

## 2019-12-06 PROCEDURE — 25010000002 METHYLPREDNISOLONE PER 125 MG: Performed by: FAMILY MEDICINE

## 2019-12-06 PROCEDURE — 80048 BASIC METABOLIC PNL TOTAL CA: CPT | Performed by: FAMILY MEDICINE

## 2019-12-06 PROCEDURE — 25010000002 HEPARIN (PORCINE) PER 1000 UNITS: Performed by: HOSPITALIST

## 2019-12-06 PROCEDURE — 94760 N-INVAS EAR/PLS OXIMETRY 1: CPT

## 2019-12-06 PROCEDURE — 94799 UNLISTED PULMONARY SVC/PX: CPT

## 2019-12-06 PROCEDURE — 85025 COMPLETE CBC W/AUTO DIFF WBC: CPT | Performed by: FAMILY MEDICINE

## 2019-12-06 RX ADMIN — IPRATROPIUM BROMIDE AND ALBUTEROL SULFATE 3 ML: 2.5; .5 SOLUTION RESPIRATORY (INHALATION) at 15:11

## 2019-12-06 RX ADMIN — IPRATROPIUM BROMIDE AND ALBUTEROL SULFATE 3 ML: 2.5; .5 SOLUTION RESPIRATORY (INHALATION) at 12:12

## 2019-12-06 RX ADMIN — ACETAMINOPHEN 650 MG: 325 TABLET, FILM COATED ORAL at 05:05

## 2019-12-06 RX ADMIN — LABETALOL HYDROCHLORIDE 200 MG: 200 TABLET, FILM COATED ORAL at 20:02

## 2019-12-06 RX ADMIN — PANTOPRAZOLE SODIUM 40 MG: 40 TABLET, DELAYED RELEASE ORAL at 20:02

## 2019-12-06 RX ADMIN — METHYLPREDNISOLONE SODIUM SUCCINATE 60 MG: 125 INJECTION, POWDER, FOR SOLUTION INTRAMUSCULAR; INTRAVENOUS at 15:20

## 2019-12-06 RX ADMIN — HEPARIN SODIUM 5000 UNITS: 5000 INJECTION INTRAVENOUS; SUBCUTANEOUS at 20:02

## 2019-12-06 RX ADMIN — FERROUS SULFATE TAB EC 324 MG (65 MG FE EQUIVALENT) 324 MG: 324 (65 FE) TABLET DELAYED RESPONSE at 08:25

## 2019-12-06 RX ADMIN — BUDESONIDE AND FORMOTEROL FUMARATE DIHYDRATE 2 PUFF: 80; 4.5 AEROSOL RESPIRATORY (INHALATION) at 08:50

## 2019-12-06 RX ADMIN — ACETAMINOPHEN 650 MG: 325 TABLET, FILM COATED ORAL at 15:20

## 2019-12-06 RX ADMIN — SODIUM CHLORIDE, PRESERVATIVE FREE 10 ML: 5 INJECTION INTRAVENOUS at 08:29

## 2019-12-06 RX ADMIN — CLOPIDOGREL BISULFATE 75 MG: 75 TABLET ORAL at 08:25

## 2019-12-06 RX ADMIN — ACETAMINOPHEN 650 MG: 325 TABLET, FILM COATED ORAL at 20:02

## 2019-12-06 RX ADMIN — BUDESONIDE AND FORMOTEROL FUMARATE DIHYDRATE 2 PUFF: 80; 4.5 AEROSOL RESPIRATORY (INHALATION) at 19:38

## 2019-12-06 RX ADMIN — ACETAMINOPHEN 650 MG: 325 TABLET, FILM COATED ORAL at 10:49

## 2019-12-06 RX ADMIN — METHYLPREDNISOLONE SODIUM SUCCINATE 60 MG: 125 INJECTION, POWDER, FOR SOLUTION INTRAMUSCULAR; INTRAVENOUS at 05:05

## 2019-12-06 RX ADMIN — IPRATROPIUM BROMIDE AND ALBUTEROL SULFATE 3 ML: 2.5; .5 SOLUTION RESPIRATORY (INHALATION) at 19:38

## 2019-12-06 RX ADMIN — ASPIRIN 81 MG: 81 TABLET, COATED ORAL at 08:25

## 2019-12-06 RX ADMIN — SODIUM CHLORIDE, PRESERVATIVE FREE 10 ML: 5 INJECTION INTRAVENOUS at 20:14

## 2019-12-06 RX ADMIN — IPRATROPIUM BROMIDE AND ALBUTEROL SULFATE 3 ML: 2.5; .5 SOLUTION RESPIRATORY (INHALATION) at 08:40

## 2019-12-06 RX ADMIN — FERROUS SULFATE TAB EC 324 MG (65 MG FE EQUIVALENT) 324 MG: 324 (65 FE) TABLET DELAYED RESPONSE at 17:11

## 2019-12-06 RX ADMIN — AMLODIPINE BESYLATE 10 MG: 10 TABLET ORAL at 08:25

## 2019-12-06 RX ADMIN — HEPARIN SODIUM 5000 UNITS: 5000 INJECTION INTRAVENOUS; SUBCUTANEOUS at 05:05

## 2019-12-06 RX ADMIN — FUROSEMIDE 20 MG: 20 TABLET ORAL at 08:25

## 2019-12-06 RX ADMIN — LABETALOL HYDROCHLORIDE 200 MG: 200 TABLET, FILM COATED ORAL at 08:25

## 2019-12-06 RX ADMIN — HEPARIN SODIUM 5000 UNITS: 5000 INJECTION INTRAVENOUS; SUBCUTANEOUS at 13:07

## 2019-12-06 NOTE — PLAN OF CARE
Problem: Patient Care Overview  Goal: Plan of Care Review  Outcome: Ongoing (interventions implemented as appropriate)   12/06/19 0358   Coping/Psychosocial   Plan of Care Reviewed With patient   Plan of Care Review   Progress improving   OTHER   Outcome Summary Pt. requiring continuous oxygen, desat to 80's this shift on RA. Will continue to monitor.        Problem: Breathing Pattern Ineffective (Adult)  Goal: Effective Oxygenation/Ventilation  Outcome: Ongoing (interventions implemented as appropriate)    Goal: Anxiety/Fear Reduction  Outcome: Ongoing (interventions implemented as appropriate)      Problem: Chronic Obstructive Pulmonary Disease (Adult)  Goal: Signs and Symptoms of Listed Potential Problems Will be Absent, Minimized or Managed (Chronic Obstructive Pulmonary Disease)  Outcome: Ongoing (interventions implemented as appropriate)      Problem: NPPV/CPAP (Adult)  Goal: Signs and Symptoms of Listed Potential Problems Will be Absent, Minimized or Managed (NPPV/CPAP)  Outcome: Ongoing (interventions implemented as appropriate)

## 2019-12-06 NOTE — PROGRESS NOTES
[facility] Medicine Services  INPATIENT PROGRESS NOTE    Length of Stay: 2  Date of Admission: 12/2/2019  Primary Care Physician: Preston Dalton MD    Subjective   Chief Complaint: Shortness of breath  HPI: Pt continues to have SOA but reports that she feels better.  His SOA is worse with exertion.  He denies CP, palpitations.  He reports a good appetite.        Review of Systems   Constitutional: Positive for fatigue. Negative for chills and fever.   HENT: Negative for congestion and sore throat.    Respiratory: Positive for cough, shortness of breath and wheezing.    Cardiovascular: Negative for chest pain, palpitations and leg swelling.   Gastrointestinal: Negative for abdominal pain, constipation, diarrhea, nausea and vomiting.   Genitourinary: Negative for dysuria and frequency.   Musculoskeletal: Negative for arthralgias and myalgias.   Skin: Negative for wound.   Neurological: Negative for dizziness, weakness, light-headedness, numbness and headaches.        All pertinent negatives and positives are as above. All other systems have been reviewed and are negative unless otherwise stated.     Objective    Temp:  [97.4 °F (36.3 °C)-98.1 °F (36.7 °C)] 97.8 °F (36.6 °C)  Heart Rate:  [76-91] 89  Resp:  [18-20] 18  BP: (116-142)/(60-74) 133/60    Physical Exam   Constitutional: He is oriented to person, place, and time. He appears well-developed and well-nourished.   HENT:   Head: Normocephalic and atraumatic.   Mouth/Throat: Oropharynx is clear and moist.   Eyes: EOM are normal. Pupils are equal, round, and reactive to light.   Neck: Normal range of motion. Neck supple. No thyromegaly present.   Cardiovascular: Normal rate, regular rhythm, normal heart sounds and intact distal pulses. Exam reveals no gallop and no friction rub.   No murmur heard.  Pulmonary/Chest: No accessory muscle usage. No respiratory distress. He has decreased breath sounds. He has wheezes.  He has no rales. He exhibits no tenderness.   Abdominal: Soft. Bowel sounds are normal. He exhibits no distension. There is no tenderness.   Musculoskeletal: He exhibits no edema or tenderness.   Lymphadenopathy:     He has no cervical adenopathy.   Neurological: He is alert and oriented to person, place, and time. No cranial nerve deficit.   Skin: Skin is warm and dry. No erythema.   Psychiatric: He has a normal mood and affect. His behavior is normal.   Vitals reviewed.      Results Review:  I have reviewed the labs, radiology results, and diagnostic studies.    Laboratory Data:   Results from last 7 days   Lab Units 12/06/19  0516 12/02/19  1753   SODIUM mmol/L 137 133*   POTASSIUM mmol/L 3.9 4.7   CHLORIDE mmol/L 95* 94*   CO2 mmol/L 34.0* 29.0   BUN mg/dL 23 20   CREATININE mg/dL 0.69* 0.99   GLUCOSE mg/dL 101* 104*   CALCIUM mg/dL 9.3 9.3   BILIRUBIN mg/dL  --  0.4   ALK PHOS U/L  --  106   ALT (SGPT) U/L  --  13   AST (SGOT) U/L  --  23   ANION GAP mmol/L 8.0 10.0     Estimated Creatinine Clearance: 87.2 mL/min (A) (by C-G formula based on SCr of 0.69 mg/dL (L)).  Results from last 7 days   Lab Units 12/02/19  1753   MAGNESIUM mg/dL 1.9         Results from last 7 days   Lab Units 12/06/19  0516 12/02/19  1753   WBC 10*3/mm3 15.62* 9.86   HEMOGLOBIN g/dL 13.9 13.9   HEMATOCRIT % 42.6 42.5   PLATELETS 10*3/mm3 214 211           Culture Data:   No results found for: BLOODCX  No results found for: URINECX  No results found for: RESPCX  No results found for: WOUNDCX  No results found for: STOOLCX  No components found for: BODYFLD    Radiology Data:   Imaging Results (Last 24 Hours)     ** No results found for the last 24 hours. **          I have reviewed the patient's current medications.     Assessment/Plan     Active Hospital Problems    Diagnosis   • **Acute on chronic respiratory failure with hypoxia (CMS/HCC)   • COPD exacerbation (CMS/[facility])       Plan:    1.  Acute on chronic respiratory failure with  hypoxia: Improving.  Check ambulatory O2 sat.  Cont nebs, steroids, O2 support.  Echo with normal ejection fraction, mild concentric hypertrophy, and grade 1 diastolic dysfunction.  Will likely need home O2.    2.  COPD exacerbation: Continue steroids, nebulizers, supplemental oxygen.  As above.    3.  Status post right pneumonectomy.  4.  Obstructive sleep apnea: Continue home CPAP.  5.  Coronary artery disease: Continue home medication.  6.  Peripheral vascular disease  7.  DVT prophylaxis: Heparin.      Discharge Planning: I expect patient to be discharged to home in 2 days.        This document has been electronically signed by Morris Grady MD on December 6, 2019 5:43 PM

## 2019-12-07 PROCEDURE — 94799 UNLISTED PULMONARY SVC/PX: CPT

## 2019-12-07 PROCEDURE — 94760 N-INVAS EAR/PLS OXIMETRY 1: CPT

## 2019-12-07 PROCEDURE — 25010000002 HEPARIN (PORCINE) PER 1000 UNITS: Performed by: HOSPITALIST

## 2019-12-07 PROCEDURE — 25010000002 METHYLPREDNISOLONE PER 125 MG: Performed by: FAMILY MEDICINE

## 2019-12-07 RX ADMIN — IPRATROPIUM BROMIDE AND ALBUTEROL SULFATE 3 ML: 2.5; .5 SOLUTION RESPIRATORY (INHALATION) at 11:50

## 2019-12-07 RX ADMIN — HEPARIN SODIUM 5000 UNITS: 5000 INJECTION INTRAVENOUS; SUBCUTANEOUS at 20:34

## 2019-12-07 RX ADMIN — ACETAMINOPHEN 650 MG: 325 TABLET, FILM COATED ORAL at 10:33

## 2019-12-07 RX ADMIN — FERROUS SULFATE TAB EC 324 MG (65 MG FE EQUIVALENT) 324 MG: 324 (65 FE) TABLET DELAYED RESPONSE at 17:42

## 2019-12-07 RX ADMIN — ACETAMINOPHEN 650 MG: 325 TABLET, FILM COATED ORAL at 23:25

## 2019-12-07 RX ADMIN — BUDESONIDE AND FORMOTEROL FUMARATE DIHYDRATE 2 PUFF: 80; 4.5 AEROSOL RESPIRATORY (INHALATION) at 19:53

## 2019-12-07 RX ADMIN — HEPARIN SODIUM 5000 UNITS: 5000 INJECTION INTRAVENOUS; SUBCUTANEOUS at 06:03

## 2019-12-07 RX ADMIN — IPRATROPIUM BROMIDE AND ALBUTEROL SULFATE 3 ML: 2.5; .5 SOLUTION RESPIRATORY (INHALATION) at 19:53

## 2019-12-07 RX ADMIN — ASPIRIN 81 MG: 81 TABLET, COATED ORAL at 08:34

## 2019-12-07 RX ADMIN — FUROSEMIDE 20 MG: 20 TABLET ORAL at 08:34

## 2019-12-07 RX ADMIN — METHYLPREDNISOLONE SODIUM SUCCINATE 60 MG: 125 INJECTION, POWDER, FOR SOLUTION INTRAMUSCULAR; INTRAVENOUS at 03:46

## 2019-12-07 RX ADMIN — IPRATROPIUM BROMIDE AND ALBUTEROL SULFATE 3 ML: 2.5; .5 SOLUTION RESPIRATORY (INHALATION) at 14:57

## 2019-12-07 RX ADMIN — CLOPIDOGREL BISULFATE 75 MG: 75 TABLET ORAL at 08:34

## 2019-12-07 RX ADMIN — LABETALOL HYDROCHLORIDE 200 MG: 200 TABLET, FILM COATED ORAL at 08:34

## 2019-12-07 RX ADMIN — ACETAMINOPHEN 650 MG: 325 TABLET, FILM COATED ORAL at 17:42

## 2019-12-07 RX ADMIN — PANTOPRAZOLE SODIUM 40 MG: 40 TABLET, DELAYED RELEASE ORAL at 20:34

## 2019-12-07 RX ADMIN — AMLODIPINE BESYLATE 10 MG: 10 TABLET ORAL at 08:34

## 2019-12-07 RX ADMIN — LABETALOL HYDROCHLORIDE 200 MG: 200 TABLET, FILM COATED ORAL at 20:34

## 2019-12-07 RX ADMIN — BUDESONIDE AND FORMOTEROL FUMARATE DIHYDRATE 2 PUFF: 80; 4.5 AEROSOL RESPIRATORY (INHALATION) at 07:40

## 2019-12-07 RX ADMIN — SODIUM CHLORIDE, PRESERVATIVE FREE 10 ML: 5 INJECTION INTRAVENOUS at 08:35

## 2019-12-07 RX ADMIN — IPRATROPIUM BROMIDE AND ALBUTEROL SULFATE 3 ML: 2.5; .5 SOLUTION RESPIRATORY (INHALATION) at 07:40

## 2019-12-07 RX ADMIN — HEPARIN SODIUM 5000 UNITS: 5000 INJECTION INTRAVENOUS; SUBCUTANEOUS at 14:10

## 2019-12-07 RX ADMIN — METHYLPREDNISOLONE SODIUM SUCCINATE 60 MG: 125 INJECTION, POWDER, FOR SOLUTION INTRAMUSCULAR; INTRAVENOUS at 17:42

## 2019-12-07 RX ADMIN — ACETAMINOPHEN 650 MG: 325 TABLET, FILM COATED ORAL at 01:50

## 2019-12-07 RX ADMIN — FERROUS SULFATE TAB EC 324 MG (65 MG FE EQUIVALENT) 324 MG: 324 (65 FE) TABLET DELAYED RESPONSE at 08:34

## 2019-12-07 NOTE — PLAN OF CARE
Problem: Patient Care Overview  Goal: Individualization and Mutuality  Outcome: Ongoing (interventions implemented as appropriate)    Goal: Discharge Needs Assessment  Outcome: Ongoing (interventions implemented as appropriate)      Problem: Breathing Pattern Ineffective (Adult)  Goal: Effective Oxygenation/Ventilation  Outcome: Ongoing (interventions implemented as appropriate)    Goal: Anxiety/Fear Reduction  Outcome: Ongoing (interventions implemented as appropriate)      Problem: Chronic Obstructive Pulmonary Disease (Adult)  Goal: Signs and Symptoms of Listed Potential Problems Will be Absent, Minimized or Managed (Chronic Obstructive Pulmonary Disease)  Outcome: Ongoing (interventions implemented as appropriate)

## 2019-12-08 VITALS
BODY MASS INDEX: 21.7 KG/M2 | RESPIRATION RATE: 18 BRPM | SYSTOLIC BLOOD PRESSURE: 162 MMHG | DIASTOLIC BLOOD PRESSURE: 99 MMHG | WEIGHT: 151.6 LBS | TEMPERATURE: 98.4 F | HEIGHT: 70 IN | HEART RATE: 85 BPM | OXYGEN SATURATION: 94 %

## 2019-12-08 PROCEDURE — 25010000002 METHYLPREDNISOLONE PER 125 MG: Performed by: FAMILY MEDICINE

## 2019-12-08 PROCEDURE — 94799 UNLISTED PULMONARY SVC/PX: CPT

## 2019-12-08 PROCEDURE — 25010000002 HEPARIN (PORCINE) PER 1000 UNITS: Performed by: HOSPITALIST

## 2019-12-08 RX ORDER — METHYLPREDNISOLONE 4 MG/1
TABLET ORAL
Qty: 1 EACH | Refills: 0 | Status: SHIPPED | OUTPATIENT
Start: 2019-12-08 | End: 2020-12-11

## 2019-12-08 RX ORDER — IPRATROPIUM BROMIDE AND ALBUTEROL SULFATE 2.5; .5 MG/3ML; MG/3ML
3 SOLUTION RESPIRATORY (INHALATION) 4 TIMES DAILY PRN
Qty: 360 ML | Refills: 1 | Status: SHIPPED | OUTPATIENT
Start: 2019-12-08 | End: 2022-01-01

## 2019-12-08 RX ADMIN — FERROUS SULFATE TAB EC 324 MG (65 MG FE EQUIVALENT) 324 MG: 324 (65 FE) TABLET DELAYED RESPONSE at 08:20

## 2019-12-08 RX ADMIN — HEPARIN SODIUM 5000 UNITS: 5000 INJECTION INTRAVENOUS; SUBCUTANEOUS at 05:30

## 2019-12-08 RX ADMIN — BUDESONIDE AND FORMOTEROL FUMARATE DIHYDRATE 2 PUFF: 80; 4.5 AEROSOL RESPIRATORY (INHALATION) at 07:13

## 2019-12-08 RX ADMIN — METHYLPREDNISOLONE SODIUM SUCCINATE 60 MG: 125 INJECTION, POWDER, FOR SOLUTION INTRAMUSCULAR; INTRAVENOUS at 05:31

## 2019-12-08 RX ADMIN — FUROSEMIDE 20 MG: 20 TABLET ORAL at 08:20

## 2019-12-08 RX ADMIN — ASPIRIN 81 MG: 81 TABLET, COATED ORAL at 08:20

## 2019-12-08 RX ADMIN — SODIUM CHLORIDE, PRESERVATIVE FREE 10 ML: 5 INJECTION INTRAVENOUS at 08:23

## 2019-12-08 RX ADMIN — IPRATROPIUM BROMIDE AND ALBUTEROL SULFATE 3 ML: 2.5; .5 SOLUTION RESPIRATORY (INHALATION) at 11:15

## 2019-12-08 RX ADMIN — LABETALOL HYDROCHLORIDE 200 MG: 200 TABLET, FILM COATED ORAL at 08:20

## 2019-12-08 RX ADMIN — CLOPIDOGREL BISULFATE 75 MG: 75 TABLET ORAL at 08:20

## 2019-12-08 RX ADMIN — IPRATROPIUM BROMIDE AND ALBUTEROL SULFATE 3 ML: 2.5; .5 SOLUTION RESPIRATORY (INHALATION) at 07:13

## 2019-12-08 RX ADMIN — AMLODIPINE BESYLATE 10 MG: 10 TABLET ORAL at 08:20

## 2019-12-08 NOTE — DISCHARGE PLACEMENT REQUEST
"Juanito Pruitt (64 y.o. Male)     Date of Birth Social Security Number Address Home Phone MRN    1955  [address] [facility] [address] [phone] [id number]    [place]os[place] Marital Status          None        Admission Date Admission Type Admitting Provider Attending Provider Department, Room/Bed    12/2/19 Emergency Jorge Garay MD Kaldas, Amir I, MD [address], 326/1    Discharge Date Discharge Disposition Discharge Destination         Home or Self Care              Attending Provider:  Greg Mayorga MD    Allergies:  Penicillins    Isolation:  None   Infection:  None   Code Status:  CPR    Ht:  177.8 cm (70\")   Wt:  68.8 kg (151 lb 9.6 oz)    Admission Cmt:  None   Principal Problem:  Acute on chronic respiratory failure with hypoxia (CMS/HCC) [J96.21]                 Active Insurance as of 12/2/2019     Primary Coverage     Payor Plan Insurance Group Employer/Plan Group    [employer] [id number]     Payor Plan Address Payor Plan Phone Number Payor Plan Fax Number Effective Dates    [address] [phone]  12/1/2009 - None Entered    [place] [address]       Subscriber Name Subscriber Birth Date Member ID       [id number]                 Emergency Contacts      (Rel.) Home Phone Work Phone Mobile Phone    Carol Pruitt (Spouse) [phone] -- [phone]    Anshu Pruitt (Son) [phone] -- [phone]            Insurance Information                [employer] Phone: [phone]    Subscriber: Juanito Pruitt Subscriber#: [id number]    Group#: [id number] Precert#:           Vital Signs (last day)     Date/Time   Temp   Temp src   Pulse   Resp   BP   Patient Position   SpO2    12/08/19 1115   --   --   83   18   --   --   94    12/08/19 0729   --   --   --   --   --   --   98    12/08/19 0724   98.4 (36.9)   Temporal   82   18   162/99   Lying   (!) 84    " "19 0720   --   --   88   18   --   --   94    19 0713   --   --   84   18   --   --   91    19 0522   97 (36.1)   Temporal   86   18   124/67   Lying   91    19 0051   96.8 (36)   Temporal   79   18   147/63   Lying   95    19 0012   --   --   70   --   --   --   --    19 1953   --   --   70   18   --   --   98    19 1949   96.9 (36.1)   Oral   70   18   148/76   Lying   98    19 1552   --   --   89   --   --   --   --    19 1538   --   --   80   18   127/66   Lying   94    19 1457   --   --   78   18   --   --   90    19 1223   --   --   78   18   151/72   Lying   94    19 1157   --   --   77   18   --   --   94    19 1150   --   --   78   18   --   --   92    19 0757   --   --   79   --   --   --   --    19 0749   --   --   84   18   --   --   94    19 0740   --   --   88   18   --   --   95    19 0700   96.9 (36.1)   --   87   18   129/79   Lying   96            [address] [address]  Dept. Phone:  [phone]  Dept. Fax:   Date Ordered: Dec 8, 2019         Patient:  Juanito Pruitt MRN:  [id number]   [address] [place] [id number] :  1955  SSN:    Phone: [phone] Sex:  M     Weight: 68.8 kg (151 lb 9.6 oz)         Ht Readings from Last 1 Encounters:   19 177.8 cm (70\")         Oxygen Therapy         (Order ID: [id number])    Diagnosis:  COPD exacerbation (CMS/HCC) (J44.1 [ICD-10-CM] 491.21 [ICD-9-CM])   Quantity:  1     Delivery Modality: Nasal Cannula  Liters Per Minute: 2  Duration: Continuous  Equipment:  Oxygen Concentrator &  &  Portable Gaseous Oxygen System & Portable Oxygen Contents Gaseous &  Conserving Regulator  The face to face evaluation was performed on: 2019  Additional providers who completed a face to face evaluation of the patient: LORRIE FAULKNER [[id number]]  Length of " "Need (99 Months = Lifetime): 99 Months = Lifetime        Authorizing Provider's Phone: [phone]   Authorizing Provider:Lorrie Grady MD  Authorizing Provider's NPI: [id number]  Order Entered By: Lorrie Grady MD 2019 11:07 AM     Electronically signed by: Lorrie Grady MD 2019 11:07 AM        [address] [address]  Dept. Phone:  [phone]  Dept. Fax:   Date Ordered: Dec 8, 2019         Patient:  Juanito Pruitt MRN:  [id number]   [address] [place] [address] :  1955  SSN:    Phone: [phone] Sex:  M     Weight: 68.8 kg (151 lb 9.6 oz)         Ht Readings from Last 1 Encounters:   19 177.8 cm (70\")         Home Nebulizer          (Order ID: [id number])    Diagnosis:  COPD exacerbation (CMS/[facility]) (J44.1 [ICD-10-CM] 491.21 [ICD-9-CM])   Quantity:  1     Nebulizer Equipment:  Nebulizer w/ Compressor  Nebulizer Accessories:  Nebulizer Kit - Administration Set, Non-Disposable  The face to face evaluation was performed on: 2019  Additional providers who completed a face to face evaluation of the patient: LORRIE GRADY [[id number]]  Length of Need (99 Months = Lifetime): 99 Months = Lifetime        Authorizing Provider's Phone: [phone]   Authorizing Provider:Lorrie Grady MD  Authorizing Provider's NPI: [id number]  Order Entered By: Lorrie Grady MD 2019 11:07 AM     Electronically signed by: Lorrie Grady MD 2019 11:07 AM        [address] [address]  Dept. Phone:  [phone]  Dept. Fax:   Date Ordered: Dec 8, 2019         Patient:  Juanito Pruitt MRN:  [id number]   [address] [place] [address] :  1955  SSN:    Phone: [phone] Sex:  M     Weight: 68.8 kg (151 lb 9.6 oz)         Ht Readings from Last 1 Encounters:   19 177.8 cm (70\")    "      Home Nebulizer Accessories            (Order ID: [id number])    Diagnosis:  COPD exacerbation (CMS/[facility]) (J44.1 [ICD-10-CM] 491.21 [ICD-9-CM])   Quantity:  1     Nebulizer Accessories:  Nebulizer Kit - Admin. Set, Non-Disposable (1 per 6 months)  Length of Need (99 Months = Lifetime): 99 Months = Lifetime        Authorizing Provider's Phone: [phone]   Authorizing Provider:Morris Grady MD  Authorizing Provider's NPI: [id number]  Order Entered By: Morris Grady MD 12/8/2019 11:07 AM     Electronically signed by: Morris Grady MD 12/8/2019 11:07 AM

## 2019-12-08 NOTE — DISCHARGE SUMMARY
[facility] Medicine Services  DISCHARGE SUMMARY       Date of Admission: 12/2/2019  Date of Discharge:  12/8/2019  Primary Care Physician: Preston Dalton MD    Presenting Problem/History of Present Illness:  COPD exacerbation (CMS/[facility]) [J44.1]  Hypotension, unspecified hypotension type [I95.9]  Acute on chronic congestive heart failure, unspecified heart failure type (CMS/HCC) [I50.9]  COPD exacerbation (CMS/[facility]) [J44.1]       Final Discharge Diagnoses:  Active Hospital Problems    Diagnosis   • **Acute on chronic respiratory failure with hypoxia (CMS/[facility])   • COPD exacerbation (CMS/[facility])       Consults:   Consults     No orders found from 11/3/2019 to 12/3/2019.          Procedures Performed:                 Pertinent Test Results:   Lab Results (most recent)     Procedure Component Value Units Date/Time    Basic Metabolic Panel [[id number]]  (Abnormal) Collected:  12/06/19 0516    Specimen:  Blood Updated:  12/06/19 0609     Glucose 101 mg/dL      BUN 23 mg/dL      Creatinine 0.69 mg/dL      Sodium 137 mmol/L      Potassium 3.9 mmol/L      Chloride 95 mmol/L      CO2 34.0 mmol/L      Calcium 9.3 mg/dL      eGFR Non African Amer 115 mL/min/1.73      BUN/Creatinine Ratio 33.3     Anion Gap 8.0 mmol/L     Narrative:       GFR Normal >60  Chronic Kidney Disease <60  Kidney Failure <15    CBC & Differential [[id number]] Collected:  12/06/19 0516    Specimen:  Blood Updated:  12/06/19 0549    Narrative:       The following orders were created for panel order CBC & Differential.  Procedure                               Abnormality         Status                     ---------                               -----------         ------                     CBC Auto Differential[[id number]]        Abnormal            Final result                 Please view results for these tests on the individual orders.    CBC Auto Differential [[id number]]  (Abnormal) Collected:  12/06/19 0516     Specimen:  Blood Updated:  12/06/19 0549     WBC 15.62 10*3/mm3      RBC 4.97 10*6/mm3      Hemoglobin 13.9 g/dL      Hematocrit 42.6 %      MCV 85.7 fL      MCH 28.0 pg      MCHC 32.6 g/dL      RDW 13.9 %      RDW-SD 43.3 fl      MPV 12.3 fL      Platelets 214 10*3/mm3      Neutrophil % 88.4 %      Lymphocyte % 4.4 %      Monocyte % 6.2 %      Eosinophil % 0.0 %      Basophil % 0.1 %      Immature Grans % 0.9 %      Neutrophils, Absolute 13.80 10*3/mm3      Lymphocytes, Absolute 0.69 10*3/mm3      Monocytes, Absolute 0.97 10*3/mm3      Eosinophils, Absolute 0.00 10*3/mm3      Basophils, Absolute 0.02 10*3/mm3      Immature Grans, Absolute 0.14 10*3/mm3      nRBC 0.0 /100 WBC     Influenza Antigen, Rapid - Swab, Nasopharynx [[id number]]  (Normal) Collected:  12/02/19 1946    Specimen:  Swab from Nasopharynx Updated:  12/02/19 2009     Influenza A Ag, EIA Negative     Influenza B Ag, EIA Negative    CK [[id number]]  (Normal) Collected:  12/02/19 1753    Specimen:  Blood Updated:  12/02/19 1937     Creatine Kinase 197 U/L     Magnesium [[id number]]  (Normal) Collected:  12/02/19 1753    Specimen:  Blood Updated:  12/02/19 1937     Magnesium 1.9 mg/dL     [place] Draw [[id number]] Collected:  12/02/19 1753    Specimen:  Blood Updated:  12/02/19 1900    Narrative:       The following orders were created for panel order [place] Draw.  Procedure                               Abnormality         Status                     ---------                               -----------         ------                     Light Blue Top[[id number]]                                   Final result               Green Top (Gel)[[id number]]                                  Final result               Lavender Top[[id number]]                                     Final result               Gold Top - SST[[id number]]                                   Final result                 Please view results for these tests on the individual orders.    Lavender  Top [[id number]] Collected:  12/02/19 1753    Specimen:  Blood Updated:  12/02/19 1900     Extra Tube hold for add-on     Comment: Auto resulted       Light Blue Top [[id number]] Collected:  12/02/19 1753    Specimen:  Blood Updated:  12/02/19 1900     Extra Tube hold for add-on     Comment: Auto resulted       Green Top (Gel) [[id number]] Collected:  12/02/19 1753    Specimen:  Blood Updated:  12/02/19 1900     Extra Tube Hold for add-ons.     Comment: Auto resulted.       Gold Top - SST [[id number]] Collected:  12/02/19 1753    Specimen:  Blood Updated:  12/02/19 1900     Extra Tube Hold for add-ons.     Comment: Auto resulted.       Comprehensive Metabolic Panel [[id number]]  (Abnormal) Collected:  12/02/19 1753    Specimen:  Blood Updated:  12/02/19 1821     Glucose 104 mg/dL      BUN 20 mg/dL      Creatinine 0.99 mg/dL      Sodium 133 mmol/L      Potassium 4.7 mmol/L      Chloride 94 mmol/L      CO2 29.0 mmol/L      Calcium 9.3 mg/dL      Total Protein 7.1 g/dL      Albumin 4.20 g/dL      ALT (SGPT) 13 U/L      AST (SGOT) 23 U/L      Alkaline Phosphatase 106 U/L      Total Bilirubin 0.4 mg/dL      eGFR Non African Amer 76 mL/min/1.73      Globulin 2.9 gm/dL      A/G Ratio 1.4 g/dL      BUN/Creatinine Ratio 20.2     Anion Gap 10.0 mmol/L     Narrative:       GFR Normal >60  Chronic Kidney Disease <60  Kidney Failure <15    Troponin [[id number]]  (Normal) Collected:  12/02/19 1753    Specimen:  Blood Updated:  12/02/19 1820     Troponin T <0.010 ng/mL     Narrative:       Troponin T Reference Range:  <= 0.03 ng/mL-   Negative for AMI  >0.03 ng/mL-     Abnormal for myocardial necrosis.  Clinicians would have to utilize clinical acumen, EKG, Troponin and serial changes to determine if it is an Acute Myocardial Infarction or myocardial injury due to an underlying chronic condition.     BNP [[id number]]  (Abnormal) Collected:  12/02/19 1753    Specimen:  Blood Updated:  12/02/19 1819     proBNP 3,321.0 pg/mL     Narrative:        Among patients with dyspnea, NT-proBNP is highly sensitive for the detection of acute congestive heart failure. In addition NT-proBNP of <300 pg/ml effectively rules out acute congestive heart failure with 99% negative predictive value.    CBC & Differential [[id number]] Collected:  12/02/19 1753    Specimen:  Blood Updated:  12/02/19 1801    Narrative:       The following orders were created for panel order CBC & Differential.  Procedure                               Abnormality         Status                     ---------                               -----------         ------                     CBC Auto Differential[[id number]]        Abnormal            Final result                 Please view results for these tests on the individual orders.    CBC Auto Differential [[id number]]  (Abnormal) Collected:  12/02/19 1753    Specimen:  Blood Updated:  12/02/19 1801     WBC 9.86 10*3/mm3      RBC 4.96 10*6/mm3      Hemoglobin 13.9 g/dL      Hematocrit 42.5 %      MCV 85.7 fL      MCH 28.0 pg      MCHC 32.7 g/dL      RDW 14.1 %      RDW-SD 43.8 fl      MPV 12.1 fL      Platelets 211 10*3/mm3      Neutrophil % 81.5 %      Lymphocyte % 12.0 %      Monocyte % 5.8 %      Eosinophil % 0.1 %      Basophil % 0.2 %      Immature Grans % 0.4 %      Neutrophils, Absolute 8.04 10*3/mm3      Lymphocytes, Absolute 1.18 10*3/mm3      Monocytes, Absolute 0.57 10*3/mm3      Eosinophils, Absolute 0.01 10*3/mm3      Basophils, Absolute 0.02 10*3/mm3      Immature Grans, Absolute 0.04 10*3/mm3      nRBC 0.0 /100 WBC         Imaging Results (Most Recent)     Procedure Component Value Units Date/Time    XR Chest 1 View [[id number]] Collected:  12/02/19 1723     Updated:  12/02/19 1748    Narrative:       PROCEDURE: XR CHEST 1 VW    VIEWS:Single    INDICATION: Shortness of breath    COMPARISON: CXR: 1/15/2016    FINDINGS:       -Postoperative changes typical of right pneumonectomy with  rightward shift of the heart and mediastinal  "contents is present,  as before. There is hyperinflation of the left lung, extending  across midline. Old right-sided rib fractures appear similar to  prior study      Impression:       Stable postoperative changes status post right pneumonectomy,  without acute abnormality identified      Electronically signed by:  Alecia Valenzuela MD  12/2/2019 5:47 PM CST  Workstation: [id number]          Chief Complaint on Day of Discharge: none     Hospital Course:  The patient is a 64 y.o. male who presented to [facility] with SOA.  He was admitted and treated with O2 support, IV steroids, and nebulizer treatments.  He responded to treatment but remained dependent on oxygen.  His Cpap was continued at night.  On the day of dc he was feeling improved and ready for dc home.  Home O2 and nebulizer were arranged.  He is to return to work in one week.  His wife was at bedside and all questions and concerns were addressed.  Smoking cessation was encouraged.      Condition on Discharge:  Stable   Physical Exam on Discharge:  /99 (BP Location: Right arm, Patient Position: Lying)   Pulse 85   Temp 98.4 °F (36.9 °C) (Temporal)   Resp 18   Ht 177.8 cm (70\")   Wt 68.8 kg (151 lb 9.6 oz)   SpO2 94%   BMI 21.75 kg/m²   Physical Exam   Constitutional: He is oriented to person, place, and time. He appears well-developed and well-nourished.   HENT:   Head: Normocephalic and atraumatic.   Mouth/Throat: Oropharynx is clear and moist.   Eyes: Pupils are equal, round, and reactive to light. EOM are normal.   Neck: Neck supple. No tracheal deviation present.   Cardiovascular: Normal rate and regular rhythm.   No murmur heard.  Pulmonary/Chest: Effort normal. He has wheezes (basialr expiratory).   Abdominal: Soft. Bowel sounds are normal. There is no tenderness.   Musculoskeletal: He exhibits no edema or tenderness.   Lymphadenopathy:     He has no cervical adenopathy.   Neurological: He is alert and oriented to person, " place, and time. No cranial nerve deficit.   Skin: Skin is warm and dry. No rash noted.         Discharge Disposition:  Home or Self Care    Discharge Medications:     Discharge Medications      New Medications      Instructions Start Date   ipratropium-albuterol 0.5-2.5 mg/3 ml nebulizer  Commonly known as:  DUO-NEB   3 mL, Nebulization, 4 Times Daily PRN      methylPREDNISolone 4 MG tablet  Commonly known as:  MEDROL (PIO)  Notes to patient:  Take as directed   Take as directed on package instructions.         Continue These Medications      Instructions Start Date   albuterol sulfate  (90 Base) MCG/ACT inhaler  Commonly known as:  PROVENTIL HFA;VENTOLIN HFA;PROAIR HFA   2 puffs, Inhalation, Every 4 Hours PRN      amLODIPine 10 MG tablet  Commonly known as:  NORVASC   TAKE ONE TABLET BY MOUTH DAILY      aspirin 81 MG EC tablet   81 mg, Oral, Daily, Instructed to cont taking      budesonide-formoterol 80-4.5 MCG/ACT inhaler  Commonly known as:  SYMBICORT   2 puffs, Inhalation      clopidogrel 75 MG tablet  Commonly known as:  PLAVIX   75 mg, Oral, Daily      ferrous sulfate 325 (65 FE) MG tablet   One tablet BID      furosemide 40 MG tablet  Commonly known as:  LASIX   TAKE 1/2 TABLET BY MOUTH DAILY      labetalol 200 MG tablet  Commonly known as:  NORMODYNE   TAKE ONE TABLET BY MOUTH TWICE A DAY      lidocaine 5 %  Commonly known as:  LIDODERM   1 patch, Transdermal, Every 24 Hours, Remove & Discard patch within 12 hours or as directed by MD      pantoprazole 40 MG EC tablet  Commonly known as:  PROTONIX   No dose, route, or frequency recorded.      vitamin D3 125 MCG (5000 UT) capsule capsule   5,000 Units, Oral, Every Other Day, Takes Monday Wednesday Friday              Discharge Diet: regular    Activity at Discharge:   Activity Instructions     Work Restrictions      Type of Restriction:  Work    May Return to Work:  In 1 Week    With / Without Restrictions:  Without Restrictions          Discharge  Care Plan/Instructions: Home O2 and nebulizer machine ordered and to be delivered.      Follow-up Appointments:   Future Appointments   Date Time Provider Department Center   12/17/2019  3:00 PM Jaimie Ulloa APRN [facility]   PCP in one week  Pulmonary in 2 weeks     Test Results Pending at Discharge: none     Morris Grady MD    Time: 40 min

## 2019-12-09 ENCOUNTER — READMISSION MANAGEMENT (OUTPATIENT)
Dept: CALL CENTER | Facility: HOSPITAL | Age: 64
End: 2019-12-09

## 2019-12-09 NOTE — OUTREACH NOTE
Prep Survey      Responses   Facility patient discharged from?  [facility]   Is patient eligible?  Yes   Discharge diagnosis  Acute on chronic respiratory failure with hypoxia    Does the patient have one of the following disease processes/diagnoses(primary or secondary)?  COPD/Pneumonia   Does the patient have Home health ordered?  No   Is there a DME ordered?  Yes   What DME was ordered?  Home O2, Nebulizer- [facility]    Prep survey completed?  Yes          Alejandrina Varma RN

## 2019-12-09 NOTE — PAYOR COMM NOTE
"Sherie Gibson  [facility]  (P)[phone]  (F)[phone]    Ref#[id number]        Juanito Lora (64 y.o. Male)     Date of Birth Social Security Number Address Home Phone MRN    [id number]  [id number] [facility] [address] [phone] [id number]    [employer] Marital Status          None        Admission Date Admission Type Admitting Provider Attending Provider Department, Room/Bed    12/2/19 Emergency Jorge Garay MD  [facility] 3 [place], 326/1    Discharge Date Discharge Disposition Discharge Destination        12/8/2019 Home or Self Care              Attending Provider:  (none)   Allergies:  Penicillins    Isolation:  None   Infection:  None   Code Status:  Prior    Ht:  177.8 cm (70\")   Wt:  68.8 kg (151 lb 9.6 oz)    Admission Cmt:  None   Principal Problem:  Acute on chronic respiratory failure with hypoxia (CMS/HCC) [J96.21]                 Active Insurance as of 12/2/2019     Primary Coverage     Payor Plan Insurance Group Employer/Plan Group    [facility] [id number]     Payor Plan Address Payor Plan Phone Number Payor Plan Fax Number Effective Dates    [address] [phone]  12/1/2009 - None Entered    [place] [address]       Subscriber Name Subscriber Birth Date Member ID       JUANITO LORA 1955 [id number]                 Emergency Contacts      (Rel.) Home Phone Work Phone Mobile Phone    Carol Lora (Spouse) [phone] -- [phone]    EdmondAnshu (Son) [phone] -- [phone]               Discharge Summary      Morris Grady MD at 12/08/19 1203              [facility]  DISCHARGE SUMMARY       Date of Admission: 12/2/2019  Date of Discharge:  12/8/2019  Primary Care Physician: Preston Dalton MD    Presenting Problem/History of Present Illness:  COPD exacerbation (CMS/HCC) [J44.1]  Hypotension, unspecified " hypotension type [I95.9]  Acute on chronic congestive heart failure, unspecified heart failure type (CMS/[facility]) [I50.9]  COPD exacerbation (CMS/HCC) [J44.1]       Final Discharge Diagnoses:  Active Hospital Problems    Diagnosis   • **Acute on chronic respiratory failure with hypoxia (CMS/[facility])   • COPD exacerbation (CMS/[facility])       Consults:   Consults     No orders found from 11/3/2019 to 12/3/2019.          Procedures Performed:                 Pertinent Test Results:   Lab Results (most recent)     Procedure Component Value Units Date/Time    Basic Metabolic Panel [[id number]]  (Abnormal) Collected:  12/06/19 0516    Specimen:  Blood Updated:  12/06/19 0609     Glucose 101 mg/dL      BUN 23 mg/dL      Creatinine 0.69 mg/dL      Sodium 137 mmol/L      Potassium 3.9 mmol/L      Chloride 95 mmol/L      CO2 34.0 mmol/L      Calcium 9.3 mg/dL      eGFR Non African Amer 115 mL/min/1.73      BUN/Creatinine Ratio 33.3     Anion Gap 8.0 mmol/L     Narrative:       GFR Normal >60  Chronic Kidney Disease <60  Kidney Failure <15    CBC & Differential [[id number]] Collected:  12/06/19 0516    Specimen:  Blood Updated:  12/06/19 0549    Narrative:       The following orders were created for panel order CBC & Differential.  Procedure                               Abnormality         Status                     ---------                               -----------         ------                     CBC Auto Differential[[id number]]        Abnormal            Final result                 Please view results for these tests on the individual orders.    CBC Auto Differential [[id number]]  (Abnormal) Collected:  12/06/19 0516    Specimen:  Blood Updated:  12/06/19 0549     WBC 15.62 10*3/mm3      RBC 4.97 10*6/mm3      Hemoglobin 13.9 g/dL      Hematocrit 42.6 %      MCV 85.7 fL      MCH 28.0 pg      MCHC 32.6 g/dL      RDW 13.9 %      RDW-SD 43.3 fl      MPV 12.3 fL      Platelets 214 10*3/mm3      Neutrophil % 88.4 %      Lymphocyte % 4.4 %       Monocyte % 6.2 %      Eosinophil % 0.0 %      Basophil % 0.1 %      Immature Grans % 0.9 %      Neutrophils, Absolute 13.80 10*3/mm3      Lymphocytes, Absolute 0.69 10*3/mm3      Monocytes, Absolute 0.97 10*3/mm3      Eosinophils, Absolute 0.00 10*3/mm3      Basophils, Absolute 0.02 10*3/mm3      Immature Grans, Absolute 0.14 10*3/mm3      nRBC 0.0 /100 WBC     Influenza Antigen, Rapid - Swab, Nasopharynx [[id number]]  (Normal) Collected:  12/02/19 1946    Specimen:  Swab from Nasopharynx Updated:  12/02/19 2009     Influenza A Ag, EIA Negative     Influenza B Ag, EIA Negative    CK [[id number]]  (Normal) Collected:  12/02/19 1753    Specimen:  Blood Updated:  12/02/19 1937     Creatine Kinase 197 U/L     Magnesium [[id number]]  (Normal) Collected:  12/02/19 1753    Specimen:  Blood Updated:  12/02/19 1937     Magnesium 1.9 mg/dL     [place] Draw [[id number]] Collected:  12/02/19 1753    Specimen:  Blood Updated:  12/02/19 1900    Narrative:       The following orders were created for panel order [place] Draw.  Procedure                               Abnormality         Status                     ---------                               -----------         ------                     Light Blue Top[[id number]]                                   Final result               Green Top (Gel)[[id number]]                                  Final result               Lavender Top[[id number]]                                     Final result               Gold Top - SST[[id number]]                                   Final result                 Please view results for these tests on the individual orders.    Lavender Top [[id number]] Collected:  12/02/19 1753    Specimen:  Blood Updated:  12/02/19 1900     Extra Tube hold for add-on     Comment: Auto resulted       Light Blue Top [[id number]] Collected:  12/02/19 1753    Specimen:  Blood Updated:  12/02/19 1900     Extra Tube hold for add-on     Comment: Auto resulted       Green Top  (Gel) [[id number]] Collected:  12/02/19 1753    Specimen:  Blood Updated:  12/02/19 1900     Extra Tube Hold for add-ons.     Comment: Auto resulted.       Gold Top - SST [[id number]] Collected:  12/02/19 1753    Specimen:  Blood Updated:  12/02/19 1900     Extra Tube Hold for add-ons.     Comment: Auto resulted.       Comprehensive Metabolic Panel [[id number]]  (Abnormal) Collected:  12/02/19 1753    Specimen:  Blood Updated:  12/02/19 1821     Glucose 104 mg/dL      BUN 20 mg/dL      Creatinine 0.99 mg/dL      Sodium 133 mmol/L      Potassium 4.7 mmol/L      Chloride 94 mmol/L      CO2 29.0 mmol/L      Calcium 9.3 mg/dL      Total Protein 7.1 g/dL      Albumin 4.20 g/dL      ALT (SGPT) 13 U/L      AST (SGOT) 23 U/L      Alkaline Phosphatase 106 U/L      Total Bilirubin 0.4 mg/dL      eGFR Non African Amer 76 mL/min/1.73      Globulin 2.9 gm/dL      A/G Ratio 1.4 g/dL      BUN/Creatinine Ratio 20.2     Anion Gap 10.0 mmol/L     Narrative:       GFR Normal >60  Chronic Kidney Disease <60  Kidney Failure <15    Troponin [[id number]]  (Normal) Collected:  12/02/19 1753    Specimen:  Blood Updated:  12/02/19 1820     Troponin T <0.010 ng/mL     Narrative:       Troponin T Reference Range:  <= 0.03 ng/mL-   Negative for AMI  >0.03 ng/mL-     Abnormal for myocardial necrosis.  Clinicians would have to utilize clinical acumen, EKG, Troponin and serial changes to determine if it is an Acute Myocardial Infarction or myocardial injury due to an underlying chronic condition.     BNP [[id number]]  (Abnormal) Collected:  12/02/19 1753    Specimen:  Blood Updated:  12/02/19 1819     proBNP 3,321.0 pg/mL     Narrative:       Among patients with dyspnea, NT-proBNP is highly sensitive for the detection of acute congestive heart failure. In addition NT-proBNP of <300 pg/ml effectively rules out acute congestive heart failure with 99% negative predictive value.    CBC & Differential [[id number]] Collected:  12/02/19 1753    Specimen:   Blood Updated:  12/02/19 1801    Narrative:       The following orders were created for panel order CBC & Differential.  Procedure                               Abnormality         Status                     ---------                               -----------         ------                     CBC Auto Differential[[id number]]        Abnormal            Final result                 Please view results for these tests on the individual orders.    CBC Auto Differential [[id number]]  (Abnormal) Collected:  12/02/19 1753    Specimen:  Blood Updated:  12/02/19 1801     WBC 9.86 10*3/mm3      RBC 4.96 10*6/mm3      Hemoglobin 13.9 g/dL      Hematocrit 42.5 %      MCV 85.7 fL      MCH 28.0 pg      MCHC 32.7 g/dL      RDW 14.1 %      RDW-SD 43.8 fl      MPV 12.1 fL      Platelets 211 10*3/mm3      Neutrophil % 81.5 %      Lymphocyte % 12.0 %      Monocyte % 5.8 %      Eosinophil % 0.1 %      Basophil % 0.2 %      Immature Grans % 0.4 %      Neutrophils, Absolute 8.04 10*3/mm3      Lymphocytes, Absolute 1.18 10*3/mm3      Monocytes, Absolute 0.57 10*3/mm3      Eosinophils, Absolute 0.01 10*3/mm3      Basophils, Absolute 0.02 10*3/mm3      Immature Grans, Absolute 0.04 10*3/mm3      nRBC 0.0 /100 WBC         Imaging Results (Most Recent)     Procedure Component Value Units Date/Time    XR Chest 1 View [[id number]] Collected:  12/02/19 1723     Updated:  12/02/19 1748    Narrative:       PROCEDURE: XR CHEST 1 VW    VIEWS:Single    INDICATION: Shortness of breath    COMPARISON: CXR: 1/15/2016    FINDINGS:       -Postoperative changes typical of right pneumonectomy with  rightward shift of the heart and mediastinal contents is present,  as before. There is hyperinflation of the left lung, extending  across midline. Old right-sided rib fractures appear similar to  prior study      Impression:       Stable postoperative changes status post right pneumonectomy,  without acute abnormality identified      Electronically signed by:   "Alecia Valenzuela MD  12/2/2019 5:47 PM CST  Workstation: [id number]          Chief Complaint on Day of Discharge: none     Hospital Course:  The patient is a 64 y.o. male who [facility].  He was admitted and treated with O2 support, IV steroids, and nebulizer treatments.  He responded to treatment but remained dependent on oxygen.  His Cpap was continued at night.  On the day of dc he was feeling improved and ready for dc home.  Home O2 and nebulizer were arranged.  He is to return to work in one week.  His wife was at bedside and all questions and concerns were addressed.  Smoking cessation was encouraged.      Condition on Discharge:  Stable   Physical Exam on Discharge:  /99 (BP Location: Right arm, Patient Position: Lying)   Pulse 85   Temp 98.4 °F (36.9 °C) (Temporal)   Resp 18   Ht 177.8 cm (70\")   Wt 68.8 kg (151 lb 9.6 oz)   SpO2 94%   BMI 21.75 kg/m²    Physical Exam   Constitutional: He is oriented to person, place, and time. He appears well-developed and well-nourished.   HENT:   Head: Normocephalic and atraumatic.   Mouth/Throat: Oropharynx is clear and moist.   Eyes: Pupils are equal, round, and reactive to light. EOM are normal.   Neck: Neck supple. No tracheal deviation present.   Cardiovascular: Normal rate and regular rhythm.   No murmur heard.  Pulmonary/Chest: Effort normal. He has wheezes (basialr expiratory).   Abdominal: Soft. Bowel sounds are normal. There is no tenderness.   Musculoskeletal: He exhibits no edema or tenderness.   Lymphadenopathy:     He has no cervical adenopathy.   Neurological: He is alert and oriented to person, place, and time. No cranial nerve deficit.   Skin: Skin is warm and dry. No rash noted.         Discharge Disposition:  Home or Self Care    Discharge Medications:     Discharge Medications      New Medications      Instructions Start Date   ipratropium-albuterol 0.5-2.5 mg/3 ml nebulizer  Commonly known as:  DUO-NEB   3 " mL, Nebulization, 4 Times Daily PRN      methylPREDNISolone 4 MG tablet  Commonly known as:  MEDROL (PIO)  Notes to patient:  Take as directed   Take as directed on package instructions.         Continue These Medications      Instructions Start Date   albuterol sulfate  (90 Base) MCG/ACT inhaler  Commonly known as:  PROVENTIL HFA;VENTOLIN HFA;PROAIR HFA   2 puffs, Inhalation, Every 4 Hours PRN      amLODIPine 10 MG tablet  Commonly known as:  NORVASC   TAKE ONE TABLET BY MOUTH DAILY      aspirin 81 MG EC tablet   81 mg, Oral, Daily, Instructed to cont taking      budesonide-formoterol 80-4.5 MCG/ACT inhaler  Commonly known as:  SYMBICORT   2 puffs, Inhalation      clopidogrel 75 MG tablet  Commonly known as:  PLAVIX   75 mg, Oral, Daily      ferrous sulfate 325 (65 FE) MG tablet   One tablet BID      furosemide 40 MG tablet  Commonly known as:  LASIX   TAKE 1/2 TABLET BY MOUTH DAILY      labetalol 200 MG tablet  Commonly known as:  NORMODYNE   TAKE ONE TABLET BY MOUTH TWICE A DAY      lidocaine 5 %  Commonly known as:  LIDODERM   1 patch, Transdermal, Every 24 Hours, Remove & Discard patch within 12 hours or as directed by MD      pantoprazole 40 MG EC tablet  Commonly known as:  PROTONIX   No dose, route, or frequency recorded.      vitamin D3 125 MCG (5000 UT) capsule capsule   5,000 Units, Oral, Every Other Day, Takes Monday Wednesday Friday              Discharge Diet:     Activity at Discharge:   Activity Instructions     Work Restrictions      Type of Restriction:  Work    May Return to Work:  In 1 Week    With / Without Restrictions:  Without Restrictions          Discharge Care Plan/Instructions: Home O2 and nebulizer machine ordered and to be delivered.      Follow-up Appointments:   Future Appointments   Date Time Provider Department Center   12/17/2019  3:00 PM Jaimie Ulloa, ROSALIO VASQUEZ  [facility] None   PCP in one week  Pulmonary in 2 weeks     Test Results Pending at Discharge: none     Morris  Marlena Grady MD    Time: 40 min            Electronically signed by Morris Grady MD at 12/08/19 1208       Discharge Order (From admission, onward)     Start     Ordered    12/08/19 1113  Discharge patient  Once     Expected Discharge Date:  12/08/19    Expected Discharge Time:  Midday    Discharge Disposition:  Home or Self Care    Physician of Record for Attribution - Please select from Treatment Team:  MORRIS GRADY [[id number]]    Review needed by CMO to determine Physician of Record:  No    Please choose which facility the patient is currently admitted if they are being discharged to another facility or unit.:  [facility]    Mode:  Family       Question Answer Comment   Physician of Record for Attribution - Please select from Treatment Team MORRIS GRADY    Review needed by CMO to determine Physician of Record No    Please choose which facility the patient is currently admitted if they are being discharged to another facility or unit. [facility]    Mode: Family        12/08/19 1117

## 2019-12-11 ENCOUNTER — READMISSION MANAGEMENT (OUTPATIENT)
Dept: CALL CENTER | Facility: HOSPITAL | Age: 64
End: 2019-12-11

## 2019-12-11 NOTE — OUTREACH NOTE
COPD/PN Week 1 Survey      Responses   Facility patient discharged from?  [facility]   Does the patient have one of the following disease processes/diagnoses(primary or secondary)?  COPD/Pneumonia   Is there a successful TCM telephone encounter documented?  No   Was the primary reason for admission:  COPD exacerbation   Week 1 attempt successful?  Yes   Call start time  1034   Call end time  1042   Discharge diagnosis  Acute on chronic respiratory failure with hypoxia    Meds reviewed with patient/caregiver?  Yes   Is the patient having any side effects they believe may be caused by any medication additions or changes?  No   Does the patient have all medications ordered at discharge?  Yes   Is the patient taking all medications as directed (includes completed medication regime)?  Yes   Does the patient have a primary care provider?   Yes   Does the patient have an appointment with their PCP or pulmonologist within 7 days of discharge?  No   Nursing Interventions  Educated patient on importance of making appointment   Has the patient kept scheduled appointments due by today?  N/A   Comments  pt does not think he needs to see his PCP for a f/u appt   Has home health visited the patient within 72 hours of discharge?  N/A   What DME was ordered?  Home O2, Nebulizer- [facility] --wearing oxygen   Has all DME been delivered?  Yes   Psychosocial issues?  No   Did the patient receive a copy of their discharge instructions?  Yes   Nursing interventions  Reviewed instructions with patient   What is the patient's perception of their health status since discharge?  Improving   Nursing Interventions  Nurse provided patient education   If the patient is a current smoker, are they able to teach back resources for cessation?  -- [pt states he has stopped smoking]   Is the patient/caregiver able to teach back the hierarchy of who to call/visit for symptoms/problems? PCP, Specialist, Home health nurse, Urgent Care, ED, 911  Yes    Patient reports what zone on this call?  Green Zone   Green Zone  Reports doing well, Breathing without shortness of breath, Usual amount of phlegm/mucus without difficulty coughing up   Green Zone interventions:  Do not smoke, Take daily medications, Use oxygen as prescribed, Avoid indoor/outdoor triggers   Week 1 call completed?  Yes          Arti Mcneill RN

## 2019-12-18 ENCOUNTER — OFFICE VISIT (OUTPATIENT)
Dept: SLEEP MEDICINE | Facility: HOSPITAL | Age: 64
End: 2019-12-18

## 2019-12-18 ENCOUNTER — READMISSION MANAGEMENT (OUTPATIENT)
Dept: CALL CENTER | Facility: HOSPITAL | Age: 64
End: 2019-12-18

## 2019-12-18 VITALS
OXYGEN SATURATION: 95 % | HEIGHT: 70 IN | SYSTOLIC BLOOD PRESSURE: 137 MMHG | WEIGHT: 161 LBS | HEART RATE: 85 BPM | DIASTOLIC BLOOD PRESSURE: 70 MMHG | BODY MASS INDEX: 23.05 KG/M2

## 2019-12-18 DIAGNOSIS — G47.33 OBSTRUCTIVE SLEEP APNEA, ADULT: Primary | ICD-10-CM

## 2019-12-18 PROCEDURE — 99214 OFFICE O/P EST MOD 30 MIN: CPT | Performed by: NURSE PRACTITIONER

## 2019-12-18 NOTE — OUTREACH NOTE
COPD/PN Week 2 Survey      Responses   Facility patient discharged from?  [place]   Does the patient have one of the following disease processes/diagnoses(primary or secondary)?  COPD/Pneumonia   Week 2 attempt successful?  Yes   Call start time  1403   Call end time  1406   Meds reviewed with patient/caregiver?  Yes   Is the patient taking all medications as directed (includes completed medication regime)?  Yes   Medication comments  finished steroids   Has the patient kept scheduled appointments due by today?  Yes   Comments  oxygen at night as needed, sleep apnes   What is the patient's perception of their health status since discharge?  Improving   Is the patient able to teach back COPD zones?  Yes   Nursing interventions  Education provided on various zones   Patient reports what zone on this call?  Green Zone   Green Zone  Reports doing well, Breathing without shortness of breath, Usual activity and exercise level, Usual amount of phlegm/mucus without difficulty coughing up, Sleeping well, Appetite is good   Green Zone interventions:  Take daily medications, Continue regular exercise/diet plan, Use oxygen as prescribed, Avoid indoor/outdoor triggers   Week 2 call completed?  Yes   Revoked  No further contact(revokes)-requires comment   Graduated/Revoked comments  Back to work doing well          Shikha Simental, RN

## 2019-12-18 NOTE — PROGRESS NOTES
Sleep Clinic Follow Up    Date: 2019  Primary Care Physician: Preston Dalton MD    Last office visit: 2018 (I reviewed this note)    CC: Follow up: JUAN on CPAP      Interim History:  Since the last visit:    1) JUAN -  Juanito Pruitt has remained compliant with CPAP. He denies mask and machine issues, dry mouth, headaches, or pressures intolerance. He denies abnormal dreams, sleep paralysis, nasal congestion, URI sx. Patient's current machine is broken, he is awaiting a new machine. Patient was started on O2 at recent hospital discharge and has been using it at night only.    Sleep Testin. Unavailable   2. Currently on 12-20 cm H2O    PAP Data:    Time frame: 2018-2019   Compliance 98.3 %  Average use on days used: 9 hrs 4 min  Percent of days with usage greater than or equal to 4 hours: 97.8%  PAP range : 12-20 cm H2O  Average 90% pressure: 13.5 cmH2O  Leak: 27 minutes  Average AHI 1.7 events/hr  Mask type: Full face mask  DME: [facility]    Bed time: 3451-0151  Sleep latency: 30 minutes  Number of times awakens during the night: 4-6  Wake time: 0836-8474  Estimated total sleep time at night: 8-10 hours  Caffeine intake: 1-2 cups of coffee, 1-2 cups of tea, and 1-2 sodas per day  Alcohol intake: 0 drinks per week  Nap time: none   Sleepiness with Driving: none     [place] - 0    2) Patient denies RLS symptoms. Has some leg pain.    PMHx, FH, SH reviewed and pertinent changes are: Recently hospitalized x 6 days for URI - sent home with O2, but not wearing now.      REVIEW OF SYSTEMS:   +SOB  Negative for chest pain, fever, chills, cough, N/V/D, abdominal pain.    Smoking:none      Exam:  Vitals:    19 1502   BP: 137/70   Pulse: 85   SpO2: 95%           19  1502   Weight: 73 kg (161 lb)     Body mass index is 23.1 kg/m². Patient's Body mass index is 23.1 kg/m². BMI is within normal parameters. No follow-up required..      Gen:                No distress,  conversant, pleasant, appears stated age, alert, oriented  Eyes:               Anicteric sclera, moist conjunctiva, no lid lag                           PERRL, EOMI   Heent:             NC/AT                          Oropharynx clear                          Normal hearing  Lungs:             Normal effort, non-labored breathing                          Clear to auscultation bilaterally          CV:                  Normal S1/S2, no murmur                          No lower extremity edema  ABD:               Soft, rounded, non-distended                          Normal bowel sounds                    Psych:             Appropriate affect  Neuro:             CN 2-12 appear intact    Past Medical History:   Diagnosis Date   • Carotid artery stenosis    • COPD (chronic obstructive pulmonary disease) (CMS/[facility])    • Coronary atherosclerosis    • Hypercholesterolemia    • Hypertension    • Malignant neoplasm of right lung (CMS/[facility])    • JUAN on CPAP    • PVD (peripheral vascular disease) (CMS/[facility])    • Renal artery stenosis (CMS/[facility])    • Sleep apnea    • Squamous cell carcinoma of lung (CMS/[facility])        Current Outpatient Medications:   •  albuterol (PROVENTIL HFA;VENTOLIN HFA) 108 (90 BASE) MCG/ACT inhaler, Inhale 2 puffs every 4 (four) hours as needed for wheezing., Disp: , Rfl:   •  amLODIPine (NORVASC) 10 MG tablet, TAKE ONE TABLET BY MOUTH DAILY, Disp: 90 tablet, Rfl: 2  •  aspirin 81 MG EC tablet, Take 81 mg by mouth Daily. Instructed to cont taking, Disp: , Rfl:   •  budesonide-formoterol (SYMBICORT) 80-4.5 MCG/ACT inhaler, Inhale 2 puffs., Disp: , Rfl:   •  Cholecalciferol (VITAMIN D3) 5000 units capsule capsule, Take 5,000 Units by mouth Every Other Day. Takes Monday Wednesday Friday, Disp: , Rfl:   •  clopidogrel (PLAVIX) 75 MG tablet, Take 1 tablet by mouth Daily., Disp: 90 tablet, Rfl: 9  •  ferrous sulfate 325 (65 FE) MG tablet, One tablet BID, Disp: 60 tablet, Rfl: 3  •  furosemide (LASIX) 40 MG tablet, TAKE  1/2 TABLET BY MOUTH DAILY, Disp: 30 tablet, Rfl: 0  •  ipratropium-albuterol (DUO-NEB) 0.5-2.5 mg/3 ml nebulizer, Take 3 mL by nebulization 4 (Four) Times a Day As Needed for Wheezing., Disp: 360 mL, Rfl: 1  •  labetalol (NORMODYNE) 200 MG tablet, TAKE ONE TABLET BY MOUTH TWICE A DAY, Disp: 60 tablet, Rfl: 9  •  lidocaine (LIDODERM) 5 %, Place 1 patch on the skin Daily. Remove & Discard patch within 12 hours or as directed by MD, Disp: 30 patch, Rfl: 0  •  methylPREDNISolone (MEDROL, PIO,) 4 MG tablet, Take as directed on package instructions., Disp: 1 each, Rfl: 0  •  pantoprazole (PROTONIX) 40 MG EC tablet, , Disp: , Rfl:       Assessment and Plan:    1. Obstructive sleep apnea Established, stable (1)  1. Compliant with PAP therapy  2. Continue PAP as prescribed  3. Script for PAP supplies  4. Return to clinic in 1 year with compliance report unless change in symptoms in interim period  2. COPD - Chronic, stable  1. Nocturnal O2        10 of 18 minutes spent face-to-face counseling patient extensively regarding:   PAP therapy, PAP compliance and PAP maintenance      RTC in 12 months. Patient agrees to return sooner if changes in symptoms.        This document has been electronically signed by ROSALIO Kincaid on December 18, 2019 3:13 PM          CC: Preston Dalton MD          No ref. provider found

## 2019-12-26 ENCOUNTER — OFFICE VISIT (OUTPATIENT)
Dept: PULMONOLOGY | Facility: CLINIC | Age: 64
End: 2019-12-26

## 2019-12-26 ENCOUNTER — HOSPITAL ENCOUNTER (OUTPATIENT)
Dept: GENERAL RADIOLOGY | Facility: HOSPITAL | Age: 64
Discharge: HOME | End: 2019-12-26
Admitting: INTERNAL MEDICINE

## 2019-12-26 VITALS
BODY MASS INDEX: 22.96 KG/M2 | WEIGHT: 160.4 LBS | DIASTOLIC BLOOD PRESSURE: 58 MMHG | SYSTOLIC BLOOD PRESSURE: 110 MMHG | OXYGEN SATURATION: 93 % | HEIGHT: 70 IN | HEART RATE: 87 BPM

## 2019-12-26 DIAGNOSIS — J96.21 ACUTE ON CHRONIC RESPIRATORY FAILURE WITH HYPOXIA (HCC): Primary | Chronic | ICD-10-CM

## 2019-12-26 DIAGNOSIS — J44.9 CHRONIC OBSTRUCTIVE PULMONARY DISEASE, UNSPECIFIED COPD TYPE (HCC): ICD-10-CM

## 2019-12-26 DIAGNOSIS — C34.00 MALIGNANT NEOPLASM OF HILUS OF LUNG, UNSPECIFIED LATERALITY (HCC): ICD-10-CM

## 2019-12-26 DIAGNOSIS — J44.9 CHRONIC OBSTRUCTIVE PULMONARY DISEASE, UNSPECIFIED COPD TYPE (HCC): Primary | ICD-10-CM

## 2019-12-26 DIAGNOSIS — J42 CHRONIC BRONCHITIS, UNSPECIFIED CHRONIC BRONCHITIS TYPE (HCC): ICD-10-CM

## 2019-12-26 PROCEDURE — 71046 X-RAY EXAM CHEST 2 VIEWS: CPT

## 2019-12-26 PROCEDURE — 99214 OFFICE O/P EST MOD 30 MIN: CPT | Performed by: INTERNAL MEDICINE

## 2019-12-26 RX ORDER — AZITHROMYCIN 250 MG/1
TABLET, FILM COATED ORAL
Qty: 6 TABLET | Refills: 1 | Status: SHIPPED | OUTPATIENT
Start: 2019-12-26 | End: 2020-12-11

## 2019-12-26 NOTE — PROGRESS NOTES
Juanito Pruitt is a 64 y.o. male.     Chief Complaint   Patient presents with   • COPD       History of Present Illness   This 64-year-old gentleman was in the hospital recently for bronchitis and hypoxemic respiratory failure.  His breathing has improved since.  He had a right pneumonectomy for cancer 3-1/2 years ago.  He does have dyspnea on exertion but continues to work full-time he has a history also of high blood pressure and heart disease and has had open heart surgery 10 years ago.  Medications please see his list.  Medication allergies penicillin.  Family history positive for cancer and heart disease.  Social history non-smoker nondrinker  The following portions of the patient's history were reviewed and updated as appropriate: allergies, current medications, past family history, past medical history, past social history, past surgical history and problem list.    Review of Systems   Constitutional: Negative for activity change, chills, fatigue, fever and unexpected weight change.   HENT: Negative for congestion, dental problem, ear discharge, ear pain, facial swelling, hearing loss, nosebleeds, postnasal drip, rhinorrhea, sinus pressure, sneezing, sore throat, tinnitus, trouble swallowing and voice change.    Eyes: Negative.  Negative for photophobia, pain, discharge, redness, itching and visual disturbance.   Respiratory: Positive for cough, shortness of breath and wheezing. Negative for chest tightness.    Cardiovascular: Negative for chest pain, palpitations and leg swelling.   Gastrointestinal: Negative for abdominal distention, abdominal pain and vomiting.   Endocrine: Negative.  Negative for cold intolerance, heat intolerance, polydipsia and polyphagia.   Genitourinary: Negative.  Negative for decreased urine volume, dysuria, enuresis, flank pain, frequency, hematuria and urgency.   Musculoskeletal: Negative for arthralgias, back pain, gait problem, joint swelling, myalgias and neck pain.  "  Skin: Negative for color change, pallor, rash and wound.   Allergic/Immunologic: Negative.  Negative for environmental allergies, food allergies and immunocompromised state.   Neurological: Negative.  Negative for dizziness, tremors, seizures, syncope, facial asymmetry, speech difficulty, weakness, light-headedness, numbness and headaches.   Hematological: Negative for adenopathy. Does not bruise/bleed easily.   Psychiatric/Behavioral: Negative for agitation, behavioral problems, confusion, decreased concentration, hallucinations and self-injury. The patient is not hyperactive.    All other systems reviewed and are negative.      /58 (BP Location: Left arm, Patient Position: Sitting, Cuff Size: Adult)   Pulse 87   Ht 177.8 cm (70\")   Wt 72.8 kg (160 lb 6.4 oz)   SpO2 93%   BMI 23.02 kg/m²   Physical Exam   Constitutional: He appears well-developed and well-nourished. No distress.   HENT:   Head: Normocephalic.   Mouth/Throat: No oropharyngeal exudate.   Eyes: Pupils are equal, round, and reactive to light. Conjunctivae are normal. Right eye exhibits no discharge. Left eye exhibits no discharge. No scleral icterus.   Neck: Normal range of motion. Neck supple. No JVD present. No tracheal deviation present. No thyromegaly present.   Cardiovascular: Normal rate, regular rhythm, normal heart sounds and intact distal pulses. Exam reveals no gallop and no friction rub.   No murmur heard.  Pulmonary/Chest: Breath sounds normal. He is in respiratory distress ( Absent breath sounds right lung). He has no wheezes. He has no rales. He exhibits no tenderness.   Abdominal: Bowel sounds are normal. He exhibits no distension and no mass. There is no tenderness. There is no guarding.   Musculoskeletal: He exhibits no tenderness or deformity.   Lymphadenopathy:     He has no cervical adenopathy.   Neurological: He is alert. He has normal reflexes. He displays normal reflexes. No cranial nerve deficit. He exhibits normal " muscle tone. Coordination normal.   Skin: Skin is warm and dry. No rash noted. He is not diaphoretic. No pallor.   Psychiatric: He has a normal mood and affect. His behavior is normal. Judgment and thought content normal.   Nursing note and vitals reviewed.  Chest x-ray post right pneumonectomy, no infiltrates      Assessment/Plan   Juanito was seen today for copd.    Diagnoses and all orders for this visit:    Acute on chronic respiratory failure with hypoxia (CMS/HCC)    Chronic bronchitis, unspecified chronic bronchitis type (CMS/HCC)    Malignant neoplasm of hilus of lung, unspecified laterality (CMS/HCC)    Other orders  -     azithromycin (ZITHROMAX) 250 MG tablet; Take 2 tablets the first day, then 1 tablet daily for 4 days.      Assessment post pneumonectomy from lung cancer, recent bronchitis    Plan Zithromax for lung infection, nebulizer treatments as needed, return in 4 months        This document has been produced with the assistance of [employer] dictation  This document has been electronically signed by Luther Douglas MD on December 26, 2019 1:16 PM

## 2020-02-03 DIAGNOSIS — I73.9 PVD (PERIPHERAL VASCULAR DISEASE) (HCC): ICD-10-CM

## 2020-02-03 RX ORDER — FUROSEMIDE 40 MG/1
20 TABLET ORAL DAILY
Qty: 30 TABLET | Refills: 4 | Status: SHIPPED | OUTPATIENT
Start: 2020-02-03 | End: 2020-12-11 | Stop reason: SDUPTHER

## 2020-03-02 RX ORDER — AMLODIPINE BESYLATE 10 MG/1
10 TABLET ORAL DAILY
Qty: 90 TABLET | Refills: 2 | Status: SHIPPED | OUTPATIENT
Start: 2020-03-02 | End: 2020-12-10

## 2020-03-20 RX ORDER — AZITHROMYCIN 250 MG/1
TABLET, FILM COATED ORAL
Qty: 6 TABLET | Refills: 0 | OUTPATIENT
Start: 2020-03-20

## 2020-05-28 ENCOUNTER — OFFICE VISIT (OUTPATIENT)
Dept: PULMONOLOGY | Facility: CLINIC | Age: 65
End: 2020-05-28

## 2020-05-28 VITALS
SYSTOLIC BLOOD PRESSURE: 122 MMHG | HEIGHT: 70 IN | HEART RATE: 82 BPM | BODY MASS INDEX: 24.39 KG/M2 | WEIGHT: 170.4 LBS | DIASTOLIC BLOOD PRESSURE: 64 MMHG | OXYGEN SATURATION: 93 %

## 2020-05-28 DIAGNOSIS — J42 CHRONIC BRONCHITIS, UNSPECIFIED CHRONIC BRONCHITIS TYPE (HCC): Primary | ICD-10-CM

## 2020-05-28 PROCEDURE — 99213 OFFICE O/P EST LOW 20 MIN: CPT | Performed by: INTERNAL MEDICINE

## 2020-05-28 RX ORDER — ALBUTEROL SULFATE 2.5 MG/3ML
2.5 SOLUTION RESPIRATORY (INHALATION) EVERY 6 HOURS PRN
Qty: 90 VIAL | Refills: 5 | Status: SHIPPED | OUTPATIENT
Start: 2020-05-28 | End: 2021-01-01

## 2020-05-28 NOTE — PROGRESS NOTES
"This gentleman has COPD, status post right pneumonectomy for lung cancer.  His breathing remains the same on present medications.  He is back to work and not coughing purulent sputum    ROS    Constitutional-no night sweats weight loss headaches  GI no abdominal pain nausea or diarrhea  Neuro no seizure or neurologic deficits  Musculoskeletal no deformity or joint pain   no dysuria or hematuria  Skin no rash or other lesions  All other systems reviewed and were negative except for the above.      Physical Exam  /64   Pulse 82   Ht 177.8 cm (70\")   Wt 77.3 kg (170 lb 6.4 oz)   SpO2 93%   BMI 24.45 kg/m²   Vital signs as above  Pupils equally round and reactive to light and accommodation, neck no JVD or adenopathy.  Cardiovascular regular rhythm and rate no murmur or gallop.  Abdomen soft no organomegaly tenderness.  Extremities no clubbing cyanosis or edema.  No cervical adenopathy.  No skin rash.  Neurologic good strength bilaterally without deficits  Alert afebrile lungs clear left absent breath sounds on the right    Impression COPD and post pneumonectomy for lung cancer    Plan continue present medications, return in 6 months        This document has been produced with the assistance of Dragon dictation  This document has been electronically signed by Luther Douglas MD on May 28, 2020 09:17      "

## 2020-06-04 ENCOUNTER — HOSPITAL ENCOUNTER (OUTPATIENT)
Dept: CT IMAGING | Facility: HOSPITAL | Age: 65
Discharge: HOME | End: 2020-06-04
Admitting: THORACIC SURGERY (CARDIOTHORACIC VASCULAR SURGERY)

## 2020-06-04 DIAGNOSIS — C34.81 MALIGNANT NEOPLASM OF OVERLAPPING SITES OF RIGHT LUNG (HCC): ICD-10-CM

## 2020-06-04 PROCEDURE — 71250 CT THORAX DX C-: CPT

## 2020-07-09 ENCOUNTER — OFFICE VISIT (OUTPATIENT)
Dept: CARDIAC SURGERY | Facility: CLINIC | Age: 65
End: 2020-07-09

## 2020-07-09 VITALS
WEIGHT: 169.8 LBS | HEIGHT: 70 IN | BODY MASS INDEX: 24.31 KG/M2 | DIASTOLIC BLOOD PRESSURE: 82 MMHG | OXYGEN SATURATION: 94 % | HEART RATE: 83 BPM | SYSTOLIC BLOOD PRESSURE: 145 MMHG

## 2020-07-09 DIAGNOSIS — I73.9 PVD (PERIPHERAL VASCULAR DISEASE) (HCC): ICD-10-CM

## 2020-07-09 DIAGNOSIS — I10 ESSENTIAL HYPERTENSION: ICD-10-CM

## 2020-07-09 DIAGNOSIS — I25.10 CORONARY ARTERY DISEASE INVOLVING NATIVE CORONARY ARTERY OF NATIVE HEART WITHOUT ANGINA PECTORIS: ICD-10-CM

## 2020-07-09 DIAGNOSIS — I70.25 ATHEROSCLEROSIS OF NATIVE ARTERIES OF THE EXTREMITIES WITH ULCERATION (HCC): ICD-10-CM

## 2020-07-09 DIAGNOSIS — C34.81 MALIGNANT NEOPLASM OF OVERLAPPING SITES OF RIGHT LUNG (HCC): ICD-10-CM

## 2020-07-09 DIAGNOSIS — I65.23 BILATERAL CAROTID ARTERY STENOSIS: Primary | ICD-10-CM

## 2020-07-09 DIAGNOSIS — E78.00 HYPERCHOLESTEROLEMIA: ICD-10-CM

## 2020-07-09 DIAGNOSIS — I10 BENIGN HYPERTENSION: ICD-10-CM

## 2020-07-09 DIAGNOSIS — I70.1 RENAL ARTERY STENOSIS (HCC): ICD-10-CM

## 2020-07-09 DIAGNOSIS — J42 CHRONIC BRONCHITIS, UNSPECIFIED CHRONIC BRONCHITIS TYPE (HCC): ICD-10-CM

## 2020-07-09 PROCEDURE — 99214 OFFICE O/P EST MOD 30 MIN: CPT | Performed by: THORACIC SURGERY (CARDIOTHORACIC VASCULAR SURGERY)

## 2020-07-21 DIAGNOSIS — I73.9 PVD (PERIPHERAL VASCULAR DISEASE) (HCC): ICD-10-CM

## 2020-07-21 RX ORDER — CLOPIDOGREL BISULFATE 75 MG/1
TABLET ORAL
Qty: 90 TABLET | Refills: 6 | Status: SHIPPED | OUTPATIENT
Start: 2020-07-21 | End: 2021-01-01

## 2020-08-07 NOTE — PROGRESS NOTES
7/9/2020    Juanito Pruitt  1955    Chief Complaint:    Chief Complaint   Patient presents with   • Carotid Artery Disease       HPI:      PCP:  Preston Dalton MD   Cardiology:  Dr Thompson  GI:  Dr Moya  Hematology:  Dr Bustamante  63yr man with PVD, Carotid Stenosis, CAD, HTN, dyslipidemia, GERD. RIGHT groin pain resolved.  progressive anemia, workup in progress. Carotid stenosis requires treatment.  No hemoptysis, cough, wt loss. Claudication None, No rest pain, No ischemic tissue loss  No amaurosis fugax, No TIA, No stroke  No other associated signs, symptoms or modifying factors.     12/2011:  bilateral renal artery stents  4/2012 JUAN:  RIGHT 1.1 triphasic, LEFT 1.1 triphasic  6/2012 Exercise JUAN:  RIGHT .89 to .69, LEFT 1.0 to .87  4/2014 US RIGHT groin:  [id number] fluid collection deep to femoral vessels.  5/2014 US drainage seroma:  10ml serous fluid, no residual seroma.  5/2014 US RIGHT groin:  5x2x0.5cm fluid collection deep to femoral vessels.  7/2014 Renal Artery Duplex:  RIGHT maxPSV 222cm/s,RI .87, RAR 2.8.  LEFT maxPSV 271cm/s, RI .88, RAR 3.4.  patent bilateral renal artery stents.     9/2012 Carotid Duplex:  ARIC 70-75% antegrade.  LICA <50% antegrade (maxPSV 96cm/s).  9/2012 CTA Carotids:  ARIC 60% small ulcerative plaque.  LICA mild irregularity.  10/2013 CTA Carotids:  ARIC 85%, LICA mild irregularity  12/2013 RIGHT CEA   1/2014 Carotid Duplex:  ARIC 0-15%  7/2014 Carotid Duplex:  ARIC 0-15% antegrade.  LICA 50-79% antegrade (maxPSV 145cm/s).  1/2015 Carotid Duplex:  ARIC 0-15% antegrade.  LICA 50-79% antegrade (maxPSV 147cm/s).  2/2016  Carotid Duplex:  ARIC 0-15% antegrade.  LICA 50-79% antegrade ([id number] cm/s, ratio 2.5) .   2/2016 CTA Carotids:  ARIC no significant disease, LICA 75-80%.  3/2017  LEFT CEA          5/2017  Carotid Duplex:  LICA 0-45% antegrade        11/2017  Carotid Duplex:  ARIC 0-49% maxPSV 91 ratio 1.3 antegrade.  LICA 0-49% % antegrade [id number] cm/s,  ratio 1.4         2018  Carotid Duplex:  ARIC 0-49% maxPSV 81 ratio 1.0  LICA 0-49% % antegrade [id number] cm/s, ratio 1.6  Vertebrals antegrade        2018 Carotid Duplex:  ARIC 0-49% (74/26cm/s, ratio 1.4)  LICA 0-49%  antegrade 104/34cm/s, ratio 0.9)  Vertebrals antegrade  2020 Carotid Duplex:  ARIC 0-49% (75/25cm/s, ratio 0.7)  LICA 0-49% (112/33cm/s, ratio 1.4)  Vertebrals antegrade    2011 CXR:  LEFT lower lobe atelectasis.  2016 CXR:  RIGHT paratracheal mass, medial segment atelectasis.  2016 PET CT:  RIGHT upper lobe post obstructive atelectasis.  mass obstructing upper lobe bronchus SUV 10.  no significant adenopathy.  liver, adrenals ok.  52675 PFT:  FVC 4.2 (95%), FEV1 1.7 (47%), DLCO 48%  2016 AB.42/41/57/26/90%  2016 Quantitative lung perfusion scan:  RIGHT 31%, LEFT 69% (predicted post pneumonectomy FEV1 1100ml, adequate for resection)  2016  Right thoracotomy Pneumonectomy, PATH:  FINAL  RIGHT LUNG: SQUAMOUS CARCINOMA, MODERATELY DIFFERENTIATED OF UPPER LOBE BRONCHUS (4.5 CM).THE NEOPLASM IS 0.5 CM AWAY FROM THE BRONCHIAL MARGIN SUBPLEURAL ABSCESS DISTAL TO THE TUMOR. NO EVIDENCE OF ANGIOLYMPHATIC INVOLVEMENT.  All lymph nodes negative.  2016  Chest xray:  expected postoperative changes following right pneumonectomy. Left lung appears clear. There is a displaced right lateral sixth rib fracture it appears to be acute.   2016  Chest xray:  Old healed left rib fractures noted. Displaced right sixth rib fracture unchanged in appearance. CONCLUSION- No significant change in near complete opacification of the right hemithorax. Left lung remains clear.   2016  Chest xray:  consistent with postpneumonectomy changes. There is again noted a displaced right lateral sixth rib fracture. The patient is status post median sternotomy. Emphysema is noted in the left lung. The left lung is otherwise clear. CONCLUSION- No acute disease.   2017 CT Chest:  No new nodules.  Possible RIGHT  paratracheal lymph node vs postop change.  Liver adrenal ok.  5/2018 CT Chest:  No new nodules,5mm GGO LEFT lower lobe.  .  no change RIGHT paratracheal lymph node.  Liver adrenals ok.  12/2018 CT Chest:  No new nodules, 5mm GGO LEFT lower lobe.  no change RIGHT paratracheal lymph node.  Liver adrenals ok.  6/2019 CT Chest:  No new nodules, 5mm GGO LEFT lower lobe.  no change RIGHT paratracheal lymph node.  Liver adrenals ok.  7/2020 CT Chest:  No new nodules, 8mm GGO LEFT central upper lobe.  no change 20mm RIGHT paratracheal lymph node.  Liver adrenals ok.      The following portions of the patient's history were reviewed and updated as appropriate: allergies, current medications, past family history, past medical history, past social history, past surgical history and problem list.  Recent images independently reviewed.  Available laboratory values reviewed.    PMH:  Past Medical History:   Diagnosis Date   • Carotid artery stenosis    • COPD (chronic obstructive pulmonary disease) (CMS/HCC)    • Coronary atherosclerosis    • Hypercholesterolemia    • Hypertension    • Malignant neoplasm of right lung (CMS/HCC)    • JUAN on CPAP    • PVD (peripheral vascular disease) (CMS/HCC)    • Renal artery stenosis (CMS/HCC)    • Sleep apnea    • Squamous cell carcinoma of lung (CMS/HCC)      Past Surgical History:   Procedure Laterality Date   • CARDIAC CATHETERIZATION     • CAROTID ENDARTERECTOMY     • CORONARY ARTERY BYPASS GRAFT     • ENDOSCOPY N/A 8/29/2019   • ESOPHAGOSCOPY / EGD     • HIP ARTHROPLASTY     • LUNG REMOVAL, TOTAL     • LA THROMBOENDARTECTMY NECK,NECK INCIS Left 3/14/2017   • THORACOTOMY     • TRANSESOPHAGEAL ECHOCARDIOGRAM (LEIDA)       Family History   Problem Relation Age of Onset   • Heart disease Mother    • Hypertension Father    • Cancer Sister      Social History     Tobacco Use   • Smoking status: Former Smoker     Years: 35.00   • Smokeless tobacco: Never Used   Substance Use Topics   • Alcohol  use: No   • Drug use: No       ALLERGIES:  Allergies   Allergen Reactions   • Penicillins Itching     Rash,itching         MEDICATIONS:    Current Outpatient Medications:   •  albuterol (PROVENTIL HFA;VENTOLIN HFA) 108 (90 BASE) MCG/ACT inhaler, Inhale 2 puffs every 4 (four) hours as needed for wheezing., Disp: , Rfl:   •  albuterol (PROVENTIL) (2.5 MG/3ML) 0.083% nebulizer solution, Take 2.5 mg by nebulization Every 6 (Six) Hours As Needed for Wheezing., Disp: 90 vial, Rfl: 5  •  amLODIPine (NORVASC) 10 MG tablet, Take 1 tablet by mouth Daily., Disp: 90 tablet, Rfl: 2  •  aspirin 81 MG EC tablet, Take 81 mg by mouth Daily. Instructed to cont taking, Disp: , Rfl:   •  azithromycin (ZITHROMAX) 250 MG tablet, Take 2 tablets the first day, then 1 tablet daily for 4 days., Disp: 6 tablet, Rfl: 1  •  budesonide-formoterol (SYMBICORT) 80-4.5 MCG/ACT inhaler, Inhale 2 puffs., Disp: , Rfl:   •  Cholecalciferol (VITAMIN D3) 5000 units capsule capsule, Take 5,000 Units by mouth Every Other Day. Takes Monday Wednesday Friday, Disp: , Rfl:   •  clopidogrel (PLAVIX) 75 MG tablet, TAKE 1 TABLET EVERY DAY, Disp: 90 tablet, Rfl: 6  •  ferrous sulfate 325 (65 FE) MG tablet, One tablet BID, Disp: 60 tablet, Rfl: 3  •  furosemide (LASIX) 40 MG tablet, Take 0.5 tablets by mouth Daily., Disp: 30 tablet, Rfl: 4  •  ipratropium-albuterol (DUO-NEB) 0.5-2.5 mg/3 ml nebulizer, Take 3 mL by nebulization 4 (Four) Times a Day As Needed for Wheezing., Disp: 360 mL, Rfl: 1  •  labetalol (NORMODYNE) 200 MG tablet, TAKE ONE TABLET BY MOUTH TWICE A DAY, Disp: 60 tablet, Rfl: 9  •  lidocaine (LIDODERM) 5 %, Place 1 patch on the skin Daily. Remove & Discard patch within 12 hours or as directed by MD, Disp: 30 patch, Rfl: 0  •  methylPREDNISolone (MEDROL, PIO,) 4 MG tablet, Take as directed on package instructions., Disp: 1 each, Rfl: 0  •  pantoprazole (PROTONIX) 40 MG EC tablet, , Disp: , Rfl:     Review of Systems   Review of Systems   Constitution:  "Positive for malaise/fatigue. Negative for weight loss.   Cardiovascular: Positive for dyspnea on exertion. Negative for chest pain and claudication.   Respiratory: Negative for cough and shortness of breath.    Skin: Negative for color change and poor wound healing.   Musculoskeletal: Positive for back pain.   Neurological: Negative for dizziness, numbness and weakness.       Physical Exam   Vitals:    07/09/20 1518   BP: 145/82   BP Location: Left arm   Patient Position: Sitting   Pulse: 83   SpO2: 94%   Weight: 77 kg (169 lb 12.8 oz)   Height: 177.8 cm (70\")     Physical Exam   Constitutional: He is oriented to person, place, and time. He is active and cooperative. He does not appear ill. No distress.   HENT:   Right Ear: Hearing normal.   Left Ear: Hearing normal.   Nose: No nasal deformity. No epistaxis.   Mouth/Throat: He does not have dentures. Normal dentition.   Cardiovascular: Normal rate and regular rhythm.   No murmur heard.  Pulses:       Carotid pulses are 2+ on the right side, and 2+ on the left side.       Radial pulses are 2+ on the right side, and 2+ on the left side.        Posterior tibial pulses are 2+ on the right side, and 2+ on the left side.   Pulmonary/Chest: Effort normal and breath sounds normal.   Abdominal: Soft. He exhibits no distension and no mass. There is no tenderness.   Musculoskeletal: He exhibits no deformity.   Gait normal.    Neurological: He is alert and oriented to person, place, and time. He has normal strength.   Skin: Skin is warm and dry. No cyanosis or erythema. No pallor.   No venous staining   Psychiatric: He has a normal mood and affect. His speech is normal. Judgment and thought content normal.       BUN   Date Value Ref Range Status   12/06/2019 23 8 - 23 mg/dL Final     Creatinine   Date Value Ref Range Status   12/06/2019 0.69 (L) 0.76 - 1.27 mg/dL Final     eGFR Non  Amer   Date Value Ref Range Status   12/06/2019 115 >60 mL/min/1.73 Final "       ASSESSMENT:  Juanito was seen today for carotid artery disease.    Diagnoses and all orders for this visit:    Bilateral carotid artery stenosis  -     Duplex Carotid Ultrasound CAR; Future    Atherosclerosis of native arteries of the extremities with ulceration (CMS/HCC)    Benign hypertension    Coronary artery disease involving native coronary artery of native heart without angina pectoris    Essential hypertension    Hypercholesterolemia    PVD (peripheral vascular disease) (CMS/HCC)    Renal artery stenosis (CMS/HCC)    Chronic bronchitis, unspecified chronic bronchitis type (CMS/HCC)    Malignant neoplasm of overlapping sites of right lung (CMS/HCC)  -     CT Chest Without Contrast; Future    PLAN:  Detailed discussion with Juanito Owen Pruitt regarding situation and options.  Stable.  Multiple risk factors with severe comorbidities.  No intervention indicated at this time.  Will follow with interval imaging.  Risks, benefits discussed.  Understands and wishes to proceed with plan.    Return in 1 year with Carotid Duplex, CT Chest without contrast    Return after above studies complete  Recommended regular physical activity, progressive walking program.  Continue current medications as directed.    Thank you for the opportunity to participate in this patient's care.    Copy to primary care provider.    EMR Dragon/Transcription disclaimer:   Much of this encounter note is an electronic transcription/translation of spoken language to printed text. The electronic translation of spoken language may permit erroneous, or at times, nonsensical words or phrases to be inadvertently transcribed; Although I have reviewed the note for such errors, some may still exist.

## 2020-08-26 RX ORDER — LABETALOL 200 MG/1
200 TABLET, FILM COATED ORAL 2 TIMES DAILY
Qty: 60 TABLET | Refills: 5 | Status: SHIPPED | OUTPATIENT
Start: 2020-08-26 | End: 2021-01-01

## 2020-12-08 RX ORDER — FUROSEMIDE 40 MG/1
TABLET ORAL
Qty: 135 TABLET | Refills: 0 | OUTPATIENT
Start: 2020-12-08

## 2020-12-10 RX ORDER — AMLODIPINE BESYLATE 10 MG/1
TABLET ORAL
Qty: 90 TABLET | Refills: 1 | Status: SHIPPED | OUTPATIENT
Start: 2020-12-10 | End: 2020-12-11 | Stop reason: SDUPTHER

## 2020-12-10 RX ORDER — FUROSEMIDE 40 MG/1
TABLET ORAL
Qty: 135 TABLET | Refills: 0 | OUTPATIENT
Start: 2020-12-10

## 2020-12-11 ENCOUNTER — OFFICE VISIT (OUTPATIENT)
Dept: CARDIOLOGY | Facility: CLINIC | Age: 65
End: 2020-12-11

## 2020-12-11 VITALS — WEIGHT: 169 LBS | HEIGHT: 70 IN | BODY MASS INDEX: 24.2 KG/M2

## 2020-12-11 DIAGNOSIS — I25.10 CORONARY ARTERY DISEASE INVOLVING NATIVE CORONARY ARTERY OF NATIVE HEART WITHOUT ANGINA PECTORIS: Primary | ICD-10-CM

## 2020-12-11 DIAGNOSIS — I73.9 PVD (PERIPHERAL VASCULAR DISEASE) (HCC): ICD-10-CM

## 2020-12-11 DIAGNOSIS — E78.2 MIXED HYPERLIPIDEMIA: ICD-10-CM

## 2020-12-11 DIAGNOSIS — I10 ESSENTIAL HYPERTENSION: ICD-10-CM

## 2020-12-11 DIAGNOSIS — Z95.1 HX OF CABG: ICD-10-CM

## 2020-12-11 PROCEDURE — 99442 PR PHYS/QHP TELEPHONE EVALUATION 11-20 MIN: CPT | Performed by: INTERNAL MEDICINE

## 2020-12-11 RX ORDER — AMLODIPINE BESYLATE 10 MG/1
10 TABLET ORAL DAILY
Qty: 90 TABLET | Refills: 3 | Status: SHIPPED | OUTPATIENT
Start: 2020-12-11

## 2020-12-11 RX ORDER — FUROSEMIDE 40 MG/1
20 TABLET ORAL DAILY
Qty: 30 TABLET | Refills: 4 | Status: SHIPPED | OUTPATIENT
Start: 2020-12-11 | End: 2021-01-01

## 2020-12-11 NOTE — PROGRESS NOTES
Juanito Pruitt  65 y.o. male    1. Coronary artery disease involving native coronary artery of native heart without angina pectoris    2. PVD (peripheral vascular disease) (CMS/HCC)    3. Essential hypertension    4. Mixed hyperlipidemia    5. Hx of CABG        History of Present Illness:  This visit has been rescheduled as a phone visit to comply with patient safety concerns in accordance with [facility] recommendations. Total time of discussion was 18 minutes.    You have chosen to receive care through a telephone visit. Do you consent to use a telephone visit for your medical care today? Yes    Mr. Pruitt is being assessed for the above-stated problems.  He has done very well from a cardiac standpoint and denied any chest pain, shortness of breath, palpitation.  He has been compliant with his medications.  He has documented extensive peripheral vascular disease and has been followed by Dr. Ray on a regular basis. He has history of bilateral renal artery stents in December 2011, right carotid endarterectomy in 2013, Left carotid endarterectomy in 2017.  His bypass surgery was in 2011.  In 2016 he underwent right thoracotomy and pneumonectomy for squamous cell carcinoma right lung.    He has done quite well and denied any chest pain or shortness of breath.  He has been compliant with his medications.  He indicates that his blood pressure is usually in the 125-135 systolic range at home.  He did not check his blood pressure today.  He has followed up with vascular surgery on a regular basis.           PMH:  Medical History        Past Medical History:   Diagnosis Date   • Allergic rhinitis     • Atherosclerosis of native arteries of the extremities with ulceration (CMS/HCC)       Bilateral legs   • Benign hypertension     • Carotid artery stenosis     • Coagulopathy (CMS/HCC)       on plavix and asa (instructed to continue per md)   • Coronary atherosclerosis     • Essential hypertension     • Hypercholesterolemia      • Inguinal pain       small right groin seroma   • Malignant neoplasm of lung (CMS/HCC)       Right upper lobe mass suspicious for lung cancer   • JUAN on CPAP     • PVD (peripheral vascular disease) (CMS/HCC)     • Renal artery stenosis (CMS/HCC)     • Simple chronic bronchitis (CMS/HCC)     • Sleep apnea     • SOB (shortness of breath)     • Squamous cell carcinoma of lung (CMS/HCC)           Surgical History           SUBJECTIVE    Allergies   Allergen Reactions   • Penicillins Itching     Rash,itching         Past Medical History:   Diagnosis Date   • Carotid artery stenosis    • COPD (chronic obstructive pulmonary disease) (CMS/HCC)    • Coronary atherosclerosis    • Hypercholesterolemia    • Hypertension    • Malignant neoplasm of right lung (CMS/HCC)    • JUAN on CPAP    • PVD (peripheral vascular disease) (CMS/HCC)    • Renal artery stenosis (CMS/HCC)    • Sleep apnea    • Squamous cell carcinoma of lung (CMS/HCC)          Past Surgical History:   Procedure Laterality Date   • CARDIAC CATHETERIZATION     • CAROTID ENDARTERECTOMY     • CORONARY ARTERY BYPASS GRAFT     • ENDOSCOPY N/A 8/29/2019   • ESOPHAGOSCOPY / EGD     • HIP ARTHROPLASTY     • LUNG REMOVAL, TOTAL     • CO THROMBOENDARTECTMY NECK,NECK INCIS Left 3/14/2017   • THORACOTOMY     • TRANSESOPHAGEAL ECHOCARDIOGRAM (LEIDA)           Family History   Problem Relation Age of Onset   • Heart disease Mother    • Hypertension Father    • Cancer Sister          Social History     Socioeconomic History   • Marital status:      Spouse name: Not on file   • Number of children: Not on file   • Years of education: Not on file   • Highest education level: Not on file   Tobacco Use   • Smoking status: Former Smoker     Years: 35.00   • Smokeless tobacco: Never Used   Substance and Sexual Activity   • Alcohol use: No   • Drug use: No   • Sexual activity: Not Currently         Current Outpatient Medications   Medication Sig Dispense Refill   • albuterol  "(PROVENTIL HFA;VENTOLIN HFA) 108 (90 BASE) MCG/ACT inhaler Inhale 2 puffs every 4 (four) hours as needed for wheezing.     • albuterol (PROVENTIL) (2.5 MG/3ML) 0.083% nebulizer solution Take 2.5 mg by nebulization Every 6 (Six) Hours As Needed for Wheezing. 90 vial 5   • amLODIPine (NORVASC) 10 MG tablet Take 1 tablet by mouth Daily. 90 tablet 3   • aspirin 81 MG EC tablet Take 81 mg by mouth Daily. Instructed to cont taking     • budesonide-formoterol (SYMBICORT) 80-4.5 MCG/ACT inhaler Inhale 2 puffs.     • Cholecalciferol (VITAMIN D3) 5000 units capsule capsule Take 5,000 Units by mouth Every Other Day. Takes Monday Wednesday Friday     • clopidogrel (PLAVIX) 75 MG tablet TAKE 1 TABLET EVERY DAY 90 tablet 6   • furosemide (LASIX) 40 MG tablet Take 0.5 tablets by mouth Daily. 30 tablet 4   • ipratropium-albuterol (DUO-NEB) 0.5-2.5 mg/3 ml nebulizer Take 3 mL by nebulization 4 (Four) Times a Day As Needed for Wheezing. 360 mL 1   • labetalol (NORMODYNE) 200 MG tablet Take 1 tablet by mouth 2 (Two) Times a Day. 60 tablet 5   • pantoprazole (PROTONIX) 40 MG EC tablet        No current facility-administered medications for this visit.          OBJECTIVE    Ht 177.8 cm (70\")   Wt 76.7 kg (169 lb)   BMI 24.25 kg/m²         Review of Systems     Constitutional:  Denies recent weight loss, weight gain, fever or chills     HENT:  Denies any hearing loss, epistaxis, hoarseness, or difficulty speaking.     Eyes: Wears eyeglasses or contact lenses     Respiratory:  Denies dyspnea with exertion,no cough, wheezing, or hemoptysis.     Cardiovascular: Negative for palpations, chest pain, orthopnea, PND    Gastrointestinal:  Denies change in bowel habits, dyspepsia, ulcer disease, hematochezia, or melena.     Endocrine: Negative for cold intolerance, heat intolerance, polydipsia, polyphagia and polyuria.    Genitourinary: Negative.      Musculoskeletal: DJD    Skin:  Denies any change in hair or nails, rashes, or skin lesions.  "     Neurological:  Denies any history of recurrent headaches, strokes, TIA, or seizure disorder.     Hematological: Denies any food allergies, seasonal allergies, bleeding disorders, or lymphadenopathy.     Psychiatric/Behavioral: Denies any history of depression, substance abuse, or change in cognitive function.         Lab Results   Component Value Date    WBC 15.62 (H) 12/06/2019    HGB 13.9 12/06/2019    HCT 42.6 12/06/2019    MCV 85.7 12/06/2019     12/06/2019     Lab Results   Component Value Date    GLUCOSE 101 (H) 12/06/2019    BUN 23 12/06/2019    CREATININE 0.69 (L) 12/06/2019    EGFRIFNONA 115 12/06/2019    BCR 33.3 (H) 12/06/2019    CO2 34.0 (H) 12/06/2019    CALCIUM 9.3 12/06/2019    ALBUMIN 4.20 12/02/2019    AST 23 12/02/2019    ALT 13 12/02/2019     Lab Results   Component Value Date    CHOL 129 01/31/2019     Lab Results   Component Value Date    TRIG 136 01/31/2019     Lab Results   Component Value Date    HDL 40 (L) 01/31/2019     No components found for: LDLCALC  Lab Results   Component Value Date    LDL 70 01/31/2019     No results found for: HDLLDLRATIO  No components found for: CHOLHDL  No results found for: HGBA1C  No results found for: TSH, J2HVSNH, N1KRPQW, THYROIDAB        ASSESSMENT AND PLAN  Mr. Pruitt has multiple medical issues but is clinically stable from a cardiac standpoint with no evidence of angina, arrhythmia or congestive heart failure.  I have continued antiplatelet therapy with aspirin and Plavix, antihypertensive therapy with labetalol, amlodipine and diuretic therapy has been continued.  Prescriptions were refilled.    Diagnoses and all orders for this visit:    1. Coronary artery disease involving native coronary artery of native heart without angina pectoris (Primary)  -     Lipid Panel; Future  -     Comprehensive Metabolic Panel  -     CBC & Differential    2. PVD (peripheral vascular disease) (CMS/HCC)  -     Lipid Panel; Future  -     Comprehensive Metabolic  Panel  -     CBC & Differential    3. Essential hypertension  -     Lipid Panel; Future  -     Comprehensive Metabolic Panel  -     CBC & Differential    4. Mixed hyperlipidemia  -     Lipid Panel; Future  -     Comprehensive Metabolic Panel  -     CBC & Differential    5. Hx of CABG  -     Lipid Panel; Future  -     Comprehensive Metabolic Panel  -     CBC & Differential    Other orders  -     furosemide (LASIX) 40 MG tablet; Take 0.5 tablets by mouth Daily.  Dispense: 30 tablet; Refill: 4  -     amLODIPine (NORVASC) 10 MG tablet; Take 1 tablet by mouth Daily.  Dispense: 90 tablet; Refill: 3        Patient's Body mass index is 24.25 kg/m². BMI is within normal parameters. No follow-up required..  He is a non-smoker    Eber Thompson MD  12/11/2020  08:12 CST

## 2020-12-14 ENCOUNTER — OFFICE VISIT (OUTPATIENT)
Dept: SLEEP MEDICINE | Facility: HOSPITAL | Age: 65
End: 2020-12-14

## 2020-12-14 DIAGNOSIS — G47.33 OBSTRUCTIVE SLEEP APNEA, ADULT: Primary | ICD-10-CM

## 2020-12-14 PROCEDURE — 99441 PR PHYS/QHP TELEPHONE EVALUATION 5-10 MIN: CPT | Performed by: NURSE PRACTITIONER

## 2020-12-14 NOTE — PROGRESS NOTES
Sleep Clinic Follow Up - Telephone Visit      You have chosen to receive care through a telephone visit. Do you consent to use a telephone visit for your medical care today? Yes    Date: 2020  Primary Care Provider: Preston Dalton MD    Last office visit: 2019 (I reviewed this note)    CC: Follow up: JUAN on CPAP      Interim History:  Since the last visit:    1) JUAN -  Juanito Pruitt has remained compliant with CPAP. He denies mask and machine issues, dry mouth, headaches, or pressures intolerance. He denies abnormal dreams, sleep paralysis, nasal congestion, URI sx.    Sleep Testin. Unavailable  2. Currently on 12-20 cm H2O    PAP Data:    Time frame: 2019-12/10/2020   Compliance: 86.5 %  Average use on days used: 8 hrs 55 min  Percent of days with usage greater than or equal to 4 hours: 86.2%  PAP range : 12-20 cm H2O  Average 90% pressure: 14.1 cmH2O  Leak: 28 minutes  Average AHI: 1.4 events/hr  Mask type: Full face mask  DME: [facility]    Bed time: 5118-6601  Sleep latency: 20-30 minutes  Number of times awakens during the night: 2-5  Wake time: 5931-7568  Estimated total sleep time at night: 8-10 hours  Caffeine intake: 1-2 cups of coffee, 1-2 cups of tea, and 1-2 sodas per day  Alcohol intake: 0 drinks per week  Nap time: none   Sleepiness with Driving: none       2) Patient denies RLS symptoms.     PMHx, FH, SH reviewed and pertinent changes are: unchanged from last office visit on 2019      REVIEW OF SYSTEMS:   Negative for chest pain, SOA, fever, chills, cough, N/V/D, abdominal pain.    Smoking:none        Exam:  Unable to perform physical exam due to conducting telephone visit    Past Medical History:   Diagnosis Date   • Carotid artery stenosis    • COPD (chronic obstructive pulmonary disease) (CMS/HCC)    • Coronary atherosclerosis    • Hypercholesterolemia    • Hypertension    • Malignant neoplasm of right lung (CMS/HCC)    • JUAN on CPAP    • PVD  (peripheral vascular disease) (CMS/HCC)    • Renal artery stenosis (CMS/HCC)    • Sleep apnea    • Squamous cell carcinoma of lung (CMS/[facility])        Current Outpatient Medications:   •  albuterol (PROVENTIL HFA;VENTOLIN HFA) 108 (90 BASE) MCG/ACT inhaler, Inhale 2 puffs every 4 (four) hours as needed for wheezing., Disp: , Rfl:   •  albuterol (PROVENTIL) (2.5 MG/3ML) 0.083% nebulizer solution, Take 2.5 mg by nebulization Every 6 (Six) Hours As Needed for Wheezing., Disp: 90 vial, Rfl: 5  •  amLODIPine (NORVASC) 10 MG tablet, Take 1 tablet by mouth Daily., Disp: 90 tablet, Rfl: 3  •  aspirin 81 MG EC tablet, Take 81 mg by mouth Daily. Instructed to cont taking, Disp: , Rfl:   •  budesonide-formoterol (SYMBICORT) 80-4.5 MCG/ACT inhaler, Inhale 2 puffs., Disp: , Rfl:   •  Cholecalciferol (VITAMIN D3) 5000 units capsule capsule, Take 5,000 Units by mouth Every Other Day. Takes Monday Wednesday Friday, Disp: , Rfl:   •  clopidogrel (PLAVIX) 75 MG tablet, TAKE 1 TABLET EVERY DAY, Disp: 90 tablet, Rfl: 6  •  furosemide (LASIX) 40 MG tablet, Take 0.5 tablets by mouth Daily., Disp: 30 tablet, Rfl: 4  •  ipratropium-albuterol (DUO-NEB) 0.5-2.5 mg/3 ml nebulizer, Take 3 mL by nebulization 4 (Four) Times a Day As Needed for Wheezing., Disp: 360 mL, Rfl: 1  •  labetalol (NORMODYNE) 200 MG tablet, Take 1 tablet by mouth 2 (Two) Times a Day., Disp: 60 tablet, Rfl: 5  •  pantoprazole (PROTONIX) 40 MG EC tablet, , Disp: , Rfl:       Assessment and Plan:    1. Obstructive sleep apnea - Established, stable (1)  1. Compliant with PAP therapy  2. Continue PAP as prescribed  3. Script for PAP supplies  4. Return to clinic in 1 year with compliance report unless change in symptoms in interim period  2. COPD - Established, stable (1)  3. History of lung cancer S/P pneumonectomy - Established, stable (1)      This visit has been rescheduled as a phone visit to comply with patient safety concerns in accordance with CDC recommendations. Total  time of discussion was 5 minutes.    4 of 5 minutes spent telephone counseling patient extensively regarding:   PAP therapy, PAP compliance and PAP maintenance      RTC in 12 months. Patient agrees to return sooner if changes in symptoms.        This document has been electronically signed by ROSALIO Kincaid on December 14, 2020 15:36 CST          CC: Preston Dalton MD          No ref. provider found

## 2021-01-01 ENCOUNTER — APPOINTMENT (OUTPATIENT)
Dept: CT IMAGING | Facility: HOSPITAL | Age: 66
End: 2021-01-01

## 2021-01-01 ENCOUNTER — DOCUMENTATION (OUTPATIENT)
Dept: CARDIOLOGY | Facility: CLINIC | Age: 66
End: 2021-01-01

## 2021-01-01 ENCOUNTER — OFFICE VISIT (OUTPATIENT)
Dept: CARDIOLOGY | Facility: CLINIC | Age: 66
End: 2021-01-01

## 2021-01-01 ENCOUNTER — TELEPHONE (OUTPATIENT)
Dept: CARDIOLOGY | Facility: CLINIC | Age: 66
End: 2021-01-01

## 2021-01-01 ENCOUNTER — TRANSCRIBE ORDERS (OUTPATIENT)
Dept: PULMONOLOGY | Facility: HOSPITAL | Age: 66
End: 2021-01-01

## 2021-01-01 ENCOUNTER — OFFICE VISIT (OUTPATIENT)
Dept: PULMONOLOGY | Facility: CLINIC | Age: 66
End: 2021-01-01

## 2021-01-01 ENCOUNTER — HOSPITAL ENCOUNTER (OUTPATIENT)
Dept: CT IMAGING | Facility: HOSPITAL | Age: 66
Discharge: HOME | End: 2021-07-06
Admitting: THORACIC SURGERY (CARDIOTHORACIC VASCULAR SURGERY)

## 2021-01-01 ENCOUNTER — IMMUNIZATION (OUTPATIENT)
Dept: VACCINE CLINIC | Facility: HOSPITAL | Age: 66
End: 2021-01-01

## 2021-01-01 ENCOUNTER — TELEPHONE (OUTPATIENT)
Dept: GENERAL RADIOLOGY | Facility: HOSPITAL | Age: 66
End: 2021-01-01

## 2021-01-01 ENCOUNTER — OFFICE VISIT (OUTPATIENT)
Dept: CARDIAC SURGERY | Facility: CLINIC | Age: 66
End: 2021-01-01

## 2021-01-01 ENCOUNTER — DOCUMENTATION (OUTPATIENT)
Dept: PULMONOLOGY | Facility: CLINIC | Age: 66
End: 2021-01-01

## 2021-01-01 ENCOUNTER — HOSPITAL ENCOUNTER (OUTPATIENT)
Dept: PULMONOLOGY | Facility: HOSPITAL | Age: 66
Discharge: HOME | End: 2021-06-11
Admitting: INTERNAL MEDICINE

## 2021-01-01 ENCOUNTER — OFFICE VISIT (OUTPATIENT)
Dept: SLEEP MEDICINE | Facility: HOSPITAL | Age: 66
End: 2021-01-01

## 2021-01-01 VITALS
HEIGHT: 70 IN | OXYGEN SATURATION: 92 % | SYSTOLIC BLOOD PRESSURE: 110 MMHG | BODY MASS INDEX: 20.62 KG/M2 | WEIGHT: 144 LBS | DIASTOLIC BLOOD PRESSURE: 68 MMHG | HEART RATE: 68 BPM

## 2021-01-01 VITALS
SYSTOLIC BLOOD PRESSURE: 132 MMHG | BODY MASS INDEX: 21.85 KG/M2 | HEART RATE: 60 BPM | OXYGEN SATURATION: 94 % | DIASTOLIC BLOOD PRESSURE: 68 MMHG | HEIGHT: 70 IN | TEMPERATURE: 98.4 F | WEIGHT: 152.6 LBS

## 2021-01-01 VITALS
SYSTOLIC BLOOD PRESSURE: 116 MMHG | HEIGHT: 70 IN | HEART RATE: 54 BPM | DIASTOLIC BLOOD PRESSURE: 54 MMHG | OXYGEN SATURATION: 91 % | WEIGHT: 146 LBS | TEMPERATURE: 97.7 F | BODY MASS INDEX: 20.9 KG/M2

## 2021-01-01 VITALS
WEIGHT: 156.4 LBS | HEART RATE: 66 BPM | TEMPERATURE: 97.1 F | HEIGHT: 70 IN | BODY MASS INDEX: 22.39 KG/M2 | DIASTOLIC BLOOD PRESSURE: 74 MMHG | SYSTOLIC BLOOD PRESSURE: 150 MMHG | OXYGEN SATURATION: 75 %

## 2021-01-01 DIAGNOSIS — I25.10 CORONARY ARTERY DISEASE INVOLVING NATIVE CORONARY ARTERY OF NATIVE HEART WITHOUT ANGINA PECTORIS: Primary | ICD-10-CM

## 2021-01-01 DIAGNOSIS — C34.91 SQUAMOUS CELL CARCINOMA OF RIGHT LUNG (HCC): Primary | ICD-10-CM

## 2021-01-01 DIAGNOSIS — R06.09 DYSPNEA ON EXERTION: ICD-10-CM

## 2021-01-01 DIAGNOSIS — G47.33 OSA AND COPD OVERLAP SYNDROME (HCC): ICD-10-CM

## 2021-01-01 DIAGNOSIS — Z98.890 HISTORY OF PNEUMONECTOMY: ICD-10-CM

## 2021-01-01 DIAGNOSIS — I73.9 PVD (PERIPHERAL VASCULAR DISEASE) (HCC): ICD-10-CM

## 2021-01-01 DIAGNOSIS — J44.9 OSA AND COPD OVERLAP SYNDROME (HCC): ICD-10-CM

## 2021-01-01 DIAGNOSIS — J44.9 CHRONIC OBSTRUCTIVE PULMONARY DISEASE, UNSPECIFIED COPD TYPE (HCC): ICD-10-CM

## 2021-01-01 DIAGNOSIS — C34.81 MALIGNANT NEOPLASM OF OVERLAPPING SITES OF RIGHT LUNG (HCC): ICD-10-CM

## 2021-01-01 DIAGNOSIS — J44.1 COPD WITH EXACERBATION (HCC): ICD-10-CM

## 2021-01-01 DIAGNOSIS — J44.9 OBSTRUCTIVE CHRONIC BRONCHITIS WITHOUT EXACERBATION (HCC): ICD-10-CM

## 2021-01-01 DIAGNOSIS — J96.21 ACUTE ON CHRONIC RESPIRATORY FAILURE WITH HYPOXIA (HCC): ICD-10-CM

## 2021-01-01 DIAGNOSIS — J42 CHRONIC BRONCHITIS, UNSPECIFIED CHRONIC BRONCHITIS TYPE (HCC): ICD-10-CM

## 2021-01-01 DIAGNOSIS — G47.33 OBSTRUCTIVE SLEEP APNEA, ADULT: Primary | ICD-10-CM

## 2021-01-01 DIAGNOSIS — G47.33 OSA ON CPAP: ICD-10-CM

## 2021-01-01 DIAGNOSIS — Z99.89 OSA ON CPAP: ICD-10-CM

## 2021-01-01 DIAGNOSIS — C34.91 MALIGNANT NEOPLASM OF RIGHT LUNG, UNSPECIFIED PART OF LUNG (HCC): ICD-10-CM

## 2021-01-01 DIAGNOSIS — J96.21 ACUTE AND CHRONIC RESPIRATORY FAILURE WITH HYPOXIA (HCC): Primary | ICD-10-CM

## 2021-01-01 DIAGNOSIS — Z98.890 H/O PNEUMONECTOMY: ICD-10-CM

## 2021-01-01 DIAGNOSIS — C34.00 MALIGNANT NEOPLASM OF HILUS OF LUNG, UNSPECIFIED LATERALITY (HCC): ICD-10-CM

## 2021-01-01 DIAGNOSIS — I25.10 CORONARY ARTERY DISEASE INVOLVING NATIVE CORONARY ARTERY OF NATIVE HEART WITHOUT ANGINA PECTORIS: ICD-10-CM

## 2021-01-01 DIAGNOSIS — I10 BENIGN HYPERTENSION: ICD-10-CM

## 2021-01-01 DIAGNOSIS — E78.2 MIXED HYPERLIPIDEMIA: ICD-10-CM

## 2021-01-01 DIAGNOSIS — Z90.2 HISTORY OF PNEUMONECTOMY: ICD-10-CM

## 2021-01-01 DIAGNOSIS — Z95.1 HX OF CABG: ICD-10-CM

## 2021-01-01 DIAGNOSIS — I70.25 ATHEROSCLEROSIS OF NATIVE ARTERIES OF THE EXTREMITIES WITH ULCERATION (HCC): ICD-10-CM

## 2021-01-01 DIAGNOSIS — I65.23 BILATERAL CAROTID ARTERY STENOSIS: ICD-10-CM

## 2021-01-01 DIAGNOSIS — Z90.2 H/O PNEUMONECTOMY: ICD-10-CM

## 2021-01-01 DIAGNOSIS — Z95.1 HX OF CABG: Primary | ICD-10-CM

## 2021-01-01 DIAGNOSIS — I10 ESSENTIAL HYPERTENSION: ICD-10-CM

## 2021-01-01 DIAGNOSIS — J44.9 COPD, SEVERE (HCC): Primary | ICD-10-CM

## 2021-01-01 DIAGNOSIS — E78.00 HYPERCHOLESTEROLEMIA: ICD-10-CM

## 2021-01-01 DIAGNOSIS — J44.9 OBSTRUCTIVE CHRONIC BRONCHITIS WITHOUT EXACERBATION (HCC): Primary | ICD-10-CM

## 2021-01-01 DIAGNOSIS — I70.1 RENAL ARTERY STENOSIS (HCC): ICD-10-CM

## 2021-01-01 DIAGNOSIS — G47.30 SLEEP APNEA, UNSPECIFIED TYPE: ICD-10-CM

## 2021-01-01 LAB
QT INTERVAL: 434 MS
QTC INTERVAL: 454 MS

## 2021-01-01 PROCEDURE — 99214 OFFICE O/P EST MOD 30 MIN: CPT | Performed by: INTERNAL MEDICINE

## 2021-01-01 PROCEDURE — 99214 OFFICE O/P EST MOD 30 MIN: CPT | Performed by: THORACIC SURGERY (CARDIOTHORACIC VASCULAR SURGERY)

## 2021-01-01 PROCEDURE — 91300 HC SARSCOV02 VAC 30MCG/0.3ML IM: CPT | Performed by: THORACIC SURGERY (CARDIOTHORACIC VASCULAR SURGERY)

## 2021-01-01 PROCEDURE — 94727 GAS DIL/WSHOT DETER LNG VOL: CPT | Performed by: INTERNAL MEDICINE

## 2021-01-01 PROCEDURE — 94729 DIFFUSING CAPACITY: CPT

## 2021-01-01 PROCEDURE — 94729 DIFFUSING CAPACITY: CPT | Performed by: INTERNAL MEDICINE

## 2021-01-01 PROCEDURE — 94727 GAS DIL/WSHOT DETER LNG VOL: CPT

## 2021-01-01 PROCEDURE — 0001A: CPT | Performed by: THORACIC SURGERY (CARDIOTHORACIC VASCULAR SURGERY)

## 2021-01-01 PROCEDURE — 94060 EVALUATION OF WHEEZING: CPT | Performed by: INTERNAL MEDICINE

## 2021-01-01 PROCEDURE — 99212 OFFICE O/P EST SF 10 MIN: CPT | Performed by: NURSE PRACTITIONER

## 2021-01-01 PROCEDURE — 94060 EVALUATION OF WHEEZING: CPT

## 2021-01-01 PROCEDURE — 0002A: CPT | Performed by: THORACIC SURGERY (CARDIOTHORACIC VASCULAR SURGERY)

## 2021-01-01 PROCEDURE — 93000 ELECTROCARDIOGRAM COMPLETE: CPT | Performed by: INTERNAL MEDICINE

## 2021-01-01 PROCEDURE — 71250 CT THORAX DX C-: CPT

## 2021-01-01 RX ORDER — ALBUTEROL SULFATE 90 UG/1
2 AEROSOL, METERED RESPIRATORY (INHALATION) EVERY 4 HOURS PRN
Qty: 18 G | Refills: 11 | Status: SHIPPED | OUTPATIENT
Start: 2021-01-01 | End: 2022-01-01 | Stop reason: SDUPTHER

## 2021-01-01 RX ORDER — IBUPROFEN 800 MG/1
TABLET ORAL
COMMUNITY
Start: 2021-01-01

## 2021-01-01 RX ORDER — DUTASTERIDE 0.5 MG/1
0.5 CAPSULE, LIQUID FILLED ORAL DAILY
COMMUNITY
Start: 2021-01-01 | End: 2021-01-01

## 2021-01-01 RX ORDER — IPRATROPIUM BROMIDE AND ALBUTEROL SULFATE 2.5; .5 MG/3ML; MG/3ML
3 SOLUTION RESPIRATORY (INHALATION) EVERY 4 HOURS PRN
Qty: 360 ML | Refills: 11 | Status: SHIPPED | OUTPATIENT
Start: 2021-01-01

## 2021-01-01 RX ORDER — CLOPIDOGREL BISULFATE 75 MG/1
TABLET ORAL
Qty: 90 TABLET | Refills: 5 | Status: SHIPPED | OUTPATIENT
Start: 2021-01-01

## 2021-01-01 RX ORDER — FERROUS SULFATE 325(65) MG
325 TABLET ORAL
COMMUNITY
Start: 2021-01-01

## 2021-01-01 RX ORDER — CYCLOBENZAPRINE HCL 5 MG
5-10 TABLET ORAL NIGHTLY PRN
COMMUNITY
Start: 2021-01-01

## 2021-01-01 RX ORDER — FUROSEMIDE 40 MG/1
TABLET ORAL
Qty: 30 TABLET | Refills: 3 | Status: SHIPPED | OUTPATIENT
Start: 2021-01-01

## 2021-01-01 RX ORDER — PREDNISONE 10 MG/1
10 TABLET ORAL DAILY
Qty: 30 TABLET | Refills: 2 | Status: SHIPPED | OUTPATIENT
Start: 2021-01-01

## 2021-01-01 RX ORDER — MELOXICAM 7.5 MG/1
TABLET ORAL
COMMUNITY
Start: 2021-01-01 | End: 2021-01-01

## 2021-01-01 RX ORDER — LABETALOL 200 MG/1
TABLET, FILM COATED ORAL
Qty: 180 TABLET | Refills: 3 | Status: SHIPPED | OUTPATIENT
Start: 2021-01-01

## 2021-01-01 RX ORDER — CLINDAMYCIN HYDROCHLORIDE 300 MG/1
CAPSULE ORAL
COMMUNITY
Start: 2021-01-01 | End: 2021-01-01

## 2021-01-01 RX ORDER — TAMSULOSIN HYDROCHLORIDE 0.4 MG/1
CAPSULE ORAL
COMMUNITY
Start: 2021-01-01

## 2021-01-01 RX ORDER — REVEFENACIN 175 UG/3ML
SOLUTION RESPIRATORY (INHALATION)
COMMUNITY
Start: 2021-01-01 | End: 2021-01-01

## 2021-01-01 RX ORDER — LABETALOL 200 MG/1
TABLET, FILM COATED ORAL
Qty: 60 TABLET | Refills: 4 | Status: SHIPPED | OUTPATIENT
Start: 2021-01-01 | End: 2021-01-01

## 2021-04-01 NOTE — TELEPHONE ENCOUNTER
Called to remind patient to remind him of the lab work that Dr. Kenny ordered on him 12/11/20, he was understanding

## 2021-06-22 NOTE — PROGRESS NOTES
Juanito Pruitt  65 y.o. male    1. Hx of CABG    2. Benign hypertension    3. Hypercholesterolemia    4. PVD (peripheral vascular disease) (CMS/HCC)    5. Coronary artery disease involving native coronary artery of native heart without angina pectoris        History of Present Illness:  Mr. Pruitt is being assessed for the above-stated problems. He has documented extensive peripheral vascular disease and has been followed by Dr. Ray on a regular basis. He has history of bilateral renal artery stents in December 2011, right carotid endarterectomy in 2013, Left carotid endarterectomy in 2017.  His bypass surgery was in 2011.  In 2016 he underwent right thoracotomy and pneumonectomy for squamous cell carcinoma right lung.  He has history of COPD.    The patient denied any chest pain but does have dyspnea on exertion which he believes has been worse during the past 1 month.  He denies any fevers or chills and did not have Covid infection.  He has been compliant with his medications and has an appointment coming up with pulmonology.    Echocardiogram in December 2019 showed normal LV systolic function with an ejection fraction of 55% with grade 1A diastolic dysfunction.  No significant valve abnormalities were noted.        EKG today showed sinus rhythm with heart rate of 66 bpm.  Right bundle branch block, right atrial enlargement, inferior wall infarct age undetermined, anterolateral infarct, age undetermined.    Clinical exam today showed no signs of congestive heart failure.  No bronchospasm was appreciated.       PMH:  Medical History        Past Medical History:   Diagnosis Date   • Allergic rhinitis     • Atherosclerosis of native arteries of the extremities with ulceration (CMS/HCC)       Bilateral legs   • Benign hypertension     • Carotid artery stenosis     • Coagulopathy (CMS/HCC)       on plavix and asa (instructed to continue per md)   • Coronary atherosclerosis     • Essential hypertension     •  Hypercholesterolemia     • Inguinal pain       small right groin seroma   • Malignant neoplasm of lung (CMS/HCC)       Right upper lobe mass suspicious for lung cancer   • UJAN on CPAP     • PVD (peripheral vascular disease) (CMS/HCC)     • Renal artery stenosis (CMS/HCC)     • Simple chronic bronchitis (CMS/HCC)     • Sleep apnea     • SOB (shortness of breath)     • Squamous cell carcinoma of lung (CMS/HCC)           Surgical History           SUBJECTIVE    Allergies   Allergen Reactions   • Penicillins Itching     Rash,itching         Past Medical History:   Diagnosis Date   • Carotid artery stenosis    • COPD (chronic obstructive pulmonary disease) (CMS/HCC)    • Coronary atherosclerosis    • Hypercholesterolemia    • Hypertension    • Malignant neoplasm of right lung (CMS/HCC)    • JUAN on CPAP    • PVD (peripheral vascular disease) (CMS/HCC)    • Renal artery stenosis (CMS/HCC)    • Sleep apnea    • Squamous cell carcinoma of lung (CMS/HCC)          Past Surgical History:   Procedure Laterality Date   • CARDIAC CATHETERIZATION     • CAROTID ENDARTERECTOMY     • CORONARY ARTERY BYPASS GRAFT     • ENDOSCOPY N/A 8/29/2019   • ESOPHAGOSCOPY / EGD     • HIP ARTHROPLASTY     • LUNG REMOVAL, TOTAL     • UT THROMBOENDARTECTMY NECK,NECK INCIS Left 3/14/2017   • THORACOTOMY     • TRANSESOPHAGEAL ECHOCARDIOGRAM (LEIDA)           Family History   Problem Relation Age of Onset   • Heart disease Mother    • Hypertension Father    • Cancer Sister          Social History     Socioeconomic History   • Marital status:      Spouse name: Not on file   • Number of children: Not on file   • Years of education: Not on file   • Highest education level: Not on file   Tobacco Use   • Smoking status: Former Smoker     Years: 35.00   • Smokeless tobacco: Never Used   Substance and Sexual Activity   • Alcohol use: No   • Drug use: No   • Sexual activity: Not Currently         Current Outpatient Medications   Medication Sig Dispense  "Refill   • albuterol (PROVENTIL HFA;VENTOLIN HFA) 108 (90 BASE) MCG/ACT inhaler Inhale 2 puffs every 4 (four) hours as needed for wheezing.     • albuterol (PROVENTIL) (2.5 MG/3ML) 0.083% nebulizer solution Take 2.5 mg by nebulization Every 6 (Six) Hours As Needed for Wheezing. 90 vial 5   • amLODIPine (NORVASC) 10 MG tablet Take 1 tablet by mouth Daily. 90 tablet 3   • aspirin 81 MG EC tablet Take 81 mg by mouth Daily. Instructed to cont taking     • budesonide-formoterol (SYMBICORT) 80-4.5 MCG/ACT inhaler Inhale 2 puffs.     • Cholecalciferol (VITAMIN D3) 5000 units capsule capsule Take 5,000 Units by mouth Every Other Day. Takes Monday Wednesday Friday     • clindamycin (CLEOCIN) 300 MG capsule      • clopidogrel (PLAVIX) 75 MG tablet TAKE 1 TABLET EVERY DAY 90 tablet 6   • ferrous sulfate 325 (65 FE) MG tablet Take 325 mg by mouth.     • furosemide (LASIX) 40 MG tablet Take 0.5 tablets by mouth Daily. 30 tablet 4   • ipratropium-albuterol (DUO-NEB) 0.5-2.5 mg/3 ml nebulizer Take 3 mL by nebulization 4 (Four) Times a Day As Needed for Wheezing. 360 mL 1   • labetalol (NORMODYNE) 200 MG tablet TAKE 1 TABLET TWICE DAILY 60 tablet 4   • pantoprazole (PROTONIX) 40 MG EC tablet      • tamsulosin (FLOMAX) 0.4 MG capsule 24 hr capsule        No current facility-administered medications for this visit.         OBJECTIVE    /74 (BP Location: Left arm, Patient Position: Sitting, Cuff Size: Adult)   Pulse 66   Temp 97.1 °F (36.2 °C)   Ht 177.8 cm (70\")   Wt 70.9 kg (156 lb 6.4 oz)   SpO2 (!) 75%   BMI 22.44 kg/m²       Review of Systems : The following systems are reviewed and changes noted as indicated below.    Constitutional:  Denies recent weight loss, weight gain, fever or chills     HENT:  Denies any hearing loss, epistaxis, hoarseness, or difficulty speaking.     Eyes: Wears eyeglasses or contact lenses     Respiratory:  Dyspnea with exertion,no cough, wheezing, or hemoptysis.     Cardiovascular: Negative " for palpations, chest pain, orthopnea, PND    Gastrointestinal:  Denies change in bowel habits, dyspepsia, ulcer disease, hematochezia, or melena.     Endocrine: Negative for cold intolerance, heat intolerance, polydipsia, polyphagia and polyuria.    Genitourinary: Negative.      Musculoskeletal: DJD    Skin:  Denies any change in hair or nails, rashes, or skin lesions.      Neurological:  Denies any history of recurrent headaches, strokes, TIA, or seizure disorder.     Hematological: Denies any food allergies, seasonal allergies, bleeding disorders, or lymphadenopathy.     Psychiatric/Behavioral: Denies any history of depression, substance abuse, or change in cognitive function.        Physical Exam : The following systems are reviewed and no changes noted     Constitutional: Cooperative, alert and oriented,  in no acute distress.      HENT:   Head: Normocephalic, normal hair patterns, no masses or tenderness.  Ears, Nose, and Throat: No gross abnormalities. No pallor or cyanosis.  Eyes: EOMS intact, PERRL, conjunctivae and lids unremarkable. Fundoscopic exam and visual fields not performed.   Neck: No palpable masses or adenopathy, no thyromegaly, no JVD, carotid pulses are full and equal bilaterally and without  Bruits.      Cardiovascular: Regular rhythm, S1 and S2 normal, no S3 or S4. No murmurs, gallops, or rubs detected.      Pulmonary/Chest: Chest: normal symmetry,  normal respiratory excursion, no intercostal retraction, no use of accessory muscles.                                  Pulmonary: Normal breath sounds. No rales or ronchi.     Abdominal: Abdomen soft, bowel sounds normoactive, no masses, no hepatosplenomegaly, non-tender, no bruits.     Musculoskeletal: No deformities, clubbing, cyanosis, erythema, or edema observed.     Neurological: No gross motor or sensory deficits noted, affect appropriate, oriented to time, person, place.      Skin: Warm and dry to the touch, no apparent skin lesions or  masses noted.     Psychiatric: He has a normal mood and affect. His behavior is normal. Judgment and thought content normal.        Lab Results   Component Value Date    WBC 15.62 (H) 12/06/2019    HGB 13.9 12/06/2019    HCT 42.6 12/06/2019    MCV 85.7 12/06/2019     12/06/2019     Lab Results   Component Value Date    GLUCOSE 101 (H) 12/06/2019    BUN 23 12/06/2019    CREATININE 0.69 (L) 12/06/2019    EGFRIFNONA 115 12/06/2019    BCR 33.3 (H) 12/06/2019    CO2 34.0 (H) 12/06/2019    CALCIUM 9.3 12/06/2019    ALBUMIN 4.20 12/02/2019    AST 23 12/02/2019    ALT 13 12/02/2019     Lab Results   Component Value Date    CHOL 131 05/26/2021    CHOL 129 01/31/2019     Lab Results   Component Value Date    TRIG 72 05/26/2021    TRIG 136 01/31/2019     Lab Results   Component Value Date    HDL 53 05/26/2021    HDL 40 (L) 01/31/2019     No components found for: LDLCALC  Lab Results   Component Value Date    LDL 54 05/26/2021    LDL 70 01/31/2019     No results found for: HDLLDLRATIO  No components found for: CHOLHDL  No results found for: HGBA1C  No results found for: TSH, Z2XFRES, F8MMKTB, THYROIDAB        ASSESSMENT AND PLAN  Mr. Pruitt has multiple medical issues as discussed under history of present illness and does report worsening dyspnea.  I suspect that this is due to his pulmonary status but make sure that his LV systolic function is normal and echocardiogram is being arranged.    His labs from May 2021 were reviewed and LDL was 54 and HDL 53.  He was anemic with a hemoglobin of 8.9.  This could be contributing to his dyspnea.  He has CT scan of the chest and vascular studies planned prior to his next visit with Dr. Ray.    After reviewing his medications I have continued antihypertensive therapy with labetalol, amlodipine, antiplatelet therapy with aspirin Plavix and Lasix has been continued.  If his anemia does not improve with iron supplements, GI/ hematology consultation will be  appropriate.    Diagnoses and all orders for this visit:    1. Hx of CABG (Primary)    2. Benign hypertension    3. Hypercholesterolemia    4. PVD (peripheral vascular disease) (CMS/HCC)    5. Coronary artery disease involving native coronary artery of native heart without angina pectoris  -     ECG 12 Lead        Patient's Body mass index is 22.44 kg/m². BMI is within normal parameters. No follow-up required..  He is a non-smoker    Eber Thompson MD  6/22/2021  11:00 CDT

## 2021-09-02 NOTE — PROGRESS NOTES
Eber Thompson MD Brown, Karen M, RN  Echo; no significant change from previous study             Patient notified and he had no concerns

## 2021-11-24 NOTE — PROGRESS NOTES
9/7/2021    Juanito Pruitt  1955    Chief Complaint:    Chief Complaint   Patient presents with   • Carotid Artery Disease   • Lung Nodule       HPI:      PCP:  Preston Dalton MD  Cardiology:  Dr Thompson  GI:  Dr Moya  Hematology:  Dr Bustamante    66yr man with PVD, Carotid Stenosis, CAD, HTN, dyslipidemia, GERD. Mild shortness of breath with exertion.  No hemoptysis, cough, wt loss. Claudication None, No rest pain, No ischemic tissue loss  No amaurosis fugax, No TIA, No stroke  No other associated signs, symptoms or modifying factors.     12/2011:  bilateral renal artery stents  4/2012 JUAN:  RIGHT 1.1 triphasic, LEFT 1.1 triphasic  6/2012 Exercise JUAN:  RIGHT .89 to .69, LEFT 1.0 to .87  4/2014 US RIGHT groin:  3h4h1zd fluid collection deep to femoral vessels.  5/2014 US drainage seroma:  10ml serous fluid, no residual seroma.  5/2014 US RIGHT groin:  5x2x0.5cm fluid collection deep to femoral vessels.  7/2014 Renal Artery Duplex:  RIGHT maxPSV 222cm/s,RI .87, RAR 2.8.  LEFT maxPSV 271cm/s, RI .88, RAR 3.4.  patent bilateral renal artery stents.     9/2012 Carotid Duplex:  ARIC 70-75% antegrade.  LICA <50% antegrade (maxPSV 96cm/s).  9/2012 CTA Carotids:  ARIC 60% small ulcerative plaque.  LICA mild irregularity.  10/2013 CTA Carotids:  ARIC 85%, LICA mild irregularity  12/2013 RIGHT CEA   1/2014 Carotid Duplex:  ARIC 0-15%  7/2014 Carotid Duplex:  ARIC 0-15% antegrade.  LICA 50-79% antegrade (maxPSV 145cm/s).  1/2015 Carotid Duplex:  ARIC 0-15% antegrade.  LICA 50-79% antegrade (maxPSV 147cm/s).  2/2016  Carotid Duplex:  ARIC 0-15% antegrade.  LICA 50-79% antegrade ([id number] cm/s, ratio 2.5) .   2/2016 CTA Carotids:  ARIC no significant disease, LICA 75-80%.  3/2017  LEFT CEA          5/2017  Carotid Duplex:  LICA 0-45% antegrade        11/2017  Carotid Duplex:  ARIC 0-49% maxPSV 91 ratio 1.3 antegrade.  LICA 0-49% % antegrade [id number] cm/s, ratio 1.4         5/2018  Carotid Duplex:  ARIC  0-49% maxPSV 81 ratio 1.0  LICA 0-49% % antegrade [id number] cm/s, ratio 1.6  Vertebrals antegrade        2018 Carotid Duplex:  ARIC 0-49% (74/26cm/s, ratio 1.4)  LICA 0-49%  antegrade 104/34cm/s, ratio 0.9)  Vertebrals antegrade  2020 Carotid Duplex:  ARIC 0-49% (75/25cm/s, ratio 0.7)  LICA 0-49% (112/33cm/s, ratio 1.4)  Vertebrals antegrade     2011 CXR:  LEFT lower lobe atelectasis.  2016 CXR:  RIGHT paratracheal mass, medial segment atelectasis.  2016 PET CT:  RIGHT upper lobe post obstructive atelectasis.  mass obstructing upper lobe bronchus SUV 10.  no significant adenopathy.  liver, adrenals ok.  [id number] PFT:  FVC 4.2 (95%), FEV1 1.7 (47%), DLCO 48%  2016 AB.42/41/57/26/90%  2016 Quantitative lung perfusion scan:  RIGHT 31%, LEFT 69% (predicted post pneumonectomy FEV1 1100ml, adequate for resection)  2016  Right thoracotomy Pneumonectomy, PATH:  SQUAMOUS CARCINOMA, MODERATELY DIFFERENTIATED OF UPPER LOBE BRONCHUS (4.5 CM).THE NEOPLASM IS 0.5 CM AWAY FROM THE BRONCHIAL MARGIN SUBPLEURAL ABSCESS DISTAL TO THE TUMOR. NO EVIDENCE OF ANGIOLYMPHATIC INVOLVEMENT.  All lymph nodes negative.  2016  Chest xray:  expected postoperative changes following right pneumonectomy. Left lung appears clear. There is a displaced right lateral sixth rib fracture it appears to be acute.   2016  Chest xray:  Old healed left rib fractures noted. Displaced right sixth rib fracture unchanged in appearance. CONCLUSION- No significant change in near complete opacification of the right hemithorax. Left lung remains clear.   2016  Chest xray:  consistent with postpneumonectomy changes. There is again noted a displaced right lateral sixth rib fracture. The patient is status post median sternotomy. Emphysema is noted in the left lung. The left lung is otherwise clear. CONCLUSION- No acute disease.   2017 CT Chest:  No new nodules.  Possible RIGHT paratracheal lymph node vs postop change.  Liver adrenal  ok.  5/2018 CT Chest:  No new nodules,5mm GGO LEFT lower lobe.  .  no change RIGHT paratracheal lymph node.  Liver adrenals ok.  12/2018 CT Chest:  No new nodules, 5mm GGO LEFT lower lobe.  no change RIGHT paratracheal lymph node.  Liver adrenals ok.  6/2019 CT Chest:  No new nodules, 5mm GGO LEFT lower lobe.  no change RIGHT paratracheal lymph node.  Liver adrenals ok.  7/2020 CT Chest:  No new nodules, 8mm GGO LEFT central upper lobe.  no change 20mm RIGHT paratracheal lymph node.  Liver adrenals ok.  8/2021 CT Chest:  No new nodules, 8mm GGO LEFT central upper lobe.  no change 20mm RIGHT paratracheal lymph node.  Liver adrenals ok.      The following portions of the patient's history were reviewed and updated as appropriate: allergies, current medications, past family history, past medical history, past social history, past surgical history and problem list.  Recent images independently reviewed.  Available laboratory values reviewed.    PMH:  Past Medical History:   Diagnosis Date   • Carotid artery stenosis    • COPD (chronic obstructive pulmonary disease) (CMS/HCC)    • Coronary atherosclerosis    • Hypercholesterolemia    • Hypertension    • Malignant neoplasm of right lung (CMS/HCC)    • JUAN on CPAP    • PVD (peripheral vascular disease) (CMS/HCC)    • Renal artery stenosis (CMS/HCC)    • Sleep apnea    • Squamous cell carcinoma of lung (CMS/HCC)      Past Surgical History:   Procedure Laterality Date   • CARDIAC CATHETERIZATION     • CAROTID ENDARTERECTOMY     • CORONARY ARTERY BYPASS GRAFT     • ENDOSCOPY N/A 8/29/2019   • ESOPHAGOSCOPY / EGD     • HIP ARTHROPLASTY     • LUNG REMOVAL, TOTAL     • MD THROMBOENDARTECTMY NECK,NECK INCIS Left 3/14/2017   • THORACOTOMY     • TRANSESOPHAGEAL ECHOCARDIOGRAM (LEIDA)       Family History   Problem Relation Age of Onset   • Heart disease Mother    • Hypertension Father    • Cancer Sister      Social History     Tobacco Use   • Smoking status: Former Smoker     Years:  "35.00   • Smokeless tobacco: Never Used   Substance Use Topics   • Alcohol use: No   • Drug use: No       ALLERGIES:  Allergies   Allergen Reactions   • Penicillins Itching     Rash,itching         MEDICATIONS:    Current Outpatient Medications:   •  albuterol (PROVENTIL HFA;VENTOLIN HFA) 108 (90 BASE) MCG/ACT inhaler, Inhale 2 puffs every 4 (four) hours as needed for wheezing., Disp: , Rfl:   •  albuterol (PROVENTIL) (2.5 MG/3ML) 0.083% nebulizer solution, Take 2.5 mg by nebulization Every 6 (Six) Hours As Needed for Wheezing., Disp: 90 vial, Rfl: 5  •  amLODIPine (NORVASC) 10 MG tablet, Take 1 tablet by mouth Daily., Disp: 90 tablet, Rfl: 3  •  aspirin 81 MG EC tablet, Take 81 mg by mouth Daily. Instructed to cont taking, Disp: , Rfl:   •  budesonide-formoterol (SYMBICORT) 80-4.5 MCG/ACT inhaler, Inhale 2 puffs., Disp: , Rfl:   •  Cholecalciferol (VITAMIN D3) 5000 units capsule capsule, Take 5,000 Units by mouth Every Other Day. Takes Monday Wednesday Friday, Disp: , Rfl:   •  clopidogrel (PLAVIX) 75 MG tablet, TAKE 1 TABLET EVERY DAY, Disp: 90 tablet, Rfl: 5  •  ferrous sulfate 325 (65 FE) MG tablet, Take 325 mg by mouth., Disp: , Rfl:   •  ipratropium-albuterol (DUO-NEB) 0.5-2.5 mg/3 ml nebulizer, Take 3 mL by nebulization 4 (Four) Times a Day As Needed for Wheezing., Disp: 360 mL, Rfl: 1  •  labetalol (NORMODYNE) 200 MG tablet, TAKE 1 TABLET TWICE DAILY, Disp: 180 tablet, Rfl: 3  •  pantoprazole (PROTONIX) 40 MG EC tablet, , Disp: , Rfl:   •  tamsulosin (FLOMAX) 0.4 MG capsule 24 hr capsule, , Disp: , Rfl:   •  furosemide (LASIX) 40 MG tablet, TAKE ONE-HALF TABLET EVERY DAY, Disp: 30 tablet, Rfl: 3    Review of Systems   ROS    Physical Exam   Vitals:    09/07/21 1411   BP: 132/68   BP Location: Left arm   Pulse: 60   Temp: 98.4 °F (36.9 °C)   TempSrc: Infrared   SpO2: 94%   Weight: 69.2 kg (152 lb 9.6 oz)   Height: 177.8 cm (70\")     Body surface area is 1.86 meters squared.  Body mass index is 21.9 " kg/m².  Physical Exam    BUN   Date Value Ref Range Status   12/06/2019 23 8 - 23 mg/dL Final   11/09/2018 11 7.0 - 18.0 mg/dL Final     Creatinine   Date Value Ref Range Status   12/06/2019 0.69 (L) 0.76 - 1.27 mg/dL Final   11/09/2018 1.2 0.6 - 1.3 mg/dL Final     eGFR Non  Amer   Date Value Ref Range Status   12/06/2019 115 >60 mL/min/1.73 Final     eGFR African Am   Date Value Ref Range Status   11/09/2018 74  Final     Comment:         GFR    WITH KIDNEY DAMAGE     W/O DAMAGE  >=90         STAGE 1            NORMAL  60-89        STAGE 2            DEC GFR  30-59        STAGE 3            STAGE 3  15-29        STAGE 4            STAGE 4  <15          STAGE 5            STAGE 5      The MDRD GFR formula is valid only for adults b/w age19 and 70.       ASSESSMENT:  Diagnoses and all orders for this visit:    1. Squamous cell carcinoma of right lung (HCC) (Primary)  -     CT Chest Without Contrast Diagnostic; Future    2. Malignant neoplasm of overlapping sites of right lung (HCC)    3. JUAN on CPAP    4. Coronary artery disease involving native coronary artery of native heart without angina pectoris    5. PVD (peripheral vascular disease) (HCC)    6. Essential hypertension    7. Mixed hyperlipidemia    8. Chronic bronchitis, unspecified chronic bronchitis type (HCC)    9. Renal artery stenosis (HCC)    10. Atherosclerosis of native arteries of the extremities with ulceration (HCC)    11. Bilateral carotid artery stenosis  -     Duplex Carotid Ultrasound CAR; Future    12. Hx of CABG    PLAN:  Detailed discussion with Juanito Pruitt regarding situation and options.  Stable lung cancer, carotid stenosis.  Multiple risk factors with severe comorbidities.  No intervention indicated at this time.  Will follow with interval imaging.  Risks, benefits discussed.  Understands and wishes to proceed with plan.    Return in 1 year with CT Chest, Carotid Duplex    Return after above studies complete  Recommended  regular physical activity, progressive walking program.  Continue current medications as directed.  Advance Care Planning   ACP discussion was declined by the patient. Patient does not have an advance directive, declines further assistance.    Thank you for the opportunity to participate in this patient's care.    Copy to primary care provider.

## 2021-12-14 NOTE — PROGRESS NOTES
Sleep Clinic Follow Up    Date: 2021  Primary Care Provider: Preston Dalton MD    Last office visit: 2020 (telepone visit) (I reviewed this note)    CC: Follow up: JUAN on CPAP      Interim History:  Since the last visit:    1) JUAN -  Juanito Pruitt has remained compliant with CPAP. He denies mask and machine issues, dry mouth, headaches, or pressures intolerance. He denies abnormal dreams, sleep paralysis, nasal congestion, URI sx.    2) Patient denies RLS symptoms.     Sleep Testin. Unavailable  2. Currently on 12-20 cm H2O    PAP Data:    Time frame: 2020-2021   Compliance: 99.4%  Average use on days used: 9 hrs 27 min  Percent of days with usage greater than or equal to 4 hours: 99.4%  PAP range: 12-20 cm H2O  Average 90% pressure: 16.5 cmH2O  Leak: 1 hour 35 minutes  Average AHI: 1.4 events/hr  Mask type: Full face mask  DME: [facility]    Bed time: 7748-4935  Sleep latency: 5-10 minutes  Number of times awakens during the night: 3-5  Wake time: 0500  Estimated total sleep time at night: 8-10 hours  Caffeine intake: 1-2 cups of coffee, 1-2 cups of tea, and 0-1 sodas per day  Alcohol intake: 0 drinks per week  Nap time: none   Sleepiness with Driving: none     [place] - 0    PMHx, FH, SH reviewed and pertinent changes are: Started on prednisone.      REVIEW OF SYSTEMS:   Negative for chest pain, SOA, fever, chills, cough, N/V/D, abdominal pain.    Smoking:none    Juanito Pruitt  reports that he has quit smoking. He quit after 35.00 years of use. He has never used smokeless tobacco.    Exam:  Vitals:    21 1510   BP: 110/68   Pulse: 68   SpO2: 92%           21  1510   Weight: 65.3 kg (144 lb)     Body mass index is 20.66 kg/m². Patient's Body mass index is 20.66 kg/m². indicating that he is within normal range (BMI 18.5-24.9). No BMI management plan needed..      Gen:                No distress, conversant, pleasant, appears stated age, alert,  oriented  Eyes:               Anicteric sclera, moist conjunctiva, no lid lag                           PERRL, EOMI   Heent:             NC/AT                          Oropharynx clear                          Normal hearing  Lungs:             Normal effort, non-labored breathing                          Mild wheezing in RUL, absent breath sounds throughout left lung field        CV:                  Normal S1/S2, no murmur                          No lower extremity edema  ABD:               Soft, rounded, non-distended                          Normal bowel sounds                    Psych:             Appropriate affect  Neuro:             CN 2-12 appear intact    Past Medical History:   Diagnosis Date   • Carotid artery stenosis    • COPD (chronic obstructive pulmonary disease) ([facility])    • Coronary atherosclerosis    • Hypercholesterolemia    • Hypertension    • Malignant neoplasm of right lung (HCC)    • JUAN on CPAP    • PVD (peripheral vascular disease) ([facility])    • Renal artery stenosis ([facility])    • Sleep apnea    • Squamous cell carcinoma of lung ([facility])        Current Outpatient Medications:   •  albuterol (PROVENTIL HFA;VENTOLIN HFA) 108 (90 BASE) MCG/ACT inhaler, Inhale 2 puffs every 4 (four) hours as needed for wheezing., Disp: , Rfl:   •  albuterol (PROVENTIL) (2.5 MG/3ML) 0.083% nebulizer solution, Take 2.5 mg by nebulization Every 6 (Six) Hours As Needed for Wheezing., Disp: 90 vial, Rfl: 5  •  amLODIPine (NORVASC) 10 MG tablet, Take 1 tablet by mouth Daily., Disp: 90 tablet, Rfl: 3  •  aspirin 81 MG EC tablet, Take 81 mg by mouth Daily. Instructed to cont taking, Disp: , Rfl:   •  budesonide-formoterol (SYMBICORT) 80-4.5 MCG/ACT inhaler, Inhale 2 puffs., Disp: , Rfl:   •  Cholecalciferol (VITAMIN D3) 5000 units capsule capsule, Take 5,000 Units by mouth Every Other Day. Takes Monday Wednesday Friday, Disp: , Rfl:   •  clopidogrel (PLAVIX) 75 MG tablet, TAKE 1 TABLET EVERY DAY, Disp: 90 tablet, Rfl: 5  •   cyclobenzaprine (FLEXERIL) 5 MG tablet, Take 5-10 mg by mouth At Night As Needed., Disp: , Rfl:   •  dutasteride (AVODART) 0.5 MG capsule, Take 0.5 mg by mouth Daily., Disp: , Rfl:   •  ferrous sulfate 325 (65 FE) MG tablet, Take 325 mg by mouth., Disp: , Rfl:   •  furosemide (LASIX) 40 MG tablet, TAKE ONE-HALF TABLET EVERY DAY, Disp: 30 tablet, Rfl: 3  •  ibuprofen (ADVIL,MOTRIN) 800 MG tablet, TAKE 1 TABLET THREE TIMES DAILY WITH FOOD OR MILK AS NEEDED FOR 10 DAYS, Disp: , Rfl:   •  ipratropium-albuterol (DUO-NEB) 0.5-2.5 mg/3 ml nebulizer, Take 3 mL by nebulization 4 (Four) Times a Day As Needed for Wheezing., Disp: 360 mL, Rfl: 1  •  labetalol (NORMODYNE) 200 MG tablet, TAKE 1 TABLET TWICE DAILY, Disp: 180 tablet, Rfl: 3  •  meloxicam (MOBIC) 7.5 MG tablet, TAKE 1 TABLET TWICE DAILY FOR 14 DAYS, Disp: , Rfl:   •  pantoprazole (PROTONIX) 40 MG EC tablet, , Disp: , Rfl:   •  tamsulosin (FLOMAX) 0.4 MG capsule 24 hr capsule, , Disp: , Rfl:   •  Yupelri 175 MCG/3ML nebulizer solution, , Disp: , Rfl:     WBC   Date Value Ref Range Status   12/06/2019 15.62 (H) 3.40 - 10.80 10*3/mm3 Final   08/12/2019 14.0 (H) 4.8 - 10.8 10*9/L Final     RBC   Date Value Ref Range Status   12/06/2019 4.97 4.14 - 5.80 10*6/mm3 Final   08/12/2019 4.27 (L) 4.7 - 6.1 10*12/L Final     Hemoglobin   Date Value Ref Range Status   12/06/2019 13.9 13.0 - 17.7 g/dL Final   08/12/2019 12.8 (L) 14.0 - 18.0 g/dL Final     Hematocrit   Date Value Ref Range Status   12/06/2019 42.6 37.5 - 51.0 % Final   08/12/2019 38.3 (L) 42 - 54 % Final     MCV   Date Value Ref Range Status   12/06/2019 85.7 79.0 - 97.0 fL Final   08/12/2019 89.7 80 - 100 fL Final     MCH   Date Value Ref Range Status   12/06/2019 28.0 26.6 - 33.0 pg Final   08/12/2019 29.9 26 - 34 pg Final     MCHC   Date Value Ref Range Status   12/06/2019 32.6 31.5 - 35.7 g/dL Final   08/12/2019 33.4 31 - 36 g/dL Final     RDW   Date Value Ref Range Status   12/06/2019 13.9 12.3 - 15.4 % Final    08/12/2019 15.0 (H) 11.5 - 14.5 % Final     RDW-SD   Date Value Ref Range Status   12/06/2019 43.3 37.0 - 54.0 fl Final     MPV   Date Value Ref Range Status   12/06/2019 12.3 (H) 6.0 - 12.0 fL Final   08/12/2019 10.5 (H) 7.4 - 10.4 fL Final     Platelets   Date Value Ref Range Status   12/06/2019 214 140 - 450 10*3/mm3 Final   08/12/2019 269 150 - 450 10*9/L Final     Neutrophil Rel %   Date Value Ref Range Status   08/12/2019 77.3 35 - 80 % Final     Neutrophil %   Date Value Ref Range Status   12/06/2019 88.4 (H) 42.7 - 76.0 % Final     Lymphocyte Rel %   Date Value Ref Range Status   08/12/2019 13.2 (L) 18 - 44 % Final     Lymphocyte %   Date Value Ref Range Status   12/06/2019 4.4 (L) 19.6 - 45.3 % Final     Monocyte Rel %   Date Value Ref Range Status   08/12/2019 5.0 0 - 10 % Final     Monocyte %   Date Value Ref Range Status   12/06/2019 6.2 5.0 - 12.0 % Final     Eosinophil %   Date Value Ref Range Status   12/06/2019 0.0 (L) 0.3 - 6.2 % Final   08/12/2019 3.3 (H) 0 - 3 % Final     Basophil Rel %   Date Value Ref Range Status   08/12/2019 1.2 (H) 0 - 1 % Final     Basophil %   Date Value Ref Range Status   12/06/2019 0.1 0.0 - 1.5 % Final     Immature Grans %   Date Value Ref Range Status   12/06/2019 0.9 (H) 0.0 - 0.5 % Final     Neutrophils, Absolute   Date Value Ref Range Status   12/06/2019 13.80 (H) 1.70 - 7.00 10*3/mm3 Final     Lymphocytes Absolute   Date Value Ref Range Status   08/12/2019 1.8 0.8 - 4.8 10*9/L Final     Lymphocytes, Absolute   Date Value Ref Range Status   12/06/2019 0.69 (L) 0.70 - 3.10 10*3/mm3 Final     Monocytes Absolute   Date Value Ref Range Status   08/12/2019 0.7 0.2 - 0.9 10*9/L Final     Monocytes, Absolute   Date Value Ref Range Status   12/06/2019 0.97 (H) 0.10 - 0.90 10*3/mm3 Final     Eosinophil Abs   Date Value Ref Range Status   08/12/2019 0.5 0.0 - 0.8 10*9/L Final     Eosinophils, Absolute   Date Value Ref Range Status   12/06/2019 0.00 0.00 - 0.40 10*3/mm3 Final      Basophils Absolute   Date Value Ref Range Status   08/12/2019 0.2 (H) 0.0 - 0.1 10*9/L Final     Basophils, Absolute   Date Value Ref Range Status   12/06/2019 0.02 0.00 - 0.20 10*3/mm3 Final     Immature Grans, Absolute   Date Value Ref Range Status   12/06/2019 0.14 (H) 0.00 - 0.05 10*3/mm3 Final     nRBC   Date Value Ref Range Status   12/06/2019 0.0 0.0 - 0.2 /100 WBC Final       Lab Results   Component Value Date    GLUCOSE 101 (H) 12/06/2019    BUN 23 12/06/2019    CREATININE 0.69 (L) 12/06/2019    EGFRIFNONA 115 12/06/2019    EGFRIFAFRI 74 11/09/2018    BCR 33.3 (H) 12/06/2019    K 3.9 12/06/2019    CO2 34.0 (H) 12/06/2019    CALCIUM 9.3 12/06/2019    ALBUMIN 4.20 12/02/2019    AST 23 12/02/2019    ALT 13 12/02/2019       Assessment and Plan:    1. Obstructive sleep apnea - Established, stable (1)  1. Compliant with PAP therapy  2. Advised patient of new safety recall of [employer] CPAP/BiPAP/AVAPS machines. We discussed the risk versus benefit of continuing usage of PAP therapy. The company has recommended that patients stop usage of their current machines. Instructed patient to call [employer] ([phone]) to check if the machine is under warranty for repair or replacement. [place] machine with serial number on website: www.[employer]/src-updates. Follow up with [employer] company regarding any further questions, or for consideration for new machine once eligible. Advised patient to avoid unapproved ozone-type CPAP . Advised to call us if any further questions or problems.  3. Script for PAP supplies  4. Return to clinic in 1 year with compliance report unless change in symptoms in interim period  2. COPD, history of lung cancer S/P pneumonectomy - Established, stable (1)   1. Continue following with Pulmonary  2. Continue nocturnal O2 @ 2LPM bled into CPAP       I spent 20 minutes caring for Juanito on this date of service. This time includes time spent by me in the following  activities: preparing for the visit, reviewing tests, obtaining and/or reviewing a separately obtained history, performing a medically appropriate examination and/or evaluation , counseling and educating the patient/family/caregiver and documenting information in the medical record; discussing PAP therapy, PAP compliance and PAP maintenance    RTC in 12 months. Patient agrees to return sooner if changes in symptoms.        This document has been electronically signed by ROSALIO Honeycutt on December 14, 2021 15:13 CST          CC: Preston Dalton MD          No ref. provider found

## 2021-12-15 NOTE — PROGRESS NOTES
Pulmonary Consultation    No ref. provider found,    Thank you for asking me to see Juanito Pruitt for   Chief Complaint   Patient presents with   • Bronchitis   .      History of Present Illness  Juanito Pruitt is a 66 y.o. male    Patient is s/p right pneumonectomy for lung cancer about 6 years ago    He's on albuterol hfa, nebulizer, on Symbicort, Yupelri neb daily. Feeling more short of breath lately. Did a few weeks of low dose prednisone and that really helped and he would like to discuss staying on that.    Patient uses CPAP at night with oxygen bled in. He was told he needed a new rx for his oxygen by [facility], and for the nebulizer machine as well. 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  Review of Systems   Respiratory: Positive for shortness of breath.      As described in the HPI. Otherwise, remainder of ROS (14 systems) were negative.    Patient Active Problem List   Diagnosis   • JUAN on CPAP   • Malignant neoplasm of right lung (HCC)   • Closed fracture of one rib of right side with nonunion   • Coronary artery disease involving native coronary artery of native heart without angina pectoris   • PVD (peripheral vascular disease) ([facility])   • Essential hypertension   • Mixed hyperlipidemia   • COPD (chronic obstructive pulmonary disease) ([facility])   • Anemia   • Atherosclerosis of coronary artery bypass graft   • Low back pain   • Secondary hypertension   • Squamous cell carcinoma of lung (HCC)   • Dyspnea on exertion   • Sleep apnea   • Renal artery stenosis (HCC)   • Inguinal pain   • Hypercholesterolemia   • Coronary atherosclerosis   • Coagulopathy (HCC)   • Carotid artery stenosis   • Benign hypertension   • 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 Instructed to cont taking, Disp: , Rfl:   •  budesonide-formoterol (SYMBICORT) 80-4.5 MCG/ACT inhaler, Inhale 2 puffs., Disp: , Rfl:   •  Cholecalciferol (VITAMIN D3) 5000 units capsule capsule, Take 5,000 Units by mouth Every Other Day. Takes Monday Wednesday Friday, Disp: , Rfl:   •  clopidogrel (PLAVIX) 75 MG tablet, TAKE 1 TABLET EVERY DAY, Disp: 90 tablet, Rfl: 5  •  ferrous sulfate 325 (65 FE) MG tablet, Take 325 mg by mouth., Disp: , Rfl:   •  furosemide (LASIX) 40 MG tablet, TAKE ONE-HALF TABLET EVERY DAY, Disp: 30 tablet, Rfl: 3  •  ibuprofen (ADVIL,MOTRIN) 800 MG tablet, TAKE 1 TABLET THREE TIMES DAILY WITH FOOD OR MILK AS NEEDED FOR 10 DAYS, Disp: , Rfl:   •  ipratropium-albuterol (DUO-NEB) 0.5-2.5 mg/3 ml nebulizer, Take 3 mL by nebulization 4 (Four) Times a Day As Needed for Wheezing., Disp: 360 mL, Rfl: 1  •  labetalol (NORMODYNE) 200 MG tablet, TAKE 1 TABLET TWICE DAILY, Disp: 180 tablet, Rfl: 3  •  pantoprazole (PROTONIX) 40 MG EC tablet, , Disp: , Rfl:   •  tamsulosin (FLOMAX) 0.4 MG capsule 24 hr capsule, , Disp: , Rfl:   •  albuterol sulfate HFA (Ventolin HFA) 108 (90 Base) MCG/ACT inhaler, Inhale 2 puffs Every 4 (Four) Hours As Needed for Wheezing or Shortness of Air., Disp: 18 g, Rfl: 11  •  cyclobenzaprine (FLEXERIL) 5 MG tablet, Take 5-10 mg by mouth At Night As Needed., Disp: , Rfl:   •  ipratropium-albuterol (DUO-NEB) 0.5-2.5 mg/3 ml nebulizer, Take 3 mL by nebulization Every 4 (Four) Hours As Needed  "for Wheezing or Shortness of Air., Disp: 360 mL, Rfl: 11  •  predniSONE (DELTASONE) 10 MG tablet, Take 1 tablet by mouth Daily., Disp: 30 tablet, Rfl: 2    Allergies   Allergen Reactions   • Penicillins Itching     Rash,itching       Past Medical History:   Diagnosis Date   • Carotid artery stenosis    • COPD (chronic obstructive pulmonary disease) (HCC)    • Coronary atherosclerosis    • Hypercholesterolemia    • Hypertension    • Malignant neoplasm of right lung (HCC)    • JUAN on CPAP    • PVD (peripheral vascular disease) (HCC)    • Renal artery stenosis (HCC)    • Sleep apnea    • Squamous cell carcinoma of lung (HCC)      Past Surgical History:   Procedure Laterality Date   • CARDIAC CATHETERIZATION     • CAROTID ENDARTERECTOMY     • CORONARY ARTERY BYPASS GRAFT     • ENDOSCOPY N/A 8/29/2019   • ESOPHAGOSCOPY / EGD     • HIP ARTHROPLASTY     • LUNG REMOVAL, TOTAL     • [place] THROMBOENDARTECTMY NECK,NECK INCIS Left 3/14/2017   • THORACOTOMY     • TRANSESOPHAGEAL ECHOCARDIOGRAM (LEIDA)       Social History     Socioeconomic History   • Marital status:    Tobacco Use   • Smoking status: Former Smoker     Years: 35.00   • Smokeless tobacco: Never Used   Vaping Use   • Vaping Use: Never used   Substance and Sexual Activity   • Alcohol use: No   • Drug use: No   • Sexual activity: Not Currently     Family History   Problem Relation Age of Onset   • Heart disease Mother    • Hypertension Father    • Cancer Sister           Objective     Blood pressure 116/54, pulse 54, temperature 97.7 °F (36.5 °C), height 177.8 cm (70\"), weight 66.2 kg (146 lb), SpO2 91 %.  Physical Exam  Vitals reviewed.   Constitutional:       Appearance: Normal appearance.   HENT:      Head: Normocephalic and atraumatic.      Right Ear: Tympanic membrane normal.      Left Ear: Tympanic membrane normal.      Nose: Nose normal.      Mouth/Throat:      Mouth: Mucous membranes are moist.      Pharynx: Oropharynx is clear.   Eyes:      " Conjunctiva/sclera: Conjunctivae normal.      Pupils: Pupils are equal, round, and reactive to light.   Cardiovascular:      Rate and Rhythm: Normal rate and regular rhythm.      Pulses: Normal pulses.      Heart sounds: Normal heart sounds.   Pulmonary:      Effort: Pulmonary effort is normal.      Comments: Absent on the right  Abdominal:      General: Abdomen is flat. Bowel sounds are normal.      Palpations: Abdomen is soft.   Musculoskeletal:         General: Normal range of motion.      Cervical back: Normal range of motion and neck supple.   Skin:     General: Skin is warm and dry.   Neurological:      General: No focal deficit present.      Mental Status: He is alert and oriented to person, place, and time.   Psychiatric:         Mood and Affect: Mood normal.         Behavior: Behavior normal.         PFTs: (independently reviewed and interpreted by me)  6/11/21  FVC 3.04L, 72%  FEV1 1.08L, 32%  Ratio35%  +BDR  TLC 5.90L, 90%  %  DLCO 53%    Radiology (independently reviewed and interpreted by me):   CT chest 7/6/21- right pneumonectomy, left lung with emphysematous changes       Assessment/Plan     Diagnoses and all orders for this visit:    1. COPD, severe (HCC) (Primary)    2. History of pneumonectomy    Other orders  -     ipratropium-albuterol (DUO-NEB) 0.5-2.5 mg/3 ml nebulizer; Take 3 mL by nebulization Every 4 (Four) Hours As Needed for Wheezing or Shortness of Air.  Dispense: 360 mL; Refill: 11  -     albuterol sulfate HFA (Ventolin HFA) 108 (90 Base) MCG/ACT inhaler; Inhale 2 puffs Every 4 (Four) Hours As Needed for Wheezing or Shortness of Air.  Dispense: 18 g; Refill: 11  -     predniSONE (DELTASONE) 10 MG tablet; Take 1 tablet by mouth Daily.  Dispense: 30 tablet; Refill: 2         Discussion/ Recommendations:   Patient with severe obstruction on last PFTs. We discussed other options for med regimens for him. He doesn't really feel anything with the Yupelri so will stop that and switch to  Preet since he's already using his albuterol nebs tid. Can continue to use Symbicort. We discussed the risks and benefits of long term steroids. He feels his quality of life is pretty poor right now in regards to his functional status. Will start him on 10mg and try that for a while, then have him go to 5mg. Can return to 10 if he notices worsening but if not, just stay at 5.    We did a desat study today and he dropped to 78% SpO2 on RA while walking. He cannot carry portable tanks. Needs a portable concentrator    -Stop Yupelri  -Start Albuterol/Ipratropium nebs q 6  -Continue Symbicort 2 puffs bid  -Prednisone 10mg   -Portable concentrator             Return in about 3 months (around 3/15/2022).      Thank you for allowing me to participate in the care of Juanito Pruitt. Please do not hesitate to contact me with any questions.         This document has been electronically signed by Beatriz Clarke DO on December 15, 2021 16:39 CST

## 2021-12-16 NOTE — PROGRESS NOTES
Patients office notes dated 12/15/2021, along with his 6 minute walk results, orders for portable oxygen, nebulizer and nebulizer supplies were faxed to [facility] at patients request.

## 2022-01-01 ENCOUNTER — LAB (OUTPATIENT)
Dept: LAB | Facility: HOSPITAL | Age: 67
End: 2022-01-01

## 2022-01-01 ENCOUNTER — OFFICE VISIT (OUTPATIENT)
Dept: CARDIOLOGY | Facility: CLINIC | Age: 67
End: 2022-01-01

## 2022-01-01 VITALS
TEMPERATURE: 96.1 F | OXYGEN SATURATION: 95 % | WEIGHT: 144 LBS | BODY MASS INDEX: 20.62 KG/M2 | HEIGHT: 70 IN | SYSTOLIC BLOOD PRESSURE: 122 MMHG | HEART RATE: 80 BPM | DIASTOLIC BLOOD PRESSURE: 68 MMHG

## 2022-01-01 DIAGNOSIS — D50.0 IRON DEFICIENCY ANEMIA DUE TO CHRONIC BLOOD LOSS: ICD-10-CM

## 2022-01-01 DIAGNOSIS — I10 ESSENTIAL HYPERTENSION: ICD-10-CM

## 2022-01-01 DIAGNOSIS — Z95.1 HX OF CABG: Primary | ICD-10-CM

## 2022-01-01 DIAGNOSIS — R06.09 DYSPNEA ON EXERTION: ICD-10-CM

## 2022-01-01 DIAGNOSIS — I73.9 PVD (PERIPHERAL VASCULAR DISEASE): ICD-10-CM

## 2022-01-01 DIAGNOSIS — E78.00 HYPERCHOLESTEROLEMIA: ICD-10-CM

## 2022-01-01 LAB
ALBUMIN SERPL-MCNC: 4.6 G/DL (ref 3.5–5.2)
ALBUMIN/GLOB SERPL: 2.6 G/DL
ALP SERPL-CCNC: 61 U/L (ref 39–117)
ALT SERPL W P-5'-P-CCNC: 16 U/L (ref 1–41)
ANION GAP SERPL CALCULATED.3IONS-SCNC: 14.2 MMOL/L (ref 5–15)
ANISOCYTOSIS BLD QL: ABNORMAL
AST SERPL-CCNC: 20 U/L (ref 1–40)
BILIRUB SERPL-MCNC: 0.4 MG/DL (ref 0–1.2)
BUN SERPL-MCNC: 23 MG/DL (ref 8–23)
BUN/CREAT SERPL: 25.3 (ref 7–25)
CALCIUM SPEC-SCNC: 9.3 MG/DL (ref 8.6–10.5)
CHLORIDE SERPL-SCNC: 99 MMOL/L (ref 98–107)
CO2 SERPL-SCNC: 24.8 MMOL/L (ref 22–29)
CREAT SERPL-MCNC: 0.91 MG/DL (ref 0.76–1.27)
DEPRECATED RDW RBC AUTO: 39.1 FL (ref 37–54)
ERYTHROCYTE [DISTWIDTH] IN BLOOD BY AUTOMATED COUNT: 16.9 % (ref 12.3–15.4)
GFR SERPL CREATININE-BSD FRML MDRD: 83 ML/MIN/1.73
GLOBULIN UR ELPH-MCNC: 1.8 GM/DL
GLUCOSE SERPL-MCNC: 89 MG/DL (ref 65–99)
HCT VFR BLD AUTO: 29.1 % (ref 37.5–51)
HGB BLD-MCNC: 8 G/DL (ref 13–17.7)
HYPOCHROMIA BLD QL: ABNORMAL
LYMPHOCYTES # BLD MANUAL: 0.44 10*3/MM3 (ref 0.7–3.1)
LYMPHOCYTES NFR BLD MANUAL: 2 % (ref 5–12)
MCH RBC QN AUTO: 18.5 PG (ref 26.6–33)
MCHC RBC AUTO-ENTMCNC: 27.5 G/DL (ref 31.5–35.7)
MCV RBC AUTO: 67.4 FL (ref 79–97)
MICROCYTES BLD QL: ABNORMAL
MONOCYTES # BLD: 0.29 10*3/MM3 (ref 0.1–0.9)
NEUTROPHILS # BLD AUTO: 13.59 10*3/MM3 (ref 1.7–7)
NEUTROPHILS NFR BLD MANUAL: 94.9 % (ref 42.7–76)
NRBC SPEC MANUAL: 2 /100 WBC (ref 0–0.2)
NT-PROBNP SERPL-MCNC: 6202 PG/ML (ref 0–900)
PLAT MORPH BLD: NORMAL
PLATELET # BLD AUTO: 376 10*3/MM3 (ref 140–450)
PMV BLD AUTO: 10.6 FL (ref 6–12)
POIKILOCYTOSIS BLD QL SMEAR: ABNORMAL
POLYCHROMASIA BLD QL SMEAR: ABNORMAL
POTASSIUM SERPL-SCNC: 4.6 MMOL/L (ref 3.5–5.2)
PROT SERPL-MCNC: 6.4 G/DL (ref 6–8.5)
RBC # BLD AUTO: 4.32 10*6/MM3 (ref 4.14–5.8)
SODIUM SERPL-SCNC: 138 MMOL/L (ref 136–145)
TARGETS BLD QL SMEAR: ABNORMAL
VARIANT LYMPHS NFR BLD MANUAL: 3.1 % (ref 19.6–45.3)
WBC MORPH BLD: NORMAL
WBC NRBC COR # BLD: 14.32 10*3/MM3 (ref 3.4–10.8)

## 2022-01-01 PROCEDURE — 80053 COMPREHEN METABOLIC PANEL: CPT | Performed by: INTERNAL MEDICINE

## 2022-01-01 PROCEDURE — 36415 COLL VENOUS BLD VENIPUNCTURE: CPT | Performed by: INTERNAL MEDICINE

## 2022-01-01 PROCEDURE — 85025 COMPLETE CBC W/AUTO DIFF WBC: CPT | Performed by: INTERNAL MEDICINE

## 2022-01-01 PROCEDURE — 99214 OFFICE O/P EST MOD 30 MIN: CPT | Performed by: INTERNAL MEDICINE

## 2022-01-01 PROCEDURE — 83880 ASSAY OF NATRIURETIC PEPTIDE: CPT | Performed by: INTERNAL MEDICINE

## 2022-01-01 PROCEDURE — 85007 BL SMEAR W/DIFF WBC COUNT: CPT | Performed by: INTERNAL MEDICINE

## 2022-01-01 RX ORDER — LOSARTAN POTASSIUM 25 MG/1
25 TABLET ORAL DAILY
Qty: 90 TABLET | Refills: 3 | Status: SHIPPED | OUTPATIENT
Start: 2022-01-01

## 2022-01-01 RX ORDER — ALBUTEROL SULFATE 90 UG/1
2 AEROSOL, METERED RESPIRATORY (INHALATION) EVERY 4 HOURS PRN
Qty: 18 G | Refills: 11 | Status: SHIPPED | OUTPATIENT
Start: 2022-01-01

## 2022-01-01 RX ORDER — SIMETHICONE 80 MG
TABLET,CHEWABLE ORAL EVERY 4 HOURS
COMMUNITY
Start: 2022-01-01

## 2022-01-05 NOTE — PROGRESS NOTES
Juanito Pruitt  66 y.o. male    1. Hx of CABG    2. Essential hypertension    3. Hypercholesterolemia    4. PVD (peripheral vascular disease) (HCC)    5. Dyspnea on exertion    6. Iron deficiency anemia due to chronic blood loss        History of Present Illness:  Mr. Pruitt is being assessed for the above-stated problems. He has documented extensive peripheral vascular disease and has been followed by Dr. Ray on a regular basis. He has history of bilateral renal artery stents in December 2011, right carotid endarterectomy in 2013, Left carotid endarterectomy in 2017.  His bypass surgery was in 2011.  In 2016 he underwent right thoracotomy and pneumonectomy for squamous cell carcinoma right lung.  He has history of COPD.    The patient denied any chest pain but has chronic dyspnea on exertion which he believes has gotten worse during the past few months.  He now uses home oxygen especially when he is ambulating.  In spite of this, he is able to manage on his own.    Echocardiogram in September 2021 showed:  · The study is technically difficult for diagnosis with poor acoustic windows in the apical window. The quality of the study is limited due to lung disease.  · Estimated left ventricular EF = 48% Left ventricular ejection fraction appears to be 46 - 50%. Left ventricular systolic function is low normal.  · Left ventricular diastolic function is consistent with (grade I) impaired relaxation.  · The right ventricular cavity is mildly dilated.  · Estimated right ventricular systolic pressure from tricuspid regurgitation is normal (<35 mmHg).    Carotid Doppler studies in September 2021 showed:  · Right internal carotid artery stenosis of 0-49%.  · Left internal carotid artery stenosis of 0-49%.     His heart rate and blood pressure were in the normal range and no signs of fluid overload was noted.       PMH:  Medical History        Past Medical History:   Diagnosis Date   • Allergic rhinitis     •  Atherosclerosis of native arteries of the extremities with ulceration (CMS/HCC)       Bilateral legs   • Benign hypertension     • Carotid artery stenosis     • Coagulopathy (CMS/HCC)       on plavix and asa (instructed to continue per md)   • Coronary atherosclerosis     • Essential hypertension     • Hypercholesterolemia     • Inguinal pain       small right groin seroma   • Malignant neoplasm of lung (CMS/HCC)       Right upper lobe mass suspicious for lung cancer   • JUAN on CPAP     • PVD (peripheral vascular disease) (CMS/HCC)     • Renal artery stenosis (CMS/HCC)     • Simple chronic bronchitis (CMS/HCC)     • Sleep apnea     • SOB (shortness of breath)     • Squamous cell carcinoma of lung (CMS/HCC)           Surgical History           SUBJECTIVE    Allergies   Allergen Reactions   • Penicillins Itching     Rash,itching         Past Medical History:   Diagnosis Date   • Carotid artery stenosis    • COPD (chronic obstructive pulmonary disease) (HCC)    • Coronary atherosclerosis    • Hypercholesterolemia    • Hypertension    • Malignant neoplasm of right lung (HCC)    • JUAN on CPAP    • PVD (peripheral vascular disease) (HCC)    • Renal artery stenosis (HCC)    • Sleep apnea    • Squamous cell carcinoma of lung (HCC)          Past Surgical History:   Procedure Laterality Date   • CARDIAC CATHETERIZATION     • CAROTID ENDARTERECTOMY     • CORONARY ARTERY BYPASS GRAFT     • ENDOSCOPY N/A 8/29/2019   • ESOPHAGOSCOPY / EGD     • HIP ARTHROPLASTY     • LUNG REMOVAL, TOTAL     • [place] THROMBOENDARTECTMY NECK,NECK INCIS Left 3/14/2017   • THORACOTOMY     • TRANSESOPHAGEAL ECHOCARDIOGRAM (LEIDA)           Family History   Problem Relation Age of Onset   • Heart disease Mother    • Hypertension Father    • Cancer Sister          Social History     Socioeconomic History   • Marital status:    Tobacco Use   • Smoking status: Former Smoker     Years: 35.00   • Smokeless tobacco: Never Used   Vaping Use   • Vaping Use:  Never used   Substance and Sexual Activity   • Alcohol use: No   • Drug use: No   • Sexual activity: Not Currently         Current Outpatient Medications   Medication Sig Dispense Refill   • albuterol (PROVENTIL HFA;VENTOLIN HFA) 108 (90 BASE) MCG/ACT inhaler Inhale 2 puffs every 4 (four) hours as needed for wheezing.     • albuterol sulfate HFA (Ventolin HFA) 108 (90 Base) MCG/ACT inhaler Inhale 2 puffs Every 4 (Four) Hours As Needed for Wheezing or Shortness of Air. 18 g 11   • amLODIPine (NORVASC) 10 MG tablet Take 1 tablet by mouth Daily. 90 tablet 3   • aspirin 81 MG EC tablet Take 81 mg by mouth Daily. Instructed to cont taking     • budesonide-formoterol (SYMBICORT) 80-4.5 MCG/ACT inhaler Inhale 2 puffs.     • Cholecalciferol (VITAMIN D3) 5000 units capsule capsule Take 5,000 Units by mouth Every Other Day. Takes Monday Wednesday Friday     • clopidogrel (PLAVIX) 75 MG tablet TAKE 1 TABLET EVERY DAY 90 tablet 5   • ferrous sulfate 325 (65 FE) MG tablet Take 325 mg by mouth.     • furosemide (LASIX) 40 MG tablet TAKE ONE-HALF TABLET EVERY DAY 30 tablet 3   • ibuprofen (ADVIL,MOTRIN) 800 MG tablet TAKE 1 TABLET THREE TIMES DAILY WITH FOOD OR MILK AS NEEDED FOR 10 DAYS     • ipratropium-albuterol (DUO-NEB) 0.5-2.5 mg/3 ml nebulizer Take 3 mL by nebulization Every 4 (Four) Hours As Needed for Wheezing or Shortness of Air. 360 mL 11   • labetalol (NORMODYNE) 200 MG tablet TAKE 1 TABLET TWICE DAILY 180 tablet 3   • pantoprazole (PROTONIX) 40 MG EC tablet      • predniSONE (DELTASONE) 10 MG tablet Take 1 tablet by mouth Daily. 30 tablet 2   • simethicone (MYLICON) 80 MG chewable tablet Chew Every 4 (Four) Hours.     • tamsulosin (FLOMAX) 0.4 MG capsule 24 hr capsule      • cyclobenzaprine (FLEXERIL) 5 MG tablet Take 5-10 mg by mouth At Night As Needed.       No current facility-administered medications for this visit.         OBJECTIVE    /68 (BP Location: Left arm, Patient Position: Sitting, Cuff Size: Adult)  "  Pulse 80   Temp 96.1 °F (35.6 °C)   Ht 177.8 cm (70\")   Wt 65.3 kg (144 lb)   SpO2 95%   BMI 20.66 kg/m²       Review of Systems : The following systems are reviewed and changes noted as indicated below.    Constitutional:  Denies recent weight loss, weight gain, fever or chills     HENT:  Denies any hearing loss, epistaxis, hoarseness, or difficulty speaking.     Eyes: Wears eyeglasses or contact lenses     Respiratory:  Dyspnea with exertion,no cough, wheezing, or hemoptysis.     Cardiovascular: Negative for palpations, chest pain, orthopnea, PND    Gastrointestinal:  Denies change in bowel habits, dyspepsia, ulcer disease, hematochezia, or melena.     Endocrine: Negative for cold intolerance, heat intolerance, polydipsia, polyphagia and polyuria.    Genitourinary: Negative.      Musculoskeletal: DJD    Skin: Increased bruising    Neurological:  Denies any history of recurrent headaches, strokes, TIA, or seizure disorder.     Hematological: History of anemia    Psychiatric/Behavioral: Denies any history of depression, substance abuse, or change in cognitive function.        Physical Exam : The following systems are reviewed and no changes noted     Constitutional: Cooperative, alert and oriented,  in no acute distress.      HENT:   Head: Normocephalic, normal hair patterns, no masses or tenderness.  Ears, Nose, and Throat: No gross abnormalities. No pallor or cyanosis.  Eyes: EOMS intact, PERRL, conjunctivae and lids unremarkable. Fundoscopic exam and visual fields not performed.   Neck: No palpable masses or adenopathy, no thyromegaly, no JVD, carotid pulses are full and equal bilaterally and without  Bruits.      Cardiovascular: Regular rhythm, S1 and S2 normal, no S3 or S4. No murmurs, gallops, or rubs detected.      Pulmonary/Chest: Chest: normal symmetry,  normal respiratory excursion, no intercostal retraction, no use of accessory muscles.                                  Pulmonary: History of right " pneumonectomy.  No rales or rhonchi     Abdominal: Abdomen soft, bowel sounds normoactive, no masses, no hepatosplenomegaly, non-tender, no bruits.     Musculoskeletal: No deformities, clubbing, cyanosis, erythema, or edema observed.     Neurological: No gross motor or sensory deficits noted, affect appropriate, oriented to time, person, place.      Skin: Warm and dry to the touch, no apparent skin lesions or masses noted.     Psychiatric: He has a normal mood and affect. His behavior is normal. Judgment and thought content normal.        Lab Results   Component Value Date    WBC 15.62 (H) 12/06/2019    HGB 13.9 12/06/2019    HCT 42.6 12/06/2019    MCV 85.7 12/06/2019     12/06/2019     Lab Results   Component Value Date    GLUCOSE 101 (H) 12/06/2019    BUN 23 12/06/2019    CREATININE 0.69 (L) 12/06/2019    EGFRIFNONA 115 12/06/2019    EGFRIFAFRI 74 11/09/2018    BCR 33.3 (H) 12/06/2019    CO2 34.0 (H) 12/06/2019    CALCIUM 9.3 12/06/2019    ALBUMIN 4.20 12/02/2019    AST 23 12/02/2019    ALT 13 12/02/2019     Lab Results   Component Value Date    CHOL 131 05/26/2021    CHOL 129 01/31/2019     Lab Results   Component Value Date    TRIG 72 05/26/2021    TRIG 136 01/31/2019     Lab Results   Component Value Date    HDL 53 05/26/2021    HDL 40 (L) 01/31/2019     No components found for: LDLCALC  Lab Results   Component Value Date    LDL 54 05/26/2021    LDL 70 01/31/2019     No results found for: HDLLDLRATIO  No components found for: CHOLHDL  No results found for: HGBA1C  No results found for: TSH, C5HORKA, P3DOQKX, THYROIDAB        ASSESSMENT AND PLAN  Mr. Pruitt has multiple medical issues as discussed under history of present illness and does report worsening dyspnea.  I suspect this is primarily secondary to his pulmonary status.  No definite evidence of congestive heart failure was noted.  His heart rate and blood pressure were in the normal range.    After reviewing his medications I have continued  antihypertensive therapy with labetalol, amlodipine, antiplatelet therapy with aspirin, Plavix and Lasix has been continued.  I understand that he has seen Dr. Moya for evaluation of anemia.    To make sure that there is no worsening of anemia and to assess his electrolytes and renal function lab tests indicated below have been ordered.  BNP is also being checked to rule out congestive heart failure.    Diagnoses and all orders for this visit:    1. Hx of CABG (Primary)  -     Comprehensive Metabolic Panel  -     CBC & Differential  -     BNP    2. Essential hypertension  -     Comprehensive Metabolic Panel  -     CBC & Differential  -     BNP    3. Hypercholesterolemia  -     Comprehensive Metabolic Panel  -     CBC & Differential  -     BNP    4. PVD (peripheral vascular disease) (HCC)  -     Comprehensive Metabolic Panel  -     CBC & Differential  -     BNP    5. Dyspnea on exertion  -     Comprehensive Metabolic Panel  -     CBC & Differential  -     BNP    6. Iron deficiency anemia due to chronic blood loss  -     Comprehensive Metabolic Panel  -     CBC & Differential  -     BNP        Patient's Body mass index is 20.66 kg/m². BMI is within normal parameters. No follow-up required..  He is a non-smoker    Eber Thompson MD  1/5/2022  13:50 CST

## 2022-01-10 ENCOUNTER — TELEPHONE (OUTPATIENT)
Dept: CARDIOLOGY | Facility: CLINIC | Age: 67
End: 2022-01-10
